# Patient Record
Sex: FEMALE | Race: BLACK OR AFRICAN AMERICAN | NOT HISPANIC OR LATINO
[De-identification: names, ages, dates, MRNs, and addresses within clinical notes are randomized per-mention and may not be internally consistent; named-entity substitution may affect disease eponyms.]

---

## 2017-01-17 ENCOUNTER — APPOINTMENT (OUTPATIENT)
Dept: VASCULAR SURGERY | Facility: CLINIC | Age: 62
End: 2017-01-17

## 2017-02-21 ENCOUNTER — APPOINTMENT (OUTPATIENT)
Dept: VASCULAR SURGERY | Facility: CLINIC | Age: 62
End: 2017-02-21

## 2017-04-29 ENCOUNTER — OUTPATIENT (OUTPATIENT)
Dept: OUTPATIENT SERVICES | Facility: HOSPITAL | Age: 62
LOS: 1 days | End: 2017-04-29
Payer: COMMERCIAL

## 2017-04-29 ENCOUNTER — APPOINTMENT (OUTPATIENT)
Dept: RADIOLOGY | Facility: HOSPITAL | Age: 62
End: 2017-04-29

## 2017-04-29 PROCEDURE — 77080 DXA BONE DENSITY AXIAL: CPT

## 2017-06-27 ENCOUNTER — APPOINTMENT (OUTPATIENT)
Dept: VASCULAR SURGERY | Facility: CLINIC | Age: 62
End: 2017-06-27

## 2017-06-27 VITALS
HEART RATE: 82 BPM | SYSTOLIC BLOOD PRESSURE: 144 MMHG | BODY MASS INDEX: 45.99 KG/M2 | HEIGHT: 67 IN | WEIGHT: 293 LBS | DIASTOLIC BLOOD PRESSURE: 83 MMHG | TEMPERATURE: 98.1 F

## 2017-06-27 DIAGNOSIS — L85.3 XEROSIS CUTIS: ICD-10-CM

## 2017-08-01 ENCOUNTER — APPOINTMENT (OUTPATIENT)
Dept: VASCULAR SURGERY | Facility: CLINIC | Age: 62
End: 2017-08-01
Payer: COMMERCIAL

## 2017-08-01 VITALS
TEMPERATURE: 98.3 F | HEART RATE: 64 BPM | WEIGHT: 293 LBS | BODY MASS INDEX: 45.99 KG/M2 | SYSTOLIC BLOOD PRESSURE: 153 MMHG | DIASTOLIC BLOOD PRESSURE: 83 MMHG | HEIGHT: 67 IN

## 2017-08-01 PROCEDURE — 99213 OFFICE O/P EST LOW 20 MIN: CPT

## 2017-09-07 ENCOUNTER — OUTPATIENT (OUTPATIENT)
Dept: OUTPATIENT SERVICES | Facility: HOSPITAL | Age: 62
LOS: 1 days | End: 2017-09-07
Payer: COMMERCIAL

## 2017-09-07 ENCOUNTER — APPOINTMENT (OUTPATIENT)
Dept: ULTRASOUND IMAGING | Facility: CLINIC | Age: 62
End: 2017-09-07
Payer: COMMERCIAL

## 2017-09-07 DIAGNOSIS — Z00.8 ENCOUNTER FOR OTHER GENERAL EXAMINATION: ICD-10-CM

## 2017-09-07 PROCEDURE — 76536 US EXAM OF HEAD AND NECK: CPT | Mod: 26

## 2017-09-07 PROCEDURE — 76536 US EXAM OF HEAD AND NECK: CPT

## 2017-09-12 ENCOUNTER — APPOINTMENT (OUTPATIENT)
Dept: VASCULAR SURGERY | Facility: CLINIC | Age: 62
End: 2017-09-12
Payer: COMMERCIAL

## 2017-09-12 VITALS
HEIGHT: 67 IN | HEART RATE: 71 BPM | WEIGHT: 292 LBS | TEMPERATURE: 98.2 F | BODY MASS INDEX: 45.83 KG/M2 | SYSTOLIC BLOOD PRESSURE: 152 MMHG | DIASTOLIC BLOOD PRESSURE: 85 MMHG

## 2017-09-12 PROCEDURE — 99213 OFFICE O/P EST LOW 20 MIN: CPT

## 2017-10-31 ENCOUNTER — APPOINTMENT (OUTPATIENT)
Dept: VASCULAR SURGERY | Facility: CLINIC | Age: 62
End: 2017-10-31
Payer: COMMERCIAL

## 2017-10-31 VITALS
DIASTOLIC BLOOD PRESSURE: 77 MMHG | HEIGHT: 67 IN | HEART RATE: 61 BPM | SYSTOLIC BLOOD PRESSURE: 168 MMHG | TEMPERATURE: 98 F

## 2017-10-31 PROCEDURE — 99213 OFFICE O/P EST LOW 20 MIN: CPT

## 2018-01-17 ENCOUNTER — APPOINTMENT (OUTPATIENT)
Dept: VASCULAR SURGERY | Facility: CLINIC | Age: 63
End: 2018-01-17

## 2018-01-30 ENCOUNTER — RESULT REVIEW (OUTPATIENT)
Age: 63
End: 2018-01-30

## 2018-01-30 ENCOUNTER — APPOINTMENT (OUTPATIENT)
Dept: VASCULAR SURGERY | Facility: CLINIC | Age: 63
End: 2018-01-30
Payer: COMMERCIAL

## 2018-01-30 VITALS
WEIGHT: 293 LBS | DIASTOLIC BLOOD PRESSURE: 77 MMHG | BODY MASS INDEX: 45.99 KG/M2 | SYSTOLIC BLOOD PRESSURE: 152 MMHG | TEMPERATURE: 98.2 F | HEART RATE: 74 BPM | HEIGHT: 67 IN

## 2018-01-30 PROCEDURE — 99213 OFFICE O/P EST LOW 20 MIN: CPT

## 2018-01-30 PROCEDURE — 10022: CPT

## 2018-01-30 PROCEDURE — 76942 ECHO GUIDE FOR BIOPSY: CPT | Mod: 26

## 2018-05-02 ENCOUNTER — APPOINTMENT (OUTPATIENT)
Dept: VASCULAR SURGERY | Facility: CLINIC | Age: 63
End: 2018-05-02

## 2018-06-22 ENCOUNTER — APPOINTMENT (OUTPATIENT)
Dept: MAMMOGRAPHY | Facility: CLINIC | Age: 63
End: 2018-06-22

## 2018-06-22 ENCOUNTER — APPOINTMENT (OUTPATIENT)
Dept: ULTRASOUND IMAGING | Facility: CLINIC | Age: 63
End: 2018-06-22

## 2018-07-20 ENCOUNTER — APPOINTMENT (OUTPATIENT)
Dept: NEUROLOGY | Facility: CLINIC | Age: 63
End: 2018-07-20
Payer: COMMERCIAL

## 2018-07-20 VITALS
HEIGHT: 67 IN | SYSTOLIC BLOOD PRESSURE: 138 MMHG | DIASTOLIC BLOOD PRESSURE: 77 MMHG | HEART RATE: 67 BPM | WEIGHT: 293 LBS | BODY MASS INDEX: 45.99 KG/M2

## 2018-07-20 DIAGNOSIS — Z82.0 FAMILY HISTORY OF EPILEPSY AND OTHER DISEASES OF THE NERVOUS SYSTEM: ICD-10-CM

## 2018-07-20 DIAGNOSIS — Z83.511 FAMILY HISTORY OF GLAUCOMA: ICD-10-CM

## 2018-07-20 DIAGNOSIS — Z83.3 FAMILY HISTORY OF DIABETES MELLITUS: ICD-10-CM

## 2018-07-20 DIAGNOSIS — M25.541 PAIN IN JOINTS OF RIGHT HAND: ICD-10-CM

## 2018-07-20 PROCEDURE — 99204 OFFICE O/P NEW MOD 45 MIN: CPT

## 2018-11-12 ENCOUNTER — APPOINTMENT (OUTPATIENT)
Dept: VASCULAR SURGERY | Facility: CLINIC | Age: 63
End: 2018-11-12
Payer: COMMERCIAL

## 2018-11-12 VITALS
HEART RATE: 74 BPM | TEMPERATURE: 98.4 F | HEIGHT: 67 IN | WEIGHT: 293 LBS | SYSTOLIC BLOOD PRESSURE: 130 MMHG | DIASTOLIC BLOOD PRESSURE: 83 MMHG | BODY MASS INDEX: 45.99 KG/M2

## 2018-11-12 PROCEDURE — 99213 OFFICE O/P EST LOW 20 MIN: CPT

## 2018-12-22 ENCOUNTER — APPOINTMENT (OUTPATIENT)
Dept: ULTRASOUND IMAGING | Facility: IMAGING CENTER | Age: 63
End: 2018-12-22

## 2018-12-22 ENCOUNTER — APPOINTMENT (OUTPATIENT)
Dept: MAMMOGRAPHY | Facility: IMAGING CENTER | Age: 63
End: 2018-12-22

## 2019-02-05 ENCOUNTER — APPOINTMENT (OUTPATIENT)
Dept: VASCULAR SURGERY | Facility: CLINIC | Age: 64
End: 2019-02-05

## 2019-04-17 ENCOUNTER — APPOINTMENT (OUTPATIENT)
Dept: VASCULAR SURGERY | Facility: CLINIC | Age: 64
End: 2019-04-17

## 2019-06-01 ENCOUNTER — APPOINTMENT (OUTPATIENT)
Dept: ULTRASOUND IMAGING | Facility: IMAGING CENTER | Age: 64
End: 2019-06-01

## 2019-06-01 ENCOUNTER — APPOINTMENT (OUTPATIENT)
Dept: MAMMOGRAPHY | Facility: IMAGING CENTER | Age: 64
End: 2019-06-01

## 2019-08-05 ENCOUNTER — APPOINTMENT (OUTPATIENT)
Dept: SURGERY | Facility: CLINIC | Age: 64
End: 2019-08-05

## 2019-08-10 ENCOUNTER — APPOINTMENT (OUTPATIENT)
Dept: MAMMOGRAPHY | Facility: IMAGING CENTER | Age: 64
End: 2019-08-10
Payer: COMMERCIAL

## 2019-08-10 ENCOUNTER — OUTPATIENT (OUTPATIENT)
Dept: OUTPATIENT SERVICES | Facility: HOSPITAL | Age: 64
LOS: 1 days | End: 2019-08-10
Payer: COMMERCIAL

## 2019-08-10 ENCOUNTER — APPOINTMENT (OUTPATIENT)
Dept: ULTRASOUND IMAGING | Facility: IMAGING CENTER | Age: 64
End: 2019-08-10
Payer: COMMERCIAL

## 2019-08-10 DIAGNOSIS — Z00.8 ENCOUNTER FOR OTHER GENERAL EXAMINATION: ICD-10-CM

## 2019-08-10 PROCEDURE — 77063 BREAST TOMOSYNTHESIS BI: CPT

## 2019-08-10 PROCEDURE — 76641 ULTRASOUND BREAST COMPLETE: CPT

## 2019-08-10 PROCEDURE — 77067 SCR MAMMO BI INCL CAD: CPT | Mod: 26

## 2019-08-10 PROCEDURE — 76641 ULTRASOUND BREAST COMPLETE: CPT | Mod: 26,50

## 2019-08-10 PROCEDURE — 77063 BREAST TOMOSYNTHESIS BI: CPT | Mod: 26

## 2019-08-10 PROCEDURE — 77067 SCR MAMMO BI INCL CAD: CPT

## 2019-10-14 ENCOUNTER — APPOINTMENT (OUTPATIENT)
Dept: RADIOLOGY | Facility: IMAGING CENTER | Age: 64
End: 2019-10-14

## 2019-11-11 ENCOUNTER — APPOINTMENT (OUTPATIENT)
Dept: VASCULAR SURGERY | Facility: CLINIC | Age: 64
End: 2019-11-11
Payer: COMMERCIAL

## 2019-11-11 VITALS
HEART RATE: 101 BPM | DIASTOLIC BLOOD PRESSURE: 82 MMHG | WEIGHT: 293 LBS | TEMPERATURE: 98.9 F | BODY MASS INDEX: 43.4 KG/M2 | SYSTOLIC BLOOD PRESSURE: 176 MMHG | HEIGHT: 69 IN

## 2019-11-11 PROCEDURE — 99213 OFFICE O/P EST LOW 20 MIN: CPT

## 2019-11-29 ENCOUNTER — APPOINTMENT (OUTPATIENT)
Dept: RADIOLOGY | Facility: IMAGING CENTER | Age: 64
End: 2019-11-29

## 2019-12-11 ENCOUNTER — APPOINTMENT (OUTPATIENT)
Dept: RADIOLOGY | Facility: IMAGING CENTER | Age: 64
End: 2019-12-11

## 2019-12-11 ENCOUNTER — OUTPATIENT (OUTPATIENT)
Dept: OUTPATIENT SERVICES | Facility: HOSPITAL | Age: 64
LOS: 1 days | End: 2019-12-11
Payer: COMMERCIAL

## 2019-12-11 DIAGNOSIS — Z00.8 ENCOUNTER FOR OTHER GENERAL EXAMINATION: ICD-10-CM

## 2019-12-11 PROCEDURE — 77080 DXA BONE DENSITY AXIAL: CPT

## 2019-12-11 PROCEDURE — 77080 DXA BONE DENSITY AXIAL: CPT | Mod: 26

## 2020-01-13 ENCOUNTER — APPOINTMENT (OUTPATIENT)
Dept: VASCULAR SURGERY | Facility: CLINIC | Age: 65
End: 2020-01-13

## 2020-03-20 ENCOUNTER — INPATIENT (INPATIENT)
Facility: HOSPITAL | Age: 65
LOS: 8 days | Discharge: HOME | End: 2020-03-29
Attending: HOSPITALIST | Admitting: HOSPITALIST
Payer: MEDICARE

## 2020-03-20 VITALS
TEMPERATURE: 103 F | RESPIRATION RATE: 16 BRPM | DIASTOLIC BLOOD PRESSURE: 93 MMHG | HEART RATE: 118 BPM | SYSTOLIC BLOOD PRESSURE: 168 MMHG | OXYGEN SATURATION: 100 %

## 2020-03-20 DIAGNOSIS — Z98.891 HISTORY OF UTERINE SCAR FROM PREVIOUS SURGERY: Chronic | ICD-10-CM

## 2020-03-20 DIAGNOSIS — Z02.9 ENCOUNTER FOR ADMINISTRATIVE EXAMINATIONS, UNSPECIFIED: ICD-10-CM

## 2020-03-20 DIAGNOSIS — Z87.828 PERSONAL HISTORY OF OTHER (HEALED) PHYSICAL INJURY AND TRAUMA: Chronic | ICD-10-CM

## 2020-03-20 DIAGNOSIS — E11.9 TYPE 2 DIABETES MELLITUS WITHOUT COMPLICATIONS: ICD-10-CM

## 2020-03-20 DIAGNOSIS — I10 ESSENTIAL (PRIMARY) HYPERTENSION: ICD-10-CM

## 2020-03-20 DIAGNOSIS — Z29.9 ENCOUNTER FOR PROPHYLACTIC MEASURES, UNSPECIFIED: ICD-10-CM

## 2020-03-20 DIAGNOSIS — Z79.899 OTHER LONG TERM (CURRENT) DRUG THERAPY: ICD-10-CM

## 2020-03-20 DIAGNOSIS — R50.9 FEVER, UNSPECIFIED: ICD-10-CM

## 2020-03-20 DIAGNOSIS — Z98.890 OTHER SPECIFIED POSTPROCEDURAL STATES: Chronic | ICD-10-CM

## 2020-03-20 DIAGNOSIS — R06.02 SHORTNESS OF BREATH: ICD-10-CM

## 2020-03-20 DIAGNOSIS — B34.9 VIRAL INFECTION, UNSPECIFIED: ICD-10-CM

## 2020-03-20 DIAGNOSIS — K46.9 UNSPECIFIED ABDOMINAL HERNIA WITHOUT OBSTRUCTION OR GANGRENE: Chronic | ICD-10-CM

## 2020-03-20 LAB
ALBUMIN SERPL ELPH-MCNC: 3.9 G/DL — SIGNIFICANT CHANGE UP (ref 3.3–5)
ALP SERPL-CCNC: 94 U/L — SIGNIFICANT CHANGE UP (ref 40–120)
ALT FLD-CCNC: 25 U/L — SIGNIFICANT CHANGE UP (ref 4–33)
ANION GAP SERPL CALC-SCNC: 15 MMO/L — HIGH (ref 7–14)
APPEARANCE UR: CLEAR — SIGNIFICANT CHANGE UP
APTT BLD: 33.6 SEC — SIGNIFICANT CHANGE UP (ref 27.5–36.3)
AST SERPL-CCNC: 46 U/L — HIGH (ref 4–32)
B PERT DNA SPEC QL NAA+PROBE: NOT DETECTED — SIGNIFICANT CHANGE UP
BACTERIA # UR AUTO: SIGNIFICANT CHANGE UP
BASE EXCESS BLDV CALC-SCNC: 4.2 MMOL/L — SIGNIFICANT CHANGE UP
BASE EXCESS BLDV CALC-SCNC: 6.4 MMOL/L — SIGNIFICANT CHANGE UP
BASOPHILS # BLD AUTO: 0.01 K/UL — SIGNIFICANT CHANGE UP (ref 0–0.2)
BASOPHILS NFR BLD AUTO: 0.2 % — SIGNIFICANT CHANGE UP (ref 0–2)
BILIRUB SERPL-MCNC: 0.4 MG/DL — SIGNIFICANT CHANGE UP (ref 0.2–1.2)
BILIRUB UR-MCNC: NEGATIVE — SIGNIFICANT CHANGE UP
BLOOD GAS VENOUS - CREATININE: 0.61 MG/DL — SIGNIFICANT CHANGE UP (ref 0.5–1.3)
BLOOD GAS VENOUS - CREATININE: 0.63 MG/DL — SIGNIFICANT CHANGE UP (ref 0.5–1.3)
BLOOD UR QL VISUAL: SIGNIFICANT CHANGE UP
BUN SERPL-MCNC: 8 MG/DL — SIGNIFICANT CHANGE UP (ref 7–23)
C PNEUM DNA SPEC QL NAA+PROBE: NOT DETECTED — SIGNIFICANT CHANGE UP
CALCIUM SERPL-MCNC: 9 MG/DL — SIGNIFICANT CHANGE UP (ref 8.4–10.5)
CHLORIDE BLDV-SCNC: 100 MMOL/L — SIGNIFICANT CHANGE UP (ref 96–108)
CHLORIDE BLDV-SCNC: 98 MMOL/L — SIGNIFICANT CHANGE UP (ref 96–108)
CHLORIDE SERPL-SCNC: 95 MMOL/L — LOW (ref 98–107)
CO2 SERPL-SCNC: 26 MMOL/L — SIGNIFICANT CHANGE UP (ref 22–31)
COLOR SPEC: YELLOW — SIGNIFICANT CHANGE UP
CREAT SERPL-MCNC: 0.61 MG/DL — SIGNIFICANT CHANGE UP (ref 0.5–1.3)
EOSINOPHIL # BLD AUTO: 0 K/UL — SIGNIFICANT CHANGE UP (ref 0–0.5)
EOSINOPHIL NFR BLD AUTO: 0 % — SIGNIFICANT CHANGE UP (ref 0–6)
FLUAV H1 2009 PAND RNA SPEC QL NAA+PROBE: NOT DETECTED — SIGNIFICANT CHANGE UP
FLUAV H1 RNA SPEC QL NAA+PROBE: NOT DETECTED — SIGNIFICANT CHANGE UP
FLUAV H3 RNA SPEC QL NAA+PROBE: NOT DETECTED — SIGNIFICANT CHANGE UP
FLUAV SUBTYP SPEC NAA+PROBE: NOT DETECTED — SIGNIFICANT CHANGE UP
FLUBV RNA SPEC QL NAA+PROBE: NOT DETECTED — SIGNIFICANT CHANGE UP
GAS PNL BLDV: 135 MMOL/L — LOW (ref 136–146)
GAS PNL BLDV: 139 MMOL/L — SIGNIFICANT CHANGE UP (ref 136–146)
GLUCOSE BLDV-MCNC: 163 MG/DL — HIGH (ref 70–99)
GLUCOSE BLDV-MCNC: 175 MG/DL — HIGH (ref 70–99)
GLUCOSE SERPL-MCNC: 170 MG/DL — HIGH (ref 70–99)
GLUCOSE UR-MCNC: NEGATIVE — SIGNIFICANT CHANGE UP
HADV DNA SPEC QL NAA+PROBE: NOT DETECTED — SIGNIFICANT CHANGE UP
HCO3 BLDV-SCNC: 27 MMOL/L — SIGNIFICANT CHANGE UP (ref 20–27)
HCO3 BLDV-SCNC: 29 MMOL/L — HIGH (ref 20–27)
HCOV PNL SPEC NAA+PROBE: SIGNIFICANT CHANGE UP
HCT VFR BLD CALC: 39.4 % — SIGNIFICANT CHANGE UP (ref 34.5–45)
HCT VFR BLDV CALC: 37.1 % — SIGNIFICANT CHANGE UP (ref 34.5–45)
HCT VFR BLDV CALC: 39.6 % — SIGNIFICANT CHANGE UP (ref 34.5–45)
HGB BLD-MCNC: 12.9 G/DL — SIGNIFICANT CHANGE UP (ref 11.5–15.5)
HGB BLDV-MCNC: 12.1 G/DL — SIGNIFICANT CHANGE UP (ref 11.5–15.5)
HGB BLDV-MCNC: 12.9 G/DL — SIGNIFICANT CHANGE UP (ref 11.5–15.5)
HMPV RNA SPEC QL NAA+PROBE: NOT DETECTED — SIGNIFICANT CHANGE UP
HPIV1 RNA SPEC QL NAA+PROBE: NOT DETECTED — SIGNIFICANT CHANGE UP
HPIV2 RNA SPEC QL NAA+PROBE: NOT DETECTED — SIGNIFICANT CHANGE UP
HPIV3 RNA SPEC QL NAA+PROBE: NOT DETECTED — SIGNIFICANT CHANGE UP
HPIV4 RNA SPEC QL NAA+PROBE: NOT DETECTED — SIGNIFICANT CHANGE UP
HYALINE CASTS # UR AUTO: NEGATIVE — SIGNIFICANT CHANGE UP
IMM GRANULOCYTES NFR BLD AUTO: 0.4 % — SIGNIFICANT CHANGE UP (ref 0–1.5)
INR BLD: 1.15 — SIGNIFICANT CHANGE UP (ref 0.88–1.17)
KETONES UR-MCNC: SIGNIFICANT CHANGE UP
LACTATE BLDV-MCNC: 1.3 MMOL/L — SIGNIFICANT CHANGE UP (ref 0.5–2)
LACTATE BLDV-MCNC: 2.1 MMOL/L — HIGH (ref 0.5–2)
LEUKOCYTE ESTERASE UR-ACNC: NEGATIVE — SIGNIFICANT CHANGE UP
LYMPHOCYTES # BLD AUTO: 1.19 K/UL — SIGNIFICANT CHANGE UP (ref 1–3.3)
LYMPHOCYTES # BLD AUTO: 25.8 % — SIGNIFICANT CHANGE UP (ref 13–44)
MCHC RBC-ENTMCNC: 28 PG — SIGNIFICANT CHANGE UP (ref 27–34)
MCHC RBC-ENTMCNC: 32.7 % — SIGNIFICANT CHANGE UP (ref 32–36)
MCV RBC AUTO: 85.5 FL — SIGNIFICANT CHANGE UP (ref 80–100)
MONOCYTES # BLD AUTO: 0.26 K/UL — SIGNIFICANT CHANGE UP (ref 0–0.9)
MONOCYTES NFR BLD AUTO: 5.6 % — SIGNIFICANT CHANGE UP (ref 2–14)
NEUTROPHILS # BLD AUTO: 3.13 K/UL — SIGNIFICANT CHANGE UP (ref 1.8–7.4)
NEUTROPHILS NFR BLD AUTO: 68 % — SIGNIFICANT CHANGE UP (ref 43–77)
NITRITE UR-MCNC: NEGATIVE — SIGNIFICANT CHANGE UP
NRBC # FLD: 0 K/UL — SIGNIFICANT CHANGE UP (ref 0–0)
NT-PROBNP SERPL-SCNC: 94.52 PG/ML — SIGNIFICANT CHANGE UP
PCO2 BLDV: 45 MMHG — SIGNIFICANT CHANGE UP (ref 41–51)
PCO2 BLDV: 46 MMHG — SIGNIFICANT CHANGE UP (ref 41–51)
PH BLDV: 7.41 PH — SIGNIFICANT CHANGE UP (ref 7.32–7.43)
PH BLDV: 7.45 PH — HIGH (ref 7.32–7.43)
PH UR: 6.5 — SIGNIFICANT CHANGE UP (ref 5–8)
PLATELET # BLD AUTO: 154 K/UL — SIGNIFICANT CHANGE UP (ref 150–400)
PMV BLD: 10.6 FL — SIGNIFICANT CHANGE UP (ref 7–13)
PO2 BLDV: 27 MMHG — LOW (ref 35–40)
PO2 BLDV: 38 MMHG — SIGNIFICANT CHANGE UP (ref 35–40)
POTASSIUM BLDV-SCNC: 3.4 MMOL/L — SIGNIFICANT CHANGE UP (ref 3.4–4.5)
POTASSIUM BLDV-SCNC: 3.7 MMOL/L — SIGNIFICANT CHANGE UP (ref 3.4–4.5)
POTASSIUM SERPL-MCNC: 3.3 MMOL/L — LOW (ref 3.5–5.3)
POTASSIUM SERPL-SCNC: 3.3 MMOL/L — LOW (ref 3.5–5.3)
PROT SERPL-MCNC: 7.6 G/DL — SIGNIFICANT CHANGE UP (ref 6–8.3)
PROT UR-MCNC: 50 — SIGNIFICANT CHANGE UP
PROTHROM AB SERPL-ACNC: 13.2 SEC — HIGH (ref 9.8–13.1)
RBC # BLD: 4.61 M/UL — SIGNIFICANT CHANGE UP (ref 3.8–5.2)
RBC # FLD: 14.1 % — SIGNIFICANT CHANGE UP (ref 10.3–14.5)
RBC CASTS # UR COMP ASSIST: SIGNIFICANT CHANGE UP (ref 0–?)
RSV RNA SPEC QL NAA+PROBE: NOT DETECTED — SIGNIFICANT CHANGE UP
RV+EV RNA SPEC QL NAA+PROBE: NOT DETECTED — SIGNIFICANT CHANGE UP
SAO2 % BLDV: 46.6 % — LOW (ref 60–85)
SAO2 % BLDV: 67.3 % — SIGNIFICANT CHANGE UP (ref 60–85)
SODIUM SERPL-SCNC: 136 MMOL/L — SIGNIFICANT CHANGE UP (ref 135–145)
SP GR SPEC: 1.01 — SIGNIFICANT CHANGE UP (ref 1–1.04)
SQUAMOUS # UR AUTO: SIGNIFICANT CHANGE UP
TROPONIN T, HIGH SENSITIVITY: 11 NG/L — SIGNIFICANT CHANGE UP (ref ?–14)
TROPONIN T, HIGH SENSITIVITY: 14 NG/L — SIGNIFICANT CHANGE UP (ref ?–14)
UROBILINOGEN FLD QL: NORMAL — SIGNIFICANT CHANGE UP
WBC # BLD: 4.61 K/UL — SIGNIFICANT CHANGE UP (ref 3.8–10.5)
WBC # FLD AUTO: 4.61 K/UL — SIGNIFICANT CHANGE UP (ref 3.8–10.5)
WBC UR QL: SIGNIFICANT CHANGE UP (ref 0–?)

## 2020-03-20 PROCEDURE — 99223 1ST HOSP IP/OBS HIGH 75: CPT

## 2020-03-20 PROCEDURE — 71045 X-RAY EXAM CHEST 1 VIEW: CPT | Mod: 26

## 2020-03-20 RX ORDER — GLUCAGON INJECTION, SOLUTION 0.5 MG/.1ML
1 INJECTION, SOLUTION SUBCUTANEOUS ONCE
Refills: 0 | Status: DISCONTINUED | OUTPATIENT
Start: 2020-03-20 | End: 2020-03-29

## 2020-03-20 RX ORDER — POTASSIUM CHLORIDE 20 MEQ
40 PACKET (EA) ORAL ONCE
Refills: 0 | Status: COMPLETED | OUTPATIENT
Start: 2020-03-20 | End: 2020-03-20

## 2020-03-20 RX ORDER — DEXTROSE 50 % IN WATER 50 %
12.5 SYRINGE (ML) INTRAVENOUS ONCE
Refills: 0 | Status: DISCONTINUED | OUTPATIENT
Start: 2020-03-20 | End: 2020-03-29

## 2020-03-20 RX ORDER — SODIUM CHLORIDE 9 MG/ML
1000 INJECTION, SOLUTION INTRAVENOUS
Refills: 0 | Status: DISCONTINUED | OUTPATIENT
Start: 2020-03-20 | End: 2020-03-29

## 2020-03-20 RX ORDER — INSULIN LISPRO 100/ML
VIAL (ML) SUBCUTANEOUS
Refills: 0 | Status: DISCONTINUED | OUTPATIENT
Start: 2020-03-20 | End: 2020-03-29

## 2020-03-20 RX ORDER — CEFTRIAXONE 500 MG/1
1000 INJECTION, POWDER, FOR SOLUTION INTRAMUSCULAR; INTRAVENOUS ONCE
Refills: 0 | Status: COMPLETED | OUTPATIENT
Start: 2020-03-20 | End: 2020-03-20

## 2020-03-20 RX ORDER — IBUPROFEN 200 MG
200 TABLET ORAL ONCE
Refills: 0 | Status: DISCONTINUED | OUTPATIENT
Start: 2020-03-20 | End: 2020-03-20

## 2020-03-20 RX ORDER — ACETAMINOPHEN 500 MG
650 TABLET ORAL ONCE
Refills: 0 | Status: COMPLETED | OUTPATIENT
Start: 2020-03-20 | End: 2020-03-20

## 2020-03-20 RX ORDER — DEXTROSE 50 % IN WATER 50 %
15 SYRINGE (ML) INTRAVENOUS ONCE
Refills: 0 | Status: DISCONTINUED | OUTPATIENT
Start: 2020-03-20 | End: 2020-03-29

## 2020-03-20 RX ORDER — SODIUM CHLORIDE 9 MG/ML
2000 INJECTION, SOLUTION INTRAVENOUS ONCE
Refills: 0 | Status: COMPLETED | OUTPATIENT
Start: 2020-03-20 | End: 2020-03-20

## 2020-03-20 RX ORDER — DEXTROSE 50 % IN WATER 50 %
25 SYRINGE (ML) INTRAVENOUS ONCE
Refills: 0 | Status: DISCONTINUED | OUTPATIENT
Start: 2020-03-20 | End: 2020-03-29

## 2020-03-20 RX ORDER — IBUPROFEN 200 MG
400 TABLET ORAL ONCE
Refills: 0 | Status: COMPLETED | OUTPATIENT
Start: 2020-03-20 | End: 2020-03-20

## 2020-03-20 RX ORDER — HEPARIN SODIUM 5000 [USP'U]/ML
5000 INJECTION INTRAVENOUS; SUBCUTANEOUS EVERY 8 HOURS
Refills: 0 | Status: DISCONTINUED | OUTPATIENT
Start: 2020-03-20 | End: 2020-03-29

## 2020-03-20 RX ORDER — ACETAMINOPHEN 500 MG
650 TABLET ORAL EVERY 6 HOURS
Refills: 0 | Status: DISCONTINUED | OUTPATIENT
Start: 2020-03-20 | End: 2020-03-29

## 2020-03-20 RX ADMIN — Medication 400 MILLIGRAM(S): at 15:52

## 2020-03-20 RX ADMIN — CEFTRIAXONE 1000 MILLIGRAM(S): 500 INJECTION, POWDER, FOR SOLUTION INTRAMUSCULAR; INTRAVENOUS at 09:17

## 2020-03-20 RX ADMIN — SODIUM CHLORIDE 2000 MILLILITER(S): 9 INJECTION, SOLUTION INTRAVENOUS at 07:48

## 2020-03-20 RX ADMIN — Medication 650 MILLIGRAM(S): at 13:15

## 2020-03-20 RX ADMIN — SODIUM CHLORIDE 2000 MILLILITER(S): 9 INJECTION, SOLUTION INTRAVENOUS at 13:15

## 2020-03-20 RX ADMIN — Medication 650 MILLIGRAM(S): at 07:48

## 2020-03-20 RX ADMIN — Medication 650 MILLIGRAM(S): at 09:17

## 2020-03-20 RX ADMIN — Medication 40 MILLIEQUIVALENT(S): at 15:52

## 2020-03-20 RX ADMIN — CEFTRIAXONE 100 MILLIGRAM(S): 500 INJECTION, POWDER, FOR SOLUTION INTRAMUSCULAR; INTRAVENOUS at 07:48

## 2020-03-20 RX ADMIN — HEPARIN SODIUM 5000 UNIT(S): 5000 INJECTION INTRAVENOUS; SUBCUTANEOUS at 22:11

## 2020-03-20 RX ADMIN — Medication 650 MILLIGRAM(S): at 14:49

## 2020-03-20 RX ADMIN — Medication 1: at 15:40

## 2020-03-20 NOTE — H&P ADULT - PROBLEM SELECTOR PLAN 4
- DVT ppx: Heparin SQ   - Airborne / Contact Isolation.   - Diet as tolerated. - [facility] - per pharm- no refills since 2014.  - Verbally: Losartan, Metformin, ASA (two tab daily).   - Requested Pharm Med Rec for assistance in Med rec.

## 2020-03-20 NOTE — H&P ADULT - PROBLEM SELECTOR PLAN 1
- Associated with Fever, "Chest tightness."  - No leukocytosis, lymphopenia, leukopenia.   - Chest X-ray negative, negative rapid viral panel.   - Follow up COVID 19- collected, in lab.   - Follow up blood cultures, urine cultures.   - Oxygen supplementation via NC as needed, monitor respiratory status.   - If has increasing O2 requirements, use Nonrebreather, monitor closely.   - Avoid BIPAP, High Flow, or Duonebulizers given risk of aerosolization. - Associated with Fever, "Chest tightness."  - No leukocytosis, lymphopenia, leukopenia.   - Chest X-ray negative, negative rapid viral panel.   - Follow up COVID 19- collected, in lab.   - Follow up blood cultures, urine cultures.   - Given age >65, comorbidities- to d/w w/ ID starting ?Plaquenil, Vit C, Thiamine.  - Oxygen supplementation via NC as needed, monitor respiratory status.   - If has increasing O2 requirements, use Nonrebreather, monitor closely.   - Avoid BIPAP, High Flow, or Duonebulizers given risk of aerosolization. - Associated with Fever, "Chest tightness."  - RULE OUT SARS associated coronavirus infection.  - No leukocytosis, lymphopenia, leukopenia. Troponin negative x2.   - Chest X-ray negative, negative rapid viral panel.   - Follow up COVID 19- collected, in lab.   - Follow up blood cultures, urine cultures.   - Given age >65, comorbidities- to d/w w/ ID starting ?Plaquenil, Vit C, Thiamine.  - Oxygen supplementation via NC as needed, monitor respiratory status.   - If has increasing O2 requirements, use Nonrebreather, monitor closely.   - Avoid BIPAP, High Flow, or Duonebulizers given risk of aerosolization. - Associated with Fever, "Chest tightness."  - RULE OUT SARS associated coronavirus infection.  - No leukocytosis, lymphopenia, leukopenia. Troponin negative x2.   - Chest X-ray negative, negative rapid viral panel.   - Follow up COVID 19- collected, in lab.   - Follow up blood cultures, urine cultures.   - Given age >65, comorbidities- to d/w w/ ID starting ?Plaquenil, Vit C, Thiamine.  - Monitor vitals closely. Fever curve.   - Oxygen supplementation via NC as needed, monitor respiratory status.   - If has increasing O2 requirements, use Nonrebreather, monitor closely.   - Avoid BIPAP, High Flow, or Duonebulizers given risk of aerosolization. - Associated with Fever, "Chest tightness." Diarrhea Sunday- >Monday.  - RULE OUT SARS associated coronavirus infection.  - No leukocytosis, lymphopenia, leukopenia. Troponin negative x2.   - Chest X-ray negative, negative rapid viral panel.   - Follow up COVID 19- collected, in lab.   - Follow up blood cultures, urine cultures.   - Given age >65, comorbidities- to d/w case with ID.   - Per ID, only start Plaquenil if patient develops hypoxia, worsening resp status.   - Monitor vitals closely. Fever curve.   - Oxygen supplementation via NC as needed, monitor respiratory status.   - If has increasing O2 requirements, use Nonrebreather, monitor closely.   - Avoid BIPAP, High Flow, or Duonebulizers given risk of aerosolization.  - Supportive measures: Tylenol PRN fever/pain, avoid NSAIDS.  - If continued diarrhea- can consider stool studies, further imaging.

## 2020-03-20 NOTE — ED ADULT NURSE REASSESSMENT NOTE - NS ED NURSE REASSESS COMMENT FT1
pt received at shift change. awake and alert. breathing even and unlabored. denies any complaints. awaits bed assignment in [facility].
Pt resting comfortably in bed, continues to be febrile at this time, ACP team aware at this time, awaiting orders, pt stable, in NAD, will continue to monitor.
Pt resting comfortably in bed, currently has temp of 101F, MAR provider aware, awaiting orders at this time, all other VSS, pt stable, in NAD, will continue to monitor.
Report received from night RN. Pt resting comfortably in bed, denies any pain/ symptoms at this time, VSS, pt stable, in NAD, urine specimen drawn and sent, IVF continue to be infusing well at this time. Will continue to monitor.

## 2020-03-20 NOTE — ED ADULT NURSE REASSESSMENT NOTE - GENERAL PATIENT STATE
resting/sleeping/smiling/interactive/improvement verbalized/comfortable appearance/cooperative
comfortable appearance/cooperative/improvement verbalized/family/SO at bedside/smiling/interactive/resting/sleeping
smiling/interactive/resting/sleeping/comfortable appearance/cooperative/improvement verbalized

## 2020-03-20 NOTE — ED PROVIDER NOTE - OBJECTIVE STATEMENT
66 yo f pmh htn, hld, dm, unrepaired abd hernia, pw four day hx of fever. pt reports she has had mid sternal cp, "tightness", intermittent, no known exacerbating/remitting sx. associated sob. reports myalgias and frontal ha. denies photophobia, light aversion, n/t.

## 2020-03-20 NOTE — ED PROVIDER NOTE - PHYSICAL EXAMINATION
General: nad  HEENT: EOMI, PERRLA, normal mucosa, normal oropharynx, no lesions on the lips or on oral mucosa, normal external ear  Neck: supple, no lymphadenopathy, full range of motion, no nuchal rigidity  CV: RRR, normal S1 and S2 with no murmur, capillary refill less than two seconds  Resp: lungs CTA b/l, good aeration bilaterally, symmetric chest wall   Abd: non-distended, soft, non-tender, no guarding/rebound  : no CVA tenderness  MSK: full range of motion, no cyanosis, no edema, no clubbing, no immobility  Neuro: CN II-XII grossly intact, muscle strength 5/5 in all extremities  Skin: no rashes, skin intact

## 2020-03-20 NOTE — ED PROVIDER NOTE - NS ED ROS FT
GENERAL: f/c  //             EYES: no change in vision, //             HEENT: no trouble swallowing or speaking, //             CARDIAC:  chest pain, //              PULMONARY: cough/sob //             GI: no abdominal pain, no nausea or no vomiting, no diarrhea or constipation, //             : No changes in urination,  //            SKIN: no rashes,  //            NEURO:  headache,  //             MSK: No joint pain otherwise as HPI or negative. ~Alonso Frey DO PGY2

## 2020-03-20 NOTE — ED ADULT NURSE REASSESSMENT NOTE - COMFORT CARE
repositioned/assisted to bedside commode/side rails up
assisted to bedside commode/repositioned/plan of care explained/warm blanket provided/side rails up
plan of care explained/warm blanket provided/side rails up/repositioned/wait time explained

## 2020-03-20 NOTE — H&P ADULT - ASSESSMENT
A 65 year old female patient with PMH of HTN, HLD, DM, unrepaired abdominal hernia, who presented with complaints of fever, shortness of breath, "chest tightness", and tachycardia admitted to rule out COVID 19.

## 2020-03-20 NOTE — H&P ADULT - HISTORY OF PRESENT ILLNESS
A 65 year old female patient with PMH of HTN, HLD, DM, unrepaired abdominal hernia, who presented with complaints of four day hx of fever. Patient reports she has had mid sternal cp, "tightness", intermittent, no known exacerbating/remitting sx. associated sob. reports myalgias and frontal ha. denies photophobia, light aversion, n/t A 65 year old female patient with PMH of HTN, HLD, DM, unrepaired abdominal hernia, who presented with complaints of four day history of fever, and chest tightness. Patient states that she developed diarrhea (nonbloody) on Sunday through Monday- followed by development of fever/chills since Tuesday that was associated mid sternal "chest tightnes/congestion" and generalized malaise- not associated with shortness of breath, night sweats, nausea, vomiting, or cough. No particular exacerbating or relieving factors for the chest tightness- which has now improved. Denies any abdominal pain, further diarrheal episodes, headaches, changes in visions, sick contacts, recent travel history.     In the ED:    Vital Signs:  · BP Systolic	168 mm Hg	  · BP Diastolic	93 mm Hg	  · Heart Rate	  118 /min	  · Respiration Rate (breaths/min)	16 /min	  · Temp (F)	  102.7 Degrees F	  · Temp (C)	  39.3 Degrees C	  · Temp site	oral	  · SpO2 (%)	100 %	  · O2 Delivery/Oxygen Delivery Method	room air	    On labs: no leukocytosis, lymphopenia, leukopenia, renal function wnl, liver enzymes wnl (AST 46), coags wnl, Troponin negative x2, glucose trend 160-170, Lactate wnl, PH 7.45, K 3.3 (repleted), otherwise electrolytes within normal limits, RVP negative, blood cultures pending, COVID pending, Chest xray clear.

## 2020-03-20 NOTE — H&P ADULT - NSHPPHYSICALEXAM_GEN_ALL_CORE
PHYSICAL EXAM:  Vital Signs Last 24 Hrs  T(C): 38.3 (20 Mar 2020 12:51), Max: 39.3 (20 Mar 2020 06:05)  T(F): 101 (20 Mar 2020 12:51), Max: 102.8 (20 Mar 2020 07:40)  HR: 99 (20 Mar 2020 12:51) (99 - 118)  BP: 133/60 (20 Mar 2020 12:51) (123/53 - 168/93)  BP(mean): --  RR: 20 (20 Mar 2020 12:51) (12 - 20)  SpO2: 99% (20 Mar 2020 12:51) (97% - 100%) I&O's Summary    PHYSICAL EXAMINATION:  General - NAD, well-groomed, appears comfortable, seen   HEENT – Normocephalic, atraumatic, PERRLA, EOM intact, moist oral mucosa, no tongue lesions.  No tonsillar erythema, exudates, or enlargement.    Neck – Supple, no noticeable or palpable swelling.    Cardiovascular – s1, s2+ regular rate and rhythm, no m/r/g, no JVD, no carotid bruits. No leg swelling.   Lungs - Clear to auscultation bilaterally, no use of accessory muscles, no wheezing rales or rhonchi.    Skin - No rashes, skin warm and dry, no erythematous areas.   Psychiatry – Calm and cooperative, alert and oriented x3.   Abdomen - Normal bowel sounds, abdomen soft, nontender, nondistended.   Extremities - No edema, cyanosis or clubbing. Symmetrical in size. 2+ peripheral pulses b/l.   Musculo Skeletal - 5/5 strength, normal range of motion, no swollen/erythematous jts.   Neurological – Alert and oriented x 3, CN 2-12 grossly intact. No focal findings, M/S intact b/l. Moves all extremities. PHYSICAL EXAM:  Vital Signs Last 24 Hrs  T(C): 38.3 (20 Mar 2020 12:51), Max: 39.3 (20 Mar 2020 06:05)  T(F): 101 (20 Mar 2020 12:51), Max: 102.8 (20 Mar 2020 07:40)  HR: 99 (20 Mar 2020 12:51) (99 - 118)  BP: 133/60 (20 Mar 2020 12:51) (123/53 - 168/93)  BP(mean): --  RR: 20 (20 Mar 2020 12:51) (12 - 20)  SpO2: 99% (20 Mar 2020 12:51) (97% - 100%) I&O's Summary    PHYSICAL EXAMINATION:  General - NAD, well-groomed, appears comfortable, lying in stretcher comfortably.   HEENT – Normocephalic, atraumatic, PERRLA, EOM intact, moist oral mucosa, no tongue lesions.  No tonsillar erythema, exudates, or enlargement.    Neck – Supple, no noticeable or palpable swelling.    Cardiovascular – s1, s2+ regular rate rhythm, no JVD, No leg swelling.   Lungs - Clear to auscultation b/l, no use of accessory muscles, no wheezing/rhonchi. No conversational dyspnea.   Skin - No rashes, skin warm and dry, no erythematous areas.   Psychiatry – Calm and cooperative, alert and oriented x3.   Abdomen - Normal bowel sounds, abdomen soft, nontender, nondistended.   Extremities - No edema, cyanosis or clubbing. Symmetrical in size. 2+ peripheral pulses b/l.   Musculo Skeletal - 5/5 strength, normal range of motion, no swollen/erythematous jts.   Neurological – Alert and oriented x 3, No focal findings, M/S intact b/l. Moves all extremities.

## 2020-03-20 NOTE — H&P ADULT - NSHPLABSRESULTS_GEN_ALL_CORE
LABS: reviewed                         12.9   4.61  )-----------( 154      ( 20 Mar 2020 07:42 )             39.4     03-20    136  |  95<L>  |  8   ----------------------------<  170<H>  3.3<L>   |  26  |  0.61    Ca    9.0      20 Mar 2020 07:42    TPro  7.6  /  Alb  3.9  /  TBili  0.4  /  DBili  x   /  AST  46<H>  /  ALT  25  /  AlkPhos  94  03-20    PT/INR - ( 20 Mar 2020 07:42 )   PT: 13.2 SEC;   INR: 1.15       PTT - ( 20 Mar 2020 07:42 )  PTT:33.6 SEC  Urinalysis Basic - ( 20 Mar 2020 09:03 )    Color: YELLOW / Appearance: CLEAR / S.011 / pH: 6.5  Gluc: NEGATIVE / Ketone: SMALL  / Bili: NEGATIVE / Urobili: NORMAL   Blood: SMALL / Protein: 50 / Nitrite: NEGATIVE   Leuk Esterase: NEGATIVE / RBC: 3-5 / WBC 0-2   Sq Epi: FEW / Non Sq Epi: x / Bacteria: FEW LABS: reviewed                         12.9   4.61  )-----------( 154      ( 20 Mar 2020 07:42 )             39.4     03-20    136  |  95<L>  |  8   ----------------------------<  170<H>  3.3<L>   |  26  |  0.61    Ca    9.0      20 Mar 2020 07:42    TPro  7.6  /  Alb  3.9  /  TBili  0.4  /  DBili  x   /  AST  46<H>  /  ALT  25  /  AlkPhos  94  03-20    PT/INR - ( 20 Mar 2020 07:42 )   PT: 13.2 SEC;   INR: 1.15       PTT - ( 20 Mar 2020 07:42 )  PTT:33.6 SEC  Urinalysis Basic - ( 20 Mar 2020 09:03 )    Color: YELLOW / Appearance: CLEAR / S.011 / pH: 6.5  Gluc: NEGATIVE / Ketone: SMALL  / Bili: NEGATIVE / Urobili: NORMAL   Blood: SMALL / Protein: 50 / Nitrite: NEGATIVE   Leuk Esterase: NEGATIVE / RBC: 3-5 / WBC 0-2   Sq Epi: FEW / Non Sq Epi: x / Bacteria: FEW    < from: Xray Chest 1 View AP/PA (20 @ 08:28) >  EXAM:  XR CHEST AP OR PA 1V    PROCEDURE DATE:  Mar 20 2020   INTERPRETATION:    DATE OF STUDY: 3/20/2020  PRIOR:None  CLINICAL INDICATION: Sepsis. Rule out pneumonia.  TECHNIQUE: portable chest.  FINDINGS:   The cardiomediastinalsilhouette is within normal limits.   Pulmonary vascularity is normal.   The lungs are clear. No pleural effusion or pneumothorax   There are degenerative changes of the thoracic spine   IMPRESSION:   Clear lungs.    < from: 12 Lead ECG (20 @ 06:18) >    Ventricular Rate 116 BPM    Atrial Rate 116 BPM    P-R Interval 168 ms    QRS Duration 94 ms    Q-T Interval 334 ms    QTC Calculation(Bezet) 464 ms    P Axis 54 degrees    R Axis -4 degrees    T Axis 63 degrees    Diagnosis Line Sinus tachycardia  Possible Left atrial enlargement  Septal infarct , age undetermined  Abnormal ECG

## 2020-03-20 NOTE — ED PROVIDER NOTE - PSH
Abdominal hernia    H/O  section    H/O eye surgery    History of meniscal tear  s/p surgery right knee  No significant past surgical history

## 2020-03-20 NOTE — H&P ADULT - PROBLEM SELECTOR PLAN 5
Transitions of Care Status:  1.  Name of PCP:   2.  PCP Contacted on Admission: [ ] Y    [ ] N    3.  PCP contacted at Discharge: [ ] Y    [ ] N    [ ] N/A  4.  Post-Discharge Appointment Date and Location:  5.  Summary of Handoff given to PCP: - DVT ppx: Heparin SQ   - Airborne / Contact Isolation.   - Diet as tolerated.

## 2020-03-20 NOTE — ED ADULT TRIAGE NOTE - CHIEF COMPLAINT QUOTE
Pt arrives to ED via EMS c/o fever for 4 days ranging from 102 - 104F.  Pt also reports chest pain to center of chest.  Pt received 162 ASA from EMS and reports improvement in pain.

## 2020-03-20 NOTE — ED PROVIDER NOTE - ATTENDING CONTRIBUTION TO CARE
Awake, Alert, Conversant.  Resting comfortably.  Breath sounds clear in all lung fields.  Tachycardic, regular heart rate without murmurs, rubs, or gallops.  Normal S1/S2.  Abdomen soft and nontender.  No lower extremity swelling or tenderness.  Oriented and conversant with fluent speech, moving all extremities with good strength.    Dr. Klein: I agree with the above provided history and exam and addend/modify it as follows.  65 F hx HTN, DM, abdominal hernia P/W fever x 4 days, dry cough, myalgias.  Concern for COVID-19.  Will evaluate for pneumonia.      I Александр Klein MD performed a history and physical exam of the patient and discussed their management with the resident and /or advanced care provider. I reviewed the resident and /or ACP's note and agree with the documented findings and plan of care. My medical decision making and observations are found above.

## 2020-03-20 NOTE — H&P ADULT - PROBLEM SELECTOR PLAN 3
Transitions of Care Status:  1.  Name of PCP:   2.  PCP Contacted on Admission: [ ] Y    [ ] N    3.  PCP contacted at Discharge: [ ] Y    [ ] N    [ ] N/A  4.  Post-Discharge Appointment Date and Location:  5.  Summary of Handoff given to PCP: - Follow up HbA1c.   - Fingerstick monitoring, sliding scale coverage as needed. - Follow up HbA1c.   - Not on Insulin, reports taking Metformin- hold while inpatient.   - Fingerstick monitoring, sliding scale coverage as needed.

## 2020-03-20 NOTE — ED ADULT NURSE NOTE - OBJECTIVE STATEMENT
received pt in room 5. pt A&Ox3, ambulatory with cane. pt coming in from home c/o fever for 4 days, pt also reports intermittent midsternal chest pain, headache, and shortness of breath.  Pt received 162 ASA from EMS and reports improvement in pain. Pt denies any pain or shortness of breath at this time. Appears to be stable, comfortable and in no apparent distress. Vitals as noted. Pt febrile orally. Airway patent, pt speaking in full sentences. respirations are equal and nonlabored, no respiratory distress noted, pt sating 97% on room air. Sinus tachycardic on monitor. Skin intact. +ROM in all extremities. Pt with abdominal hernia noted. Denies any other complaints at this time, denies cough, sore throat, abdominal pain, n & v. 20 gauge iv placed in the right ac, sepsis labs sent. pt medicated as per orders. iv fluids infusing. pt placed on airborne and contact precautions for r/o cvoid. pt stable at this time. will endorse report to day shift RN for further plan

## 2020-03-20 NOTE — H&P ADULT - NSHPSOCIALHISTORY_GEN_ALL_CORE
Works as a medical administrator.   Lives at home with her .   Ambulates with a cane, otherwise independent in all ADLs.   Denies any smoking, etoh, illicit drug use.

## 2020-03-20 NOTE — H&P ADULT - NSICDXPASTMEDICALHX_GEN_ALL_CORE_FT
PAST MEDICAL HISTORY:  Diabetes mellitus     Hyperlipidemia     Hypertension PAST MEDICAL HISTORY:  Diabetes mellitus     History of transient ischemic attack (TIA) 10 years ago, unclear history.    Hyperlipidemia     Hypertension     Neuropathy

## 2020-03-20 NOTE — ED PROVIDER NOTE - CLINICAL SUMMARY MEDICAL DECISION MAKING FREE TEXT BOX
luis enrique pgy2: 64 yo f pw cp/sob in setting of viral like illness. febrile on arrival, hds. well appearing. no sick contacts or travel. will obtain sepsis workup/rvp/covid 19. dispo pending reassessments and w/u.

## 2020-03-20 NOTE — H&P ADULT - NSICDXPASTSURGICALHX_GEN_ALL_CORE_FT
PAST SURGICAL HISTORY:  Abdominal hernia     H/O  section     H/O eye surgery     History of meniscal tear s/p surgery right knee

## 2020-03-20 NOTE — PHARMACOTHERAPY INTERVENTION NOTE - COMMENTS
Medication history is complete. Medication list updated in Outpatient Medication Record (OMR). Please call spectra [phone] if you have any questions.

## 2020-03-20 NOTE — H&P ADULT - PROBLEM SELECTOR PLAN 2
DVT ppx: - DVT ppx: Heparin SQ   - Airborne / Contact Isolation.   - Diet as tolerated. - Hold anti-hypertensives for now.   - Monitor BP trend.   - Assess medication list ??ACE/ARB. - Hold anti-hypertensives for now.   - Monitor BP trend.   - HOLD ARB (Losartan)

## 2020-03-20 NOTE — ED PROVIDER NOTE - PMH
Diabetes mellitus    DM (diabetes mellitus)    GERD (gastroesophageal reflux disease)    History of transient ischemic attack (TIA)  10 years ago, unclear history.  HTN (hypertension)    Hyperlipidemia    Hypertension    Neuropathy    Obesity (BMI 30-39.9)    PAD (peripheral artery disease)

## 2020-03-21 LAB
ANION GAP SERPL CALC-SCNC: 14 MMO/L — SIGNIFICANT CHANGE UP (ref 7–14)
BASOPHILS # BLD AUTO: 0.01 K/UL — SIGNIFICANT CHANGE UP (ref 0–0.2)
BASOPHILS NFR BLD AUTO: 0.3 % — SIGNIFICANT CHANGE UP (ref 0–2)
BUN SERPL-MCNC: 6 MG/DL — LOW (ref 7–23)
CALCIUM SERPL-MCNC: 8.5 MG/DL — SIGNIFICANT CHANGE UP (ref 8.4–10.5)
CHLORIDE SERPL-SCNC: 101 MMOL/L — SIGNIFICANT CHANGE UP (ref 98–107)
CO2 SERPL-SCNC: 26 MMOL/L — SIGNIFICANT CHANGE UP (ref 22–31)
CREAT SERPL-MCNC: 0.48 MG/DL — LOW (ref 0.5–1.3)
CULTURE RESULTS: SIGNIFICANT CHANGE UP
EOSINOPHIL # BLD AUTO: 0 K/UL — SIGNIFICANT CHANGE UP (ref 0–0.5)
EOSINOPHIL NFR BLD AUTO: 0 % — SIGNIFICANT CHANGE UP (ref 0–6)
GLUCOSE SERPL-MCNC: 128 MG/DL — HIGH (ref 70–99)
HBA1C BLD-MCNC: 7.1 % — HIGH (ref 4–5.6)
HCT VFR BLD CALC: 36.9 % — SIGNIFICANT CHANGE UP (ref 34.5–45)
HGB BLD-MCNC: 12 G/DL — SIGNIFICANT CHANGE UP (ref 11.5–15.5)
IMM GRANULOCYTES NFR BLD AUTO: 0.3 % — SIGNIFICANT CHANGE UP (ref 0–1.5)
LYMPHOCYTES # BLD AUTO: 0.77 K/UL — LOW (ref 1–3.3)
LYMPHOCYTES # BLD AUTO: 23.7 % — SIGNIFICANT CHANGE UP (ref 13–44)
MAGNESIUM SERPL-MCNC: 1.7 MG/DL — SIGNIFICANT CHANGE UP (ref 1.6–2.6)
MCHC RBC-ENTMCNC: 28 PG — SIGNIFICANT CHANGE UP (ref 27–34)
MCHC RBC-ENTMCNC: 32.5 % — SIGNIFICANT CHANGE UP (ref 32–36)
MCV RBC AUTO: 86 FL — SIGNIFICANT CHANGE UP (ref 80–100)
MONOCYTES # BLD AUTO: 0.17 K/UL — SIGNIFICANT CHANGE UP (ref 0–0.9)
MONOCYTES NFR BLD AUTO: 5.2 % — SIGNIFICANT CHANGE UP (ref 2–14)
NEUTROPHILS # BLD AUTO: 2.29 K/UL — SIGNIFICANT CHANGE UP (ref 1.8–7.4)
NEUTROPHILS NFR BLD AUTO: 70.5 % — SIGNIFICANT CHANGE UP (ref 43–77)
NRBC # FLD: 0 K/UL — SIGNIFICANT CHANGE UP (ref 0–0)
PHOSPHATE SERPL-MCNC: 2.3 MG/DL — LOW (ref 2.5–4.5)
PLATELET # BLD AUTO: 147 K/UL — LOW (ref 150–400)
PMV BLD: 11.1 FL — SIGNIFICANT CHANGE UP (ref 7–13)
POTASSIUM SERPL-MCNC: 3.4 MMOL/L — LOW (ref 3.5–5.3)
POTASSIUM SERPL-SCNC: 3.4 MMOL/L — LOW (ref 3.5–5.3)
RBC # BLD: 4.29 M/UL — SIGNIFICANT CHANGE UP (ref 3.8–5.2)
RBC # FLD: 14.3 % — SIGNIFICANT CHANGE UP (ref 10.3–14.5)
SODIUM SERPL-SCNC: 141 MMOL/L — SIGNIFICANT CHANGE UP (ref 135–145)
SPECIMEN SOURCE: SIGNIFICANT CHANGE UP
WBC # BLD: 3.25 K/UL — LOW (ref 3.8–10.5)
WBC # FLD AUTO: 3.25 K/UL — LOW (ref 3.8–10.5)

## 2020-03-21 PROCEDURE — 99233 SBSQ HOSP IP/OBS HIGH 50: CPT

## 2020-03-21 RX ORDER — INFLUENZA VIRUS VACCINE 15; 15; 15; 15 UG/.5ML; UG/.5ML; UG/.5ML; UG/.5ML
0.5 SUSPENSION INTRAMUSCULAR ONCE
Refills: 0 | Status: COMPLETED | OUTPATIENT
Start: 2020-03-21 | End: 2020-03-21

## 2020-03-21 RX ORDER — ALBUTEROL 90 UG/1
2 AEROSOL, METERED ORAL EVERY 6 HOURS
Refills: 0 | Status: DISCONTINUED | OUTPATIENT
Start: 2020-03-21 | End: 2020-03-29

## 2020-03-21 RX ORDER — BUDESONIDE AND FORMOTEROL FUMARATE DIHYDRATE 160; 4.5 UG/1; UG/1
2 AEROSOL RESPIRATORY (INHALATION)
Refills: 0 | Status: DISCONTINUED | OUTPATIENT
Start: 2020-03-21 | End: 2020-03-21

## 2020-03-21 RX ORDER — POTASSIUM CHLORIDE 20 MEQ
40 PACKET (EA) ORAL ONCE
Refills: 0 | Status: COMPLETED | OUTPATIENT
Start: 2020-03-21 | End: 2020-03-21

## 2020-03-21 RX ORDER — AZITHROMYCIN 500 MG/1
500 TABLET, FILM COATED ORAL DAILY
Refills: 0 | Status: DISCONTINUED | OUTPATIENT
Start: 2020-03-21 | End: 2020-03-22

## 2020-03-21 RX ORDER — POTASSIUM PHOSPHATE, MONOBASIC POTASSIUM PHOSPHATE, DIBASIC 236; 224 MG/ML; MG/ML
15 INJECTION, SOLUTION INTRAVENOUS ONCE
Refills: 0 | Status: COMPLETED | OUTPATIENT
Start: 2020-03-21 | End: 2020-03-21

## 2020-03-21 RX ORDER — ASCORBIC ACID 60 MG
1500 TABLET,CHEWABLE ORAL EVERY 6 HOURS
Refills: 0 | Status: COMPLETED | OUTPATIENT
Start: 2020-03-21 | End: 2020-03-25

## 2020-03-21 RX ADMIN — Medication 40 MILLIEQUIVALENT(S): at 12:36

## 2020-03-21 RX ADMIN — HEPARIN SODIUM 5000 UNIT(S): 5000 INJECTION INTRAVENOUS; SUBCUTANEOUS at 15:44

## 2020-03-21 RX ADMIN — HEPARIN SODIUM 5000 UNIT(S): 5000 INJECTION INTRAVENOUS; SUBCUTANEOUS at 05:55

## 2020-03-21 RX ADMIN — AZITHROMYCIN 255 MILLIGRAM(S): 500 TABLET, FILM COATED ORAL at 15:55

## 2020-03-21 RX ADMIN — HEPARIN SODIUM 5000 UNIT(S): 5000 INJECTION INTRAVENOUS; SUBCUTANEOUS at 23:21

## 2020-03-21 RX ADMIN — ALBUTEROL 2 PUFF(S): 90 AEROSOL, METERED ORAL at 23:21

## 2020-03-21 RX ADMIN — Medication 650 MILLIGRAM(S): at 12:50

## 2020-03-21 RX ADMIN — ALBUTEROL 2 PUFF(S): 90 AEROSOL, METERED ORAL at 16:00

## 2020-03-21 RX ADMIN — Medication 650 MILLIGRAM(S): at 12:36

## 2020-03-21 RX ADMIN — Medication 650 MILLIGRAM(S): at 05:54

## 2020-03-21 RX ADMIN — Medication 600 MILLIGRAM(S): at 19:30

## 2020-03-21 RX ADMIN — Medication 650 MILLIGRAM(S): at 07:45

## 2020-03-21 RX ADMIN — POTASSIUM PHOSPHATE, MONOBASIC POTASSIUM PHOSPHATE, DIBASIC 62.5 MILLIMOLE(S): 236; 224 INJECTION, SOLUTION INTRAVENOUS at 12:36

## 2020-03-21 RX ADMIN — Medication 650 MILLIGRAM(S): at 23:45

## 2020-03-21 NOTE — PROGRESS NOTE ADULT - PROBLEM SELECTOR PLAN 4
- [facility] - per pharm- no refills since 2014.  - Verbally: Losartan, Metformin, ASA (two tab daily).   - Requested Pharm Med Rec for assistance in Med rec.

## 2020-03-21 NOTE — PROGRESS NOTE ADULT - PROBLEM SELECTOR PLAN 1
- Associated with Fever, "Chest tightness." Diarrhea Sunday- >Monday.  - COVID PCR pending, 3/20 Bcx NGTD, RVP neg, UA neg  - Troponin negative x2. Mild leukopenia  - Chest X-ray negative  - Per ID, only start Plaquenil if patient develops hypoxia, worsening resp status.   - If has increasing O2 requirements, use Nonrebreather, monitor closely.   - Avoid BIPAP, High Flow, or Duonebs given risk of aerosolization.  - Supportive measures: Tylenol PRN fever/pain, avoid NSAIDS.  - If continued diarrhea- can consider stool studies, further imaging.  Start Albuterol HFA ATC, mucinex BID - Associated with Fever, "Chest tightness." Diarrhea Sunday- >Monday.  - COVID PCR pending, 3/20 Bcx NGTD, RVP neg, UA neg  - Troponin negative x2. Mild leukopenia  - Chest X-ray negative  - Per ID, only start Plaquenil if patient develops hypoxia, worsening resp status.   - If has increasing O2 requirements, use Nonrebreather, monitor closely.   - Avoid BIPAP, High Flow, or Duonebs given risk of aerosolization.  - Supportive measures: Tylenol PRN fever/pain, avoid NSAIDS.  - If continued diarrhea- can consider stool studies, further imaging.  Start Albuterol HFA ATC, mucinex BID. Start azithromycin 500mg q24

## 2020-03-21 NOTE — PROGRESS NOTE ADULT - SUBJECTIVE AND OBJECTIVE BOX
[facility] Division of Hospital Medicine  Melina Maradiaga DO  Pager (VERONIQUE-F, 8A-5P): [phone]      Patient is a 65y old  Female who presents with a chief complaint of Fever/Cough/SOB- RULE OUT COVID 19. (20 Mar 2020 13:54)      SUBJECTIVE / OVERNIGHT EVENTS: Febrile to 101.3 overnight. Reports dry cough, no chest pain or SOB. No N/V/D    MEDICATIONS  (STANDING):  ALBUTerol    90 MICROgram(s) HFA Inhaler 2 Puff(s) Inhalation every 6 hours  dextrose 5%. 1000 milliLiter(s) (50 mL/Hr) IV Continuous <Continuous>  dextrose 50% Injectable 12.5 Gram(s) IV Push once  dextrose 50% Injectable 25 Gram(s) IV Push once  dextrose 50% Injectable 25 Gram(s) IV Push once  guaiFENesin  milliGRAM(s) Oral every 12 hours  heparin  Injectable 5000 Unit(s) SubCutaneous every 8 hours  insulin lispro (HumaLOG) corrective regimen sliding scale   SubCutaneous Before meals and at bedtime    MEDICATIONS  (PRN):  acetaminophen   Tablet .. 650 milliGRAM(s) Oral every 6 hours PRN Temp greater or equal to 38C (100.4F), Mild Pain (1 - 3)  dextrose 40% Gel 15 Gram(s) Oral once PRN Blood Glucose LESS THAN 70 milliGRAM(s)/deciliter  glucagon  Injectable 1 milliGRAM(s) IntraMuscular once PRN Glucose LESS THAN 70 milligrams/deciliter      CAPILLARY BLOOD GLUCOSE      POCT Blood Glucose.: 137 mg/dL (21 Mar 2020 12:30)  POCT Blood Glucose.: 122 mg/dL (21 Mar 2020 07:15)  POCT Blood Glucose.: 109 mg/dL (20 Mar 2020 22:02)  POCT Blood Glucose.: 107 mg/dL (20 Mar 2020 20:14)  POCT Blood Glucose.: 167 mg/dL (20 Mar 2020 14:51)    I&O's Summary      PHYSICAL EXAM:  Vital Signs Last 24 Hrs  T(C): 39 (21 Mar 2020 12:31), Max: 39 (21 Mar 2020 12:31)  T(F): 102.2 (21 Mar 2020 12:31), Max: 102.2 (21 Mar 2020 12:31)  HR: 108 (21 Mar 2020 12:31) (83 - 108)  BP: 123/44 (21 Mar 2020 12:31) (108/60 - 138/66)  BP(mean): --  RR: 18 (21 Mar 2020 12:31) (18 - 22)  SpO2: 98% (21 Mar 2020 12:31) (97% - 100%)    CONSTITUTIONAL: NAD, on room air  HEENT: sclera clear  RESPIRATORY: Normal respiratory effort; speaking comfortably  CARDIOVASCULAR: No lower extremity edema  ABDOMEN: soft, Nontender, nondistended  PSYCH: calm, cooperative  NEUROLOGY: awake, alert  SKIN: No rashes visible    LABS:                        12.0   3.25  )-----------( 147      ( 21 Mar 2020 07:20 )             36.9     03-21    141  |  101  |  6<L>  ----------------------------<  128<H>  3.4<L>   |  26  |  0.48<L>    Ca    8.5      21 Mar 2020 06:00  Phos  2.3     03-21  Mg     1.7     -21    TPro  7.6  /  Alb  3.9  /  TBili  0.4  /  DBili  x   /  AST  46<H>  /  ALT  25  /  AlkPhos  94  03-20    PT/INR - ( 20 Mar 2020 07:42 )   PT: 13.2 SEC;   INR: 1.15          PTT - ( 20 Mar 2020 07:42 )  PTT:33.6 SEC      Urinalysis Basic - ( 20 Mar 2020 09:03 )    Color: YELLOW / Appearance: CLEAR / S.011 / pH: 6.5  Gluc: NEGATIVE / Ketone: SMALL  / Bili: NEGATIVE / Urobili: NORMAL   Blood: SMALL / Protein: 50 / Nitrite: NEGATIVE   Leuk Esterase: NEGATIVE / RBC: 3-5 / WBC 0-2   Sq Epi: FEW / Non Sq Epi: x / Bacteria: FEW        Culture - Blood (collected 20 Mar 2020 09:52)  Source: .Blood Blood-Peripheral  Preliminary Report (21 Mar 2020 10:01):    No growth to date.    Culture - Blood (collected 20 Mar 2020 09:52)  Source: .Blood Blood-Peripheral  Preliminary Report (21 Mar 2020 10:01):    No growth to date.    Culture - Urine (collected 20 Mar 2020 09:03)  Source: .Urine Clean Catch (Midstream)  Final Report (21 Mar 2020 08:27):    <10,000 CFU/mL Normal Urogenital Monika        RADIOLOGY & ADDITIONAL TESTS:  Results Reviewed:   Imaging Personally Reviewed:  Electrocardiogram Personally Reviewed:    COORDINATION OF CARE:  Care Discussed with Consultants/Other Providers [Y/N]:  Prior or Outpatient Records Reviewed [Y/N]:

## 2020-03-21 NOTE — PATIENT PROFILE ADULT - [id number]
Mercy no

## 2020-03-21 NOTE — PROGRESS NOTE ADULT - ASSESSMENT
65 year old female patient with PMH of HTN, HLD, DM, unrepaired abdominal hernia, who presented with complaints of fever, shortness of breath, "chest tightness", and tachycardia admitted to rule out COVID 19.

## 2020-03-21 NOTE — PROGRESS NOTE ADULT - PROBLEM SELECTOR PLAN 3
A1C: 7.1  - Not on Insulin, reports taking Metformin- hold while inpatient.   - Fingerstick monitoring, sliding scale coverage as needed.

## 2020-03-22 LAB
ALBUMIN SERPL ELPH-MCNC: 3.1 G/DL — LOW (ref 3.3–5)
ALP SERPL-CCNC: 114 U/L — SIGNIFICANT CHANGE UP (ref 40–120)
ALT FLD-CCNC: 33 U/L — SIGNIFICANT CHANGE UP (ref 4–33)
ANION GAP SERPL CALC-SCNC: 12 MMO/L — SIGNIFICANT CHANGE UP (ref 7–14)
AST SERPL-CCNC: 66 U/L — HIGH (ref 4–32)
BILIRUB SERPL-MCNC: 0.4 MG/DL — SIGNIFICANT CHANGE UP (ref 0.2–1.2)
BUN SERPL-MCNC: 5 MG/DL — LOW (ref 7–23)
CALCIUM SERPL-MCNC: 8.3 MG/DL — LOW (ref 8.4–10.5)
CHLORIDE SERPL-SCNC: 102 MMOL/L — SIGNIFICANT CHANGE UP (ref 98–107)
CO2 SERPL-SCNC: 26 MMOL/L — SIGNIFICANT CHANGE UP (ref 22–31)
CREAT SERPL-MCNC: 0.51 MG/DL — SIGNIFICANT CHANGE UP (ref 0.5–1.3)
CRP SERPL-MCNC: 76.7 MG/L — HIGH
GLUCOSE SERPL-MCNC: 131 MG/DL — HIGH (ref 70–99)
HCV AB S/CO SERPL IA: 0.25 S/CO — SIGNIFICANT CHANGE UP (ref 0–0.99)
HCV AB SERPL-IMP: SIGNIFICANT CHANGE UP
MAGNESIUM SERPL-MCNC: 1.6 MG/DL — SIGNIFICANT CHANGE UP (ref 1.6–2.6)
PHOSPHATE SERPL-MCNC: 3.3 MG/DL — SIGNIFICANT CHANGE UP (ref 2.5–4.5)
POTASSIUM SERPL-MCNC: 3.4 MMOL/L — LOW (ref 3.5–5.3)
POTASSIUM SERPL-SCNC: 3.4 MMOL/L — LOW (ref 3.5–5.3)
PROT SERPL-MCNC: 6.3 G/DL — SIGNIFICANT CHANGE UP (ref 6–8.3)
SARS-COV-2 RNA SPEC QL NAA+PROBE: DETECTED
SODIUM SERPL-SCNC: 140 MMOL/L — SIGNIFICANT CHANGE UP (ref 135–145)

## 2020-03-22 PROCEDURE — 99233 SBSQ HOSP IP/OBS HIGH 50: CPT

## 2020-03-22 RX ORDER — ACETAMINOPHEN 500 MG
1000 TABLET ORAL ONCE
Refills: 0 | Status: COMPLETED | OUTPATIENT
Start: 2020-03-22 | End: 2020-03-22

## 2020-03-22 RX ORDER — AZITHROMYCIN 500 MG/1
500 TABLET, FILM COATED ORAL DAILY
Refills: 0 | Status: DISCONTINUED | OUTPATIENT
Start: 2020-03-22 | End: 2020-03-23

## 2020-03-22 RX ORDER — POTASSIUM CHLORIDE 20 MEQ
20 PACKET (EA) ORAL ONCE
Refills: 0 | Status: COMPLETED | OUTPATIENT
Start: 2020-03-22 | End: 2020-03-22

## 2020-03-22 RX ADMIN — Medication 153 MILLIGRAM(S): at 12:51

## 2020-03-22 RX ADMIN — HEPARIN SODIUM 5000 UNIT(S): 5000 INJECTION INTRAVENOUS; SUBCUTANEOUS at 15:12

## 2020-03-22 RX ADMIN — Medication 650 MILLIGRAM(S): at 19:15

## 2020-03-22 RX ADMIN — ALBUTEROL 2 PUFF(S): 90 AEROSOL, METERED ORAL at 12:50

## 2020-03-22 RX ADMIN — Medication 153 MILLIGRAM(S): at 18:11

## 2020-03-22 RX ADMIN — Medication 650 MILLIGRAM(S): at 18:20

## 2020-03-22 RX ADMIN — Medication 153 MILLIGRAM(S): at 00:41

## 2020-03-22 RX ADMIN — Medication 20 MILLIEQUIVALENT(S): at 18:20

## 2020-03-22 RX ADMIN — Medication 600 MILLIGRAM(S): at 06:17

## 2020-03-22 RX ADMIN — ALBUTEROL 2 PUFF(S): 90 AEROSOL, METERED ORAL at 22:29

## 2020-03-22 RX ADMIN — Medication 153 MILLIGRAM(S): at 23:35

## 2020-03-22 RX ADMIN — HEPARIN SODIUM 5000 UNIT(S): 5000 INJECTION INTRAVENOUS; SUBCUTANEOUS at 22:29

## 2020-03-22 RX ADMIN — Medication 400 MILLIGRAM(S): at 01:42

## 2020-03-22 RX ADMIN — Medication 650 MILLIGRAM(S): at 23:35

## 2020-03-22 RX ADMIN — Medication 153 MILLIGRAM(S): at 12:50

## 2020-03-22 RX ADMIN — Medication 600 MILLIGRAM(S): at 18:11

## 2020-03-22 RX ADMIN — Medication 153 MILLIGRAM(S): at 06:18

## 2020-03-22 RX ADMIN — AZITHROMYCIN 500 MILLIGRAM(S): 500 TABLET, FILM COATED ORAL at 15:11

## 2020-03-22 RX ADMIN — HEPARIN SODIUM 5000 UNIT(S): 5000 INJECTION INTRAVENOUS; SUBCUTANEOUS at 06:18

## 2020-03-22 RX ADMIN — ALBUTEROL 2 PUFF(S): 90 AEROSOL, METERED ORAL at 06:19

## 2020-03-22 RX ADMIN — Medication 650 MILLIGRAM(S): at 00:45

## 2020-03-22 NOTE — PROGRESS NOTE ADULT - SUBJECTIVE AND OBJECTIVE BOX
[facility] Division of Hospital Medicine  Melina Maradiaga DO  Pager (VERONIQUE-F, 8A-5P): [phone]      Patient is a 65y old  Female who presents with a chief complaint of Fever/Cough/SOB- RULE OUT COVID 19. (21 Mar 2020 13:03)      SUBJECTIVE / OVERNIGHT EVENTS: Tmax of 102.6. States she felt more like herself today. Denies SOB, cough, N/V/D. Unable to lie prone due to abdominal hernia and distended abdomen    MEDICATIONS  (STANDING):  ALBUTerol    90 MICROgram(s) HFA Inhaler 2 Puff(s) Inhalation every 6 hours  ascorbic acid IVPB 1500 milliGRAM(s) IV Intermittent every 6 hours  azithromycin   Tablet 500 milliGRAM(s) Oral daily  dextrose 5%. 1000 milliLiter(s) (50 mL/Hr) IV Continuous <Continuous>  dextrose 50% Injectable 12.5 Gram(s) IV Push once  dextrose 50% Injectable 25 Gram(s) IV Push once  dextrose 50% Injectable 25 Gram(s) IV Push once  guaiFENesin  milliGRAM(s) Oral every 12 hours  heparin  Injectable 5000 Unit(s) SubCutaneous every 8 hours  insulin lispro (HumaLOG) corrective regimen sliding scale   SubCutaneous Before meals and at bedtime  potassium chloride    Tablet ER 20 milliEquivalent(s) Oral once    MEDICATIONS  (PRN):  acetaminophen   Tablet .. 650 milliGRAM(s) Oral every 6 hours PRN Temp greater or equal to 38C (100.4F), Mild Pain (1 - 3)  dextrose 40% Gel 15 Gram(s) Oral once PRN Blood Glucose LESS THAN 70 milliGRAM(s)/deciliter  glucagon  Injectable 1 milliGRAM(s) IntraMuscular once PRN Glucose LESS THAN 70 milligrams/deciliter      CAPILLARY BLOOD GLUCOSE      POCT Blood Glucose.: 135 mg/dL (22 Mar 2020 12:02)  POCT Blood Glucose.: 127 mg/dL (22 Mar 2020 07:33)  POCT Blood Glucose.: 128 mg/dL (21 Mar 2020 22:11)  POCT Blood Glucose.: 133 mg/dL (21 Mar 2020 17:20)    I&O's Summary      PHYSICAL EXAM:  Vital Signs Last 24 Hrs  T(C): 37.1 (22 Mar 2020 12:48), Max: 39.2 (21 Mar 2020 21:18)  T(F): 98.8 (22 Mar 2020 12:48), Max: 102.6 (21 Mar 2020 21:18)  HR: 92 (22 Mar 2020 12:48) (89 - 105)  BP: 147/81 (22 Mar 2020 12:48) (130/71 - 156/60)  BP(mean): --  RR: 18 (22 Mar 2020 12:48) (17 - 18)  SpO2: 98% (22 Mar 2020 12:48) (95% - 99%)    CONSTITUTIONAL: NAD, on room air  HEENT: sclera clear  RESPIRATORY: Normal respiratory effort; speaking comfortably  CARDIOVASCULAR: No lower extremity edema  ABDOMEN: soft, Nontender, distended,  + abdominal hernia  PSYCH: calm, cooperative  NEUROLOGY: awake, alert  SKIN: No rashes visible    LABS:                        12.0   3.25  )-----------( 147      ( 21 Mar 2020 07:20 )             36.9     03-22    140  |  102  |  5<L>  ----------------------------<  131<H>  3.4<L>   |  26  |  0.51    Ca    8.3<L>      22 Mar 2020 06:52  Phos  3.3     03-22  Mg     1.6     03-22    TPro  6.3  /  Alb  3.1<L>  /  TBili  0.4  /  DBili  x   /  AST  66<H>  /  ALT  33  /  AlkPhos  114  03-22              Culture - Blood (collected 20 Mar 2020 09:52)  Source: .Blood Blood-Peripheral  Preliminary Report (21 Mar 2020 10:01):    No growth to date.    Culture - Blood (collected 20 Mar 2020 09:52)  Source: .Blood Blood-Peripheral  Preliminary Report (21 Mar 2020 10:01):    No growth to date.    Culture - Urine (collected 20 Mar 2020 09:03)  Source: .Urine Clean Catch (Midstream)  Final Report (21 Mar 2020 08:27):    <10,000 CFU/mL Normal Urogenital Monika        RADIOLOGY & ADDITIONAL TESTS:  Results Reviewed:   Imaging Personally Reviewed:  Electrocardiogram Personally Reviewed:    COORDINATION OF CARE:  Care Discussed with Consultants/Other Providers [Y/N]:  Prior or Outpatient Records Reviewed [Y/N]:

## 2020-03-22 NOTE — PROGRESS NOTE ADULT - PROBLEM SELECTOR PLAN 4
- [facility] - per pharm- no refills since 2014.  - Verbally: Losartan, Metformin, ASA (two tab daily).   med rec completed by pharmacist

## 2020-03-22 NOTE — PROGRESS NOTE ADULT - ASSESSMENT
65 year old female patient with PMH of HTN, HLD, DM, unrepaired abdominal hernia, who presented with complaints of fever, shortness of breath, "chest tightness", and tachycardia. Found to be + COVID 19.

## 2020-03-22 NOTE — PROGRESS NOTE ADULT - PROBLEM SELECTOR PLAN 1
+ COVID 19  3/20 Bcx NGTD, RVP neg, UA neg  - Troponin negative x2. Mild leukopenia, Chest X-ray negative  - Per ID, only start Plaquenil if patient develops hypoxia, worsening resp status.   - If has increasing O2 requirements, use Nonrebreather, monitor closely.   - Avoid BIPAP, High Flow, or Duonebs given risk of aerosolization.  - Supportive measures: Tylenol PRN fever/pain, avoid NSAIDS.  - If continued diarrhea- can consider stool studies, further imaging.  Albuterol HFA ATC, mucinex BID, azithromycin 500mg q24  Can't lie prone due to body habitus therefore patient to lie on sides every few hours instead of flat on back  Continue incentive spirometry

## 2020-03-23 LAB
ALBUMIN SERPL ELPH-MCNC: 2.9 G/DL — LOW (ref 3.3–5)
ALP SERPL-CCNC: 127 U/L — HIGH (ref 40–120)
ALT FLD-CCNC: 41 U/L — HIGH (ref 4–33)
ANION GAP SERPL CALC-SCNC: 16 MMO/L — HIGH (ref 7–14)
AST SERPL-CCNC: 81 U/L — HIGH (ref 4–32)
BASOPHILS # BLD AUTO: 0.01 K/UL — SIGNIFICANT CHANGE UP (ref 0–0.2)
BASOPHILS NFR BLD AUTO: 0.2 % — SIGNIFICANT CHANGE UP (ref 0–2)
BASOPHILS NFR SPEC: 1.8 % — SIGNIFICANT CHANGE UP (ref 0–2)
BILIRUB SERPL-MCNC: 0.4 MG/DL — SIGNIFICANT CHANGE UP (ref 0.2–1.2)
BLASTS # FLD: 0 % — SIGNIFICANT CHANGE UP (ref 0–0)
BUN SERPL-MCNC: 5 MG/DL — LOW (ref 7–23)
CALCIUM SERPL-MCNC: 8.2 MG/DL — LOW (ref 8.4–10.5)
CHLORIDE SERPL-SCNC: 105 MMOL/L — SIGNIFICANT CHANGE UP (ref 98–107)
CO2 SERPL-SCNC: 22 MMOL/L — SIGNIFICANT CHANGE UP (ref 22–31)
CREAT SERPL-MCNC: 0.45 MG/DL — LOW (ref 0.5–1.3)
CRP SERPL HS-MCNC: 115.01 MG/L — SIGNIFICANT CHANGE UP
CRP SERPL-MCNC: 110.6 MG/L — HIGH
EOSINOPHIL # BLD AUTO: 0 K/UL — SIGNIFICANT CHANGE UP (ref 0–0.5)
EOSINOPHIL NFR BLD AUTO: 0 % — SIGNIFICANT CHANGE UP (ref 0–6)
EOSINOPHIL NFR FLD: 0 % — SIGNIFICANT CHANGE UP (ref 0–6)
ERYTHROCYTE [SEDIMENTATION RATE] IN BLOOD: SIGNIFICANT CHANGE UP MM/HR (ref 4–25)
GIANT PLATELETS BLD QL SMEAR: PRESENT — SIGNIFICANT CHANGE UP
GLUCOSE SERPL-MCNC: 98 MG/DL — SIGNIFICANT CHANGE UP (ref 70–99)
HCT VFR BLD CALC: 36.8 % — SIGNIFICANT CHANGE UP (ref 34.5–45)
HGB BLD-MCNC: 11.7 G/DL — SIGNIFICANT CHANGE UP (ref 11.5–15.5)
IMM GRANULOCYTES NFR BLD AUTO: 1 % — SIGNIFICANT CHANGE UP (ref 0–1.5)
LYMPHOCYTES # BLD AUTO: 1.09 K/UL — SIGNIFICANT CHANGE UP (ref 1–3.3)
LYMPHOCYTES # BLD AUTO: 25.9 % — SIGNIFICANT CHANGE UP (ref 13–44)
LYMPHOCYTES NFR SPEC AUTO: 12.4 % — LOW (ref 13–44)
MAGNESIUM SERPL-MCNC: 1.7 MG/DL — SIGNIFICANT CHANGE UP (ref 1.6–2.6)
MCHC RBC-ENTMCNC: 27.8 PG — SIGNIFICANT CHANGE UP (ref 27–34)
MCHC RBC-ENTMCNC: 31.8 % — LOW (ref 32–36)
MCV RBC AUTO: 87.4 FL — SIGNIFICANT CHANGE UP (ref 80–100)
METAMYELOCYTES # FLD: 0 % — SIGNIFICANT CHANGE UP (ref 0–1)
MONOCYTES # BLD AUTO: 0.21 K/UL — SIGNIFICANT CHANGE UP (ref 0–0.9)
MONOCYTES NFR BLD AUTO: 5 % — SIGNIFICANT CHANGE UP (ref 2–14)
MONOCYTES NFR BLD: 1.8 % — LOW (ref 2–9)
MYELOCYTES NFR BLD: 0 % — SIGNIFICANT CHANGE UP (ref 0–0)
NEUTROPHIL AB SER-ACNC: 80.5 % — HIGH (ref 43–77)
NEUTROPHILS # BLD AUTO: 2.86 K/UL — SIGNIFICANT CHANGE UP (ref 1.8–7.4)
NEUTROPHILS NFR BLD AUTO: 67.9 % — SIGNIFICANT CHANGE UP (ref 43–77)
NEUTS BAND # BLD: 0 % — SIGNIFICANT CHANGE UP (ref 0–6)
NRBC # FLD: 0 K/UL — SIGNIFICANT CHANGE UP (ref 0–0)
OTHER - HEMATOLOGY %: 0 — SIGNIFICANT CHANGE UP
PHOSPHATE SERPL-MCNC: 2.7 MG/DL — SIGNIFICANT CHANGE UP (ref 2.5–4.5)
PLATELET # BLD AUTO: 165 K/UL — SIGNIFICANT CHANGE UP (ref 150–400)
PLATELET COUNT - ESTIMATE: NORMAL — SIGNIFICANT CHANGE UP
PMV BLD: 10.5 FL — SIGNIFICANT CHANGE UP (ref 7–13)
POTASSIUM SERPL-MCNC: 3.9 MMOL/L — SIGNIFICANT CHANGE UP (ref 3.5–5.3)
POTASSIUM SERPL-SCNC: 3.9 MMOL/L — SIGNIFICANT CHANGE UP (ref 3.5–5.3)
PROMYELOCYTES # FLD: 0 % — SIGNIFICANT CHANGE UP (ref 0–0)
PROT SERPL-MCNC: 6.8 G/DL — SIGNIFICANT CHANGE UP (ref 6–8.3)
RBC # BLD: 4.21 M/UL — SIGNIFICANT CHANGE UP (ref 3.8–5.2)
RBC # FLD: 14.6 % — HIGH (ref 10.3–14.5)
REVIEW TO FOLLOW: YES — SIGNIFICANT CHANGE UP
SMUDGE CELLS # BLD: PRESENT — SIGNIFICANT CHANGE UP
SODIUM SERPL-SCNC: 143 MMOL/L — SIGNIFICANT CHANGE UP (ref 135–145)
VARIANT LYMPHS # BLD: 3.5 % — SIGNIFICANT CHANGE UP
WBC # BLD: 4.21 K/UL — SIGNIFICANT CHANGE UP (ref 3.8–10.5)
WBC # FLD AUTO: 4.21 K/UL — SIGNIFICANT CHANGE UP (ref 3.8–10.5)

## 2020-03-23 PROCEDURE — 99233 SBSQ HOSP IP/OBS HIGH 50: CPT

## 2020-03-23 RX ORDER — HYDROXYCHLOROQUINE SULFATE 200 MG
200 TABLET ORAL EVERY 12 HOURS
Refills: 0 | Status: COMPLETED | OUTPATIENT
Start: 2020-03-24 | End: 2020-03-28

## 2020-03-23 RX ORDER — HYDROXYCHLOROQUINE SULFATE 200 MG
400 TABLET ORAL EVERY 12 HOURS
Refills: 0 | Status: COMPLETED | OUTPATIENT
Start: 2020-03-23 | End: 2020-03-24

## 2020-03-23 RX ADMIN — ALBUTEROL 2 PUFF(S): 90 AEROSOL, METERED ORAL at 21:45

## 2020-03-23 RX ADMIN — Medication 153 MILLIGRAM(S): at 05:55

## 2020-03-23 RX ADMIN — ALBUTEROL 2 PUFF(S): 90 AEROSOL, METERED ORAL at 10:17

## 2020-03-23 RX ADMIN — Medication 103 MILLIGRAM(S): at 17:37

## 2020-03-23 RX ADMIN — AZITHROMYCIN 500 MILLIGRAM(S): 500 TABLET, FILM COATED ORAL at 12:38

## 2020-03-23 RX ADMIN — HEPARIN SODIUM 5000 UNIT(S): 5000 INJECTION INTRAVENOUS; SUBCUTANEOUS at 14:14

## 2020-03-23 RX ADMIN — Medication 1: at 12:37

## 2020-03-23 RX ADMIN — Medication 650 MILLIGRAM(S): at 17:37

## 2020-03-23 RX ADMIN — ALBUTEROL 2 PUFF(S): 90 AEROSOL, METERED ORAL at 05:55

## 2020-03-23 RX ADMIN — Medication 103 MILLIGRAM(S): at 12:36

## 2020-03-23 RX ADMIN — HEPARIN SODIUM 5000 UNIT(S): 5000 INJECTION INTRAVENOUS; SUBCUTANEOUS at 21:45

## 2020-03-23 RX ADMIN — Medication 650 MILLIGRAM(S): at 01:03

## 2020-03-23 RX ADMIN — Medication 650 MILLIGRAM(S): at 18:30

## 2020-03-23 RX ADMIN — HEPARIN SODIUM 5000 UNIT(S): 5000 INJECTION INTRAVENOUS; SUBCUTANEOUS at 05:55

## 2020-03-23 RX ADMIN — Medication 400 MILLIGRAM(S): at 14:13

## 2020-03-23 RX ADMIN — Medication 600 MILLIGRAM(S): at 17:37

## 2020-03-23 RX ADMIN — Medication 650 MILLIGRAM(S): at 10:17

## 2020-03-23 RX ADMIN — Medication 650 MILLIGRAM(S): at 09:17

## 2020-03-23 RX ADMIN — ALBUTEROL 2 PUFF(S): 90 AEROSOL, METERED ORAL at 17:37

## 2020-03-23 RX ADMIN — Medication 600 MILLIGRAM(S): at 05:55

## 2020-03-23 NOTE — PROGRESS NOTE ADULT - SUBJECTIVE AND OBJECTIVE BOX
[facility] Division of Hospital Medicine  Ricky Helton MD  Pager [phone]      Patient is a 65y old  Female who presents with a chief complaint of Fever/Cough/SOB- RULE OUT COVID 19. (22 Mar 2020 13:53)      SUBJECTIVE / OVERNIGHT EVENTS: Pt endorsing persistent feeling of "being sick". Diarrhea improved    MEDICATIONS  (STANDING):  ALBUTerol    90 MICROgram(s) HFA Inhaler 2 Puff(s) Inhalation every 6 hours  ascorbic acid IVPB 1500 milliGRAM(s) IV Intermittent every 6 hours  dextrose 5%. 1000 milliLiter(s) (50 mL/Hr) IV Continuous <Continuous>  dextrose 50% Injectable 12.5 Gram(s) IV Push once  dextrose 50% Injectable 25 Gram(s) IV Push once  dextrose 50% Injectable 25 Gram(s) IV Push once  guaiFENesin  milliGRAM(s) Oral every 12 hours  heparin  Injectable 5000 Unit(s) SubCutaneous every 8 hours  hydroxychloroquine 400 milliGRAM(s) Oral every 12 hours  insulin lispro (HumaLOG) corrective regimen sliding scale   SubCutaneous Before meals and at bedtime    MEDICATIONS  (PRN):  acetaminophen   Tablet .. 650 milliGRAM(s) Oral every 6 hours PRN Temp greater or equal to 38C (100.4F), Mild Pain (1 - 3)  dextrose 40% Gel 15 Gram(s) Oral once PRN Blood Glucose LESS THAN 70 milliGRAM(s)/deciliter  glucagon  Injectable 1 milliGRAM(s) IntraMuscular once PRN Glucose LESS THAN 70 milligrams/deciliter      CAPILLARY BLOOD GLUCOSE      POCT Blood Glucose.: 157 mg/dL (23 Mar 2020 12:20)  POCT Blood Glucose.: 117 mg/dL (23 Mar 2020 07:56)  POCT Blood Glucose.: 110 mg/dL (22 Mar 2020 22:01)  POCT Blood Glucose.: 131 mg/dL (22 Mar 2020 17:13)    I&O's Summary    23 Mar 2020 07:01  -  23 Mar 2020 15:10  --------------------------------------------------------  IN: 0 mL / OUT: 100 mL / NET: -100 mL        PHYSICAL EXAM:  Vital Signs Last 24 Hrs  T(C): 38.2 (23 Mar 2020 14:10), Max: 39 (22 Mar 2020 22:04)  T(F): 100.7 (23 Mar 2020 14:10), Max: 102.2 (22 Mar 2020 22:04)  HR: 100 (23 Mar 2020 14:10) (94 - 103)  BP: 134/55 (23 Mar 2020 14:10) (120/57 - 152/80)  BP(mean): --  RR: 19 (23 Mar 2020 14:10) (18 - 19)  SpO2: 96% (23 Mar 2020 14:10) (95% - 100%)    CONSTITUTIONAL: NAD  EYES: conjunctiva and sclera clear  ENMT: mmm  NECK: Supple,  RESPIRATORY: poor body habitus, decreased breath sound   CARDIOVASCULAR: Regular rate and rhythm, + S1 and S2  ABDOMEN: Nontender to palpation, normoactive bowel sounds, no rebound/guarding  MUSCULOSKELETAL:  no clubbing or cyanosis of digits;   PSYCH: A+O x 2-3      LABS:                        11.7   4.21  )-----------( 165      ( 23 Mar 2020 07:09 )             36.8     03-23    143  |  105  |  5<L>  ----------------------------<  98  3.9   |  22  |  0.45<L>    Ca    8.2<L>      23 Mar 2020 07:09  Phos  2.7     03-23  Mg     1.7     03-23    TPro  6.8  /  Alb  2.9<L>  /  TBili  0.4  /  DBili  x   /  AST  81<H>  /  ALT  41<H>  /  AlkPhos  127<H>  03-23

## 2020-03-23 NOTE — PROVIDER CONTACT NOTE (OTHER) - ASSESSMENT
Pt with c/o pleuritic chest pain. Pt states pain is worse with coughing. Pt states pain is in the middle of her chest. Asymptomatic otherwise. No other s&s of distress noted.

## 2020-03-23 NOTE — PROGRESS NOTE ADULT - PROBLEM SELECTOR PLAN 1
+ COVID 19  3/20 Bcx NGTD, RVP neg, UA neg  - Pt on RA but Sat 95% ( was 100% initially), CRP worsening, LFTs uptrending, will start Plaquenil  - Supportive measures: Tylenol PRN fever/pain, avoid NSAIDS.  - If continued diarrhea- can consider stool studies, further imaging.  Albuterol HFA ATC, mucinex BID, azithromycin 500mg q24  Can't lie prone due to body habitus therefore patient to lie on sides every few hours instead of flat on back  Continue incentive spirometry

## 2020-03-24 LAB
ALBUMIN SERPL ELPH-MCNC: 3 G/DL — LOW (ref 3.3–5)
ALP SERPL-CCNC: 146 U/L — HIGH (ref 40–120)
ALT FLD-CCNC: 47 U/L — HIGH (ref 4–33)
ANION GAP SERPL CALC-SCNC: 13 MMO/L — SIGNIFICANT CHANGE UP (ref 7–14)
AST SERPL-CCNC: 94 U/L — HIGH (ref 4–32)
BILIRUB SERPL-MCNC: 0.5 MG/DL — SIGNIFICANT CHANGE UP (ref 0.2–1.2)
BUN SERPL-MCNC: 5 MG/DL — LOW (ref 7–23)
CALCIUM SERPL-MCNC: 8.2 MG/DL — LOW (ref 8.4–10.5)
CHLORIDE SERPL-SCNC: 103 MMOL/L — SIGNIFICANT CHANGE UP (ref 98–107)
CO2 SERPL-SCNC: 25 MMOL/L — SIGNIFICANT CHANGE UP (ref 22–31)
CREAT SERPL-MCNC: 0.52 MG/DL — SIGNIFICANT CHANGE UP (ref 0.5–1.3)
GLUCOSE SERPL-MCNC: 114 MG/DL — HIGH (ref 70–99)
MAGNESIUM SERPL-MCNC: 1.8 MG/DL — SIGNIFICANT CHANGE UP (ref 1.6–2.6)
PHOSPHATE SERPL-MCNC: 3.2 MG/DL — SIGNIFICANT CHANGE UP (ref 2.5–4.5)
POTASSIUM SERPL-MCNC: 3.3 MMOL/L — LOW (ref 3.5–5.3)
POTASSIUM SERPL-SCNC: 3.3 MMOL/L — LOW (ref 3.5–5.3)
PROT SERPL-MCNC: 6.7 G/DL — SIGNIFICANT CHANGE UP (ref 6–8.3)
SODIUM SERPL-SCNC: 141 MMOL/L — SIGNIFICANT CHANGE UP (ref 135–145)

## 2020-03-24 PROCEDURE — 99233 SBSQ HOSP IP/OBS HIGH 50: CPT

## 2020-03-24 PROCEDURE — 71250 CT THORAX DX C-: CPT | Mod: 26

## 2020-03-24 RX ORDER — POTASSIUM CHLORIDE 20 MEQ
40 PACKET (EA) ORAL ONCE
Refills: 0 | Status: COMPLETED | OUTPATIENT
Start: 2020-03-24 | End: 2020-03-24

## 2020-03-24 RX ORDER — AZITHROMYCIN 500 MG/1
500 TABLET, FILM COATED ORAL DAILY
Refills: 0 | Status: DISCONTINUED | OUTPATIENT
Start: 2020-03-24 | End: 2020-03-26

## 2020-03-24 RX ADMIN — ALBUTEROL 2 PUFF(S): 90 AEROSOL, METERED ORAL at 09:10

## 2020-03-24 RX ADMIN — Medication 103 MILLIGRAM(S): at 13:35

## 2020-03-24 RX ADMIN — Medication 103 MILLIGRAM(S): at 18:39

## 2020-03-24 RX ADMIN — Medication 650 MILLIGRAM(S): at 05:58

## 2020-03-24 RX ADMIN — Medication 103 MILLIGRAM(S): at 05:59

## 2020-03-24 RX ADMIN — Medication 600 MILLIGRAM(S): at 18:14

## 2020-03-24 RX ADMIN — Medication 40 MILLIEQUIVALENT(S): at 16:07

## 2020-03-24 RX ADMIN — Medication 650 MILLIGRAM(S): at 18:15

## 2020-03-24 RX ADMIN — Medication 600 MILLIGRAM(S): at 05:59

## 2020-03-24 RX ADMIN — ALBUTEROL 2 PUFF(S): 90 AEROSOL, METERED ORAL at 16:07

## 2020-03-24 RX ADMIN — AZITHROMYCIN 500 MILLIGRAM(S): 500 TABLET, FILM COATED ORAL at 20:21

## 2020-03-24 RX ADMIN — Medication 200 MILLIGRAM(S): at 13:42

## 2020-03-24 RX ADMIN — Medication 103 MILLIGRAM(S): at 00:17

## 2020-03-24 RX ADMIN — HEPARIN SODIUM 5000 UNIT(S): 5000 INJECTION INTRAVENOUS; SUBCUTANEOUS at 13:37

## 2020-03-24 RX ADMIN — Medication 650 MILLIGRAM(S): at 02:01

## 2020-03-24 RX ADMIN — Medication 400 MILLIGRAM(S): at 02:01

## 2020-03-24 RX ADMIN — ALBUTEROL 2 PUFF(S): 90 AEROSOL, METERED ORAL at 05:59

## 2020-03-24 RX ADMIN — HEPARIN SODIUM 5000 UNIT(S): 5000 INJECTION INTRAVENOUS; SUBCUTANEOUS at 05:59

## 2020-03-24 NOTE — PROGRESS NOTE ADULT - SUBJECTIVE AND OBJECTIVE BOX
[facility] Division of Hospital Medicine  Ricky Helton MD  Pager [phone]      Patient is a 65y old  Female who presents with a chief complaint of Fever/Cough/SOB- RULE OUT COVID 19. (23 Mar 2020 15:10)      SUBJECTIVE / OVERNIGHT EVENTS: Persisting SOB and [place]. No Diarrhea    MEDICATIONS  (STANDING):  ALBUTerol    90 MICROgram(s) HFA Inhaler 2 Puff(s) Inhalation every 6 hours  ascorbic acid IVPB 1500 milliGRAM(s) IV Intermittent every 6 hours  dextrose 5%. 1000 milliLiter(s) (50 mL/Hr) IV Continuous <Continuous>  dextrose 50% Injectable 12.5 Gram(s) IV Push once  dextrose 50% Injectable 25 Gram(s) IV Push once  dextrose 50% Injectable 25 Gram(s) IV Push once  guaiFENesin  milliGRAM(s) Oral every 12 hours  heparin  Injectable 5000 Unit(s) SubCutaneous every 8 hours  hydroxychloroquine 200 milliGRAM(s) Oral every 12 hours  insulin lispro (HumaLOG) corrective regimen sliding scale   SubCutaneous Before meals and at bedtime    MEDICATIONS  (PRN):  acetaminophen   Tablet .. 650 milliGRAM(s) Oral every 6 hours PRN Temp greater or equal to 38C (100.4F), Mild Pain (1 - 3)  dextrose 40% Gel 15 Gram(s) Oral once PRN Blood Glucose LESS THAN 70 milliGRAM(s)/deciliter  glucagon  Injectable 1 milliGRAM(s) IntraMuscular once PRN Glucose LESS THAN 70 milligrams/deciliter      CAPILLARY BLOOD GLUCOSE      POCT Blood Glucose.: 109 mg/dL (24 Mar 2020 11:45)  POCT Blood Glucose.: 101 mg/dL (24 Mar 2020 07:21)  POCT Blood Glucose.: 112 mg/dL (23 Mar 2020 23:32)  POCT Blood Glucose.: 120 mg/dL (23 Mar 2020 17:22)    I&O's Summary    23 Mar 2020 07:01  -  24 Mar 2020 07:00  --------------------------------------------------------  IN: 0 mL / OUT: 100 mL / NET: -100 mL        PHYSICAL EXAM:  Vital Signs Last 24 Hrs  T(C): 36.7 (24 Mar 2020 13:32), Max: 39.3 (23 Mar 2020 17:42)  T(F): 98.1 (24 Mar 2020 13:32), Max: 102.8 (23 Mar 2020 17:42)  HR: 99 (24 Mar 2020 13:32) (98 - 110)  BP: 159/86 (24 Mar 2020 13:32) (141/45 - 173/71)  BP(mean): --  RR: 20 (24 Mar 2020 13:32) (18 - 20)  SpO2: 97% (24 Mar 2020 13:32) (92% - 100%)    CONSTITUTIONAL:  mild resp distress, obese  EYES: conjunctiva and sclera clear  ENMT: mmm  NECK: Supple,  RESPIRATORY: bibasilar crackles  CARDIOVASCULAR: Regular rate and rhythm, + S1 and S2  ABDOMEN: Nontender to palpation, normoactive bowel sounds, no rebound/guarding  MUSCULOSKELETAL:  no clubbing or cyanosis of digits;   PSYCH: A+O x3      LABS:                        11.7   4.21  )-----------( 165      ( 23 Mar 2020 07:09 )             36.8     03-24    141  |  103  |  5<L>  ----------------------------<  114<H>  3.3<L>   |  25  |  0.52    Ca    8.2<L>      24 Mar 2020 07:18  Phos  3.2     03-24  Mg     1.8     03-24    TPro  6.7  /  Alb  3.0<L>  /  TBili  0.5  /  DBili  x   /  AST  94<H>  /  ALT  47<H>  /  AlkPhos  146<H>  03-24

## 2020-03-24 NOTE — PROGRESS NOTE ADULT - PROBLEM SELECTOR PLAN 1
+ COVID 19  3/20 Bcx NGTD, RVP neg, UA neg  - Pt on RA but Sat 95% ( was 100% initially),  LFTs uptrending, c/w  Plaquenil  - will wean off oxygen as tolerated  - Supportive measures: Tylenol PRN fever/pain, avoid NSAIDS.  - If continued diarrhea- can consider stool studies, further imaging.  Albuterol HFA ATC, mucinex BID, azithromycin 500mg q24  Can't lie prone due to body habitus therefore patient to lie on sides every few hours instead of flat on back  Continue incentive spirometry

## 2020-03-25 LAB
ALBUMIN SERPL ELPH-MCNC: 2.8 G/DL — LOW (ref 3.3–5)
ALP SERPL-CCNC: 149 U/L — HIGH (ref 40–120)
ALT FLD-CCNC: 51 U/L — HIGH (ref 4–33)
ANION GAP SERPL CALC-SCNC: 11 MMO/L — SIGNIFICANT CHANGE UP (ref 7–14)
AST SERPL-CCNC: 103 U/L — HIGH (ref 4–32)
BASOPHILS # BLD AUTO: 0.01 K/UL — SIGNIFICANT CHANGE UP (ref 0–0.2)
BASOPHILS NFR BLD AUTO: 0.2 % — SIGNIFICANT CHANGE UP (ref 0–2)
BASOPHILS NFR SPEC: 0 % — SIGNIFICANT CHANGE UP (ref 0–2)
BILIRUB SERPL-MCNC: 0.4 MG/DL — SIGNIFICANT CHANGE UP (ref 0.2–1.2)
BLASTS # FLD: 0 % — SIGNIFICANT CHANGE UP (ref 0–0)
BUN SERPL-MCNC: 5 MG/DL — LOW (ref 7–23)
CALCIUM SERPL-MCNC: 8.4 MG/DL — SIGNIFICANT CHANGE UP (ref 8.4–10.5)
CHLORIDE SERPL-SCNC: 101 MMOL/L — SIGNIFICANT CHANGE UP (ref 98–107)
CO2 SERPL-SCNC: 27 MMOL/L — SIGNIFICANT CHANGE UP (ref 22–31)
CREAT SERPL-MCNC: 0.44 MG/DL — LOW (ref 0.5–1.3)
CRP SERPL-MCNC: 122.6 MG/L — HIGH
CULTURE RESULTS: SIGNIFICANT CHANGE UP
CULTURE RESULTS: SIGNIFICANT CHANGE UP
EOSINOPHIL # BLD AUTO: 0 K/UL — SIGNIFICANT CHANGE UP (ref 0–0.5)
EOSINOPHIL NFR BLD AUTO: 0 % — SIGNIFICANT CHANGE UP (ref 0–6)
EOSINOPHIL NFR FLD: 0 % — SIGNIFICANT CHANGE UP (ref 0–6)
GIANT PLATELETS BLD QL SMEAR: PRESENT — SIGNIFICANT CHANGE UP
GLUCOSE SERPL-MCNC: 111 MG/DL — HIGH (ref 70–99)
HCT VFR BLD CALC: 35.4 % — SIGNIFICANT CHANGE UP (ref 34.5–45)
HGB BLD-MCNC: 11 G/DL — LOW (ref 11.5–15.5)
IMM GRANULOCYTES NFR BLD AUTO: 1.1 % — SIGNIFICANT CHANGE UP (ref 0–1.5)
LYMPHOCYTES # BLD AUTO: 1.2 K/UL — SIGNIFICANT CHANGE UP (ref 1–3.3)
LYMPHOCYTES # BLD AUTO: 22.7 % — SIGNIFICANT CHANGE UP (ref 13–44)
LYMPHOCYTES NFR SPEC AUTO: 18.3 % — SIGNIFICANT CHANGE UP (ref 13–44)
MAGNESIUM SERPL-MCNC: 1.9 MG/DL — SIGNIFICANT CHANGE UP (ref 1.6–2.6)
MCHC RBC-ENTMCNC: 27.2 PG — SIGNIFICANT CHANGE UP (ref 27–34)
MCHC RBC-ENTMCNC: 31.1 % — LOW (ref 32–36)
MCV RBC AUTO: 87.6 FL — SIGNIFICANT CHANGE UP (ref 80–100)
METAMYELOCYTES # FLD: 0 % — SIGNIFICANT CHANGE UP (ref 0–1)
MONOCYTES # BLD AUTO: 0.32 K/UL — SIGNIFICANT CHANGE UP (ref 0–0.9)
MONOCYTES NFR BLD AUTO: 6 % — SIGNIFICANT CHANGE UP (ref 2–14)
MONOCYTES NFR BLD: 3.3 % — SIGNIFICANT CHANGE UP (ref 2–9)
MYELOCYTES NFR BLD: 0 % — SIGNIFICANT CHANGE UP (ref 0–0)
NEUTROPHIL AB SER-ACNC: 76.7 % — SIGNIFICANT CHANGE UP (ref 43–77)
NEUTROPHILS # BLD AUTO: 3.7 K/UL — SIGNIFICANT CHANGE UP (ref 1.8–7.4)
NEUTROPHILS NFR BLD AUTO: 70 % — SIGNIFICANT CHANGE UP (ref 43–77)
NEUTS BAND # BLD: 0 % — SIGNIFICANT CHANGE UP (ref 0–6)
NRBC # FLD: 0 K/UL — SIGNIFICANT CHANGE UP (ref 0–0)
OTHER - HEMATOLOGY %: 0 — SIGNIFICANT CHANGE UP
PHOSPHATE SERPL-MCNC: 3.1 MG/DL — SIGNIFICANT CHANGE UP (ref 2.5–4.5)
PLATELET # BLD AUTO: 230 K/UL — SIGNIFICANT CHANGE UP (ref 150–400)
PLATELET COUNT - ESTIMATE: NORMAL — SIGNIFICANT CHANGE UP
PMV BLD: 9.7 FL — SIGNIFICANT CHANGE UP (ref 7–13)
POTASSIUM SERPL-MCNC: 3.2 MMOL/L — LOW (ref 3.5–5.3)
POTASSIUM SERPL-SCNC: 3.2 MMOL/L — LOW (ref 3.5–5.3)
PROMYELOCYTES # FLD: 0 % — SIGNIFICANT CHANGE UP (ref 0–0)
PROT SERPL-MCNC: 6.6 G/DL — SIGNIFICANT CHANGE UP (ref 6–8.3)
RBC # BLD: 4.04 M/UL — SIGNIFICANT CHANGE UP (ref 3.8–5.2)
RBC # FLD: 14.5 % — SIGNIFICANT CHANGE UP (ref 10.3–14.5)
SODIUM SERPL-SCNC: 139 MMOL/L — SIGNIFICANT CHANGE UP (ref 135–145)
SPECIMEN SOURCE: SIGNIFICANT CHANGE UP
SPECIMEN SOURCE: SIGNIFICANT CHANGE UP
VARIANT LYMPHS # BLD: 1.7 % — SIGNIFICANT CHANGE UP
WBC # BLD: 5.29 K/UL — SIGNIFICANT CHANGE UP (ref 3.8–10.5)
WBC # FLD AUTO: 5.29 K/UL — SIGNIFICANT CHANGE UP (ref 3.8–10.5)

## 2020-03-25 PROCEDURE — 99232 SBSQ HOSP IP/OBS MODERATE 35: CPT

## 2020-03-25 RX ORDER — POTASSIUM CHLORIDE 20 MEQ
40 PACKET (EA) ORAL EVERY 4 HOURS
Refills: 0 | Status: COMPLETED | OUTPATIENT
Start: 2020-03-25 | End: 2020-03-25

## 2020-03-25 RX ADMIN — Medication 200 MILLIGRAM(S): at 13:50

## 2020-03-25 RX ADMIN — Medication 650 MILLIGRAM(S): at 03:00

## 2020-03-25 RX ADMIN — Medication 103 MILLIGRAM(S): at 13:50

## 2020-03-25 RX ADMIN — ALBUTEROL 2 PUFF(S): 90 AEROSOL, METERED ORAL at 13:49

## 2020-03-25 RX ADMIN — Medication 650 MILLIGRAM(S): at 09:36

## 2020-03-25 RX ADMIN — ALBUTEROL 2 PUFF(S): 90 AEROSOL, METERED ORAL at 18:26

## 2020-03-25 RX ADMIN — ALBUTEROL 2 PUFF(S): 90 AEROSOL, METERED ORAL at 07:14

## 2020-03-25 RX ADMIN — Medication 650 MILLIGRAM(S): at 02:23

## 2020-03-25 RX ADMIN — HEPARIN SODIUM 5000 UNIT(S): 5000 INJECTION INTRAVENOUS; SUBCUTANEOUS at 00:11

## 2020-03-25 RX ADMIN — Medication 103 MILLIGRAM(S): at 02:25

## 2020-03-25 RX ADMIN — Medication 40 MILLIEQUIVALENT(S): at 13:50

## 2020-03-25 RX ADMIN — HEPARIN SODIUM 5000 UNIT(S): 5000 INJECTION INTRAVENOUS; SUBCUTANEOUS at 09:36

## 2020-03-25 RX ADMIN — ALBUTEROL 2 PUFF(S): 90 AEROSOL, METERED ORAL at 00:10

## 2020-03-25 RX ADMIN — HEPARIN SODIUM 5000 UNIT(S): 5000 INJECTION INTRAVENOUS; SUBCUTANEOUS at 18:26

## 2020-03-25 RX ADMIN — Medication 200 MILLIGRAM(S): at 02:23

## 2020-03-25 RX ADMIN — AZITHROMYCIN 500 MILLIGRAM(S): 500 TABLET, FILM COATED ORAL at 13:49

## 2020-03-25 RX ADMIN — Medication 600 MILLIGRAM(S): at 08:11

## 2020-03-25 RX ADMIN — Medication 650 MILLIGRAM(S): at 10:05

## 2020-03-25 RX ADMIN — Medication 40 MILLIEQUIVALENT(S): at 18:25

## 2020-03-25 RX ADMIN — Medication 600 MILLIGRAM(S): at 18:25

## 2020-03-25 RX ADMIN — Medication 103 MILLIGRAM(S): at 09:37

## 2020-03-25 NOTE — PROGRESS NOTE ADULT - PROBLEM SELECTOR PLAN 1
+ COVID 19  3/20 Bcx NGTD, RVP neg, UA neg  - Pt on RA but Sat 95% ( was 100% initially), will wean off today and likely dc in the AM is stable off oxygen. Will check Pulse ox with exertion.   LFTs uptrending, c/w  Plaquenil  - will wean off oxygen as tolerated  - Supportive measures: Tylenol PRN fever/pain, avoid NSAIDS.  Albuterol HFA ATC, mucinex BID, azithromycin 500mg q24  Can't lie prone due to body habitus therefore patient to lie on sides every few hours instead of flat on back  Continue incentive spirometry Bilobed Transposition Flap Text: The defect edges were debeveled with a #15 scalpel blade.  Given the location of the defect and the proximity to free margins a bilobed transposition flap was deemed most appropriate.  Using a sterile surgical marker, an appropriate bilobe flap drawn around the defect.    The area thus outlined was incised deep to adipose tissue with a #15 scalpel blade.  The skin margins were undermined to an appropriate distance in all directions utilizing iris scissors.

## 2020-03-25 NOTE — PROVIDER CONTACT NOTE (OTHER) - ACTION/TREATMENT ORDERED:
1G IV tylenol to be ordered and administered x 1 dose. Continue to monitor temp.
PA notified. States for no intervention at this time. Endorsed to ZANA Kinsey. Will continue to monitor.
Provider aware.
keep on o2 then ween off
pt went to ct scan, will give tylenol when patient returns, pt unable to wean off oxygen pt on 2L NC
put patient on 2L NC and reassess oxygen in 1 hour in flowsheet

## 2020-03-25 NOTE — PROGRESS NOTE ADULT - SUBJECTIVE AND OBJECTIVE BOX
[facility] of [facility] Medicine  Ricky Helton MD  Pager [phone]      Patient is a 65y old  Female who presents with a chief complaint of Fever/Cough/SOB- RULE OUT COVID 19. (24 Mar 2020 14:33)      SUBJECTIVE / OVERNIGHT EVENTS: Improved SOB and cough    MEDICATIONS  (STANDING):  ALBUTerol    90 MICROgram(s) HFA Inhaler 2 Puff(s) Inhalation every 6 hours  azithromycin   Tablet 500 milliGRAM(s) Oral daily  dextrose 5%. 1000 milliLiter(s) (50 mL/Hr) IV Continuous <Continuous>  dextrose 50% Injectable 12.5 Gram(s) IV Push once  dextrose 50% Injectable 25 Gram(s) IV Push once  dextrose 50% Injectable 25 Gram(s) IV Push once  guaiFENesin  milliGRAM(s) Oral every 12 hours  heparin  Injectable 5000 Unit(s) SubCutaneous every 8 hours  hydroxychloroquine 200 milliGRAM(s) Oral every 12 hours  insulin lispro (HumaLOG) corrective regimen sliding scale   SubCutaneous Before meals and at bedtime  potassium chloride    Tablet ER 40 milliEquivalent(s) Oral every 4 hours    MEDICATIONS  (PRN):  acetaminophen   Tablet .. 650 milliGRAM(s) Oral every 6 hours PRN Temp greater or equal to 38C (100.4F), Mild Pain (1 - 3)  dextrose 40% Gel 15 Gram(s) Oral once PRN Blood Glucose LESS THAN 70 milliGRAM(s)/deciliter  glucagon  Injectable 1 milliGRAM(s) IntraMuscular once PRN Glucose LESS THAN 70 milligrams/deciliter      CAPILLARY BLOOD GLUCOSE      POCT Blood Glucose.: 103 mg/dL (25 Mar 2020 12:34)  POCT Blood Glucose.: 103 mg/dL (25 Mar 2020 08:34)  POCT Blood Glucose.: 119 mg/dL (24 Mar 2020 23:34)  POCT Blood Glucose.: 112 mg/dL (24 Mar 2020 18:22)    I&O's Summary    24 Mar 2020 07:01  -  25 Mar 2020 07:00  --------------------------------------------------------  IN: 0 mL / OUT: 450 mL / NET: -450 mL        PHYSICAL EXAM:  Vital Signs Last 24 Hrs  T(C): 36.9 (25 Mar 2020 11:15), Max: 39.4 (24 Mar 2020 16:45)  T(F): 98.5 (25 Mar 2020 11:15), Max: 102.9 (24 Mar 2020 16:45)  HR: 86 (25 Mar 2020 11:15) (82 - 103)  BP: 134/66 (25 Mar 2020 11:15) (128/70 - 153/72)  BP(mean): --  RR: 20 (25 Mar 2020 13:51) (18 - 24)  SpO2: 95% (25 Mar 2020 13:51) (95% - 100%)    CONSTITUTIONAL: NAD  EYES: conjunctiva and sclera clear  ENMT: mmm  NECK: Supple,  RESPIRATORY: bibasilar crackles  CARDIOVASCULAR: Regular rate and rhythm, + S1 and S2  ABDOMEN: Nontender to palpation, normoactive bowel sounds, no rebound/guarding  MUSCULOSKELETAL:  no clubbing or cyanosis of digits;   PSYCH: A+O x 3    LABS:                        11.0   5.29  )-----------( 230      ( 25 Mar 2020 05:40 )             35.4     03-25    139  |  101  |  5<L>  ----------------------------<  111<H>  3.2<L>   |  27  |  0.44<L>    Ca    8.4      25 Mar 2020 05:40  Phos  3.1     03-25  Mg     1.9     03-25    TPro  6.6  /  Alb  2.8<L>  /  TBili  0.4  /  DBili  x   /  AST  103<H>  /  ALT  51<H>  /  AlkPhos  149<H>  03-25

## 2020-03-26 DIAGNOSIS — J96.01 ACUTE RESPIRATORY FAILURE WITH HYPOXIA: ICD-10-CM

## 2020-03-26 PROCEDURE — 99232 SBSQ HOSP IP/OBS MODERATE 35: CPT

## 2020-03-26 RX ADMIN — AZITHROMYCIN 500 MILLIGRAM(S): 500 TABLET, FILM COATED ORAL at 13:21

## 2020-03-26 RX ADMIN — HEPARIN SODIUM 5000 UNIT(S): 5000 INJECTION INTRAVENOUS; SUBCUTANEOUS at 01:51

## 2020-03-26 RX ADMIN — Medication 650 MILLIGRAM(S): at 01:53

## 2020-03-26 RX ADMIN — Medication 200 MILLIGRAM(S): at 13:21

## 2020-03-26 RX ADMIN — Medication 200 MILLIGRAM(S): at 01:51

## 2020-03-26 RX ADMIN — HEPARIN SODIUM 5000 UNIT(S): 5000 INJECTION INTRAVENOUS; SUBCUTANEOUS at 18:15

## 2020-03-26 RX ADMIN — ALBUTEROL 2 PUFF(S): 90 AEROSOL, METERED ORAL at 12:00

## 2020-03-26 RX ADMIN — Medication 650 MILLIGRAM(S): at 02:53

## 2020-03-26 RX ADMIN — Medication 600 MILLIGRAM(S): at 06:38

## 2020-03-26 RX ADMIN — ALBUTEROL 2 PUFF(S): 90 AEROSOL, METERED ORAL at 18:16

## 2020-03-26 RX ADMIN — Medication 650 MILLIGRAM(S): at 22:15

## 2020-03-26 RX ADMIN — Medication 600 MILLIGRAM(S): at 18:31

## 2020-03-26 RX ADMIN — HEPARIN SODIUM 5000 UNIT(S): 5000 INJECTION INTRAVENOUS; SUBCUTANEOUS at 08:31

## 2020-03-26 RX ADMIN — Medication 650 MILLIGRAM(S): at 21:43

## 2020-03-26 NOTE — PROGRESS NOTE ADULT - PROBLEM SELECTOR PLAN 1
- due to viral PNA in the setting of COVID-19  - Pt Sat 95% on RA at rest, 83% on RA on ambulation, Sat improved to 94% on 2L with exertion. Pt will need home oxygen  - CM notified, will f/u recs

## 2020-03-26 NOTE — PROGRESS NOTE ADULT - SUBJECTIVE AND OBJECTIVE BOX
[facility] Medicine  Ricky Helton MD  Pager [phone]      Patient is a 65y old  Female who presents with a chief complaint of Fever/Cough/SOB- RULE OUT COVID 19. (25 Mar 2020 14:05)      SUBJECTIVE / OVERNIGHT EVENTS: Improved SOB. No CP    MEDICATIONS  (STANDING):  ALBUTerol    90 MICROgram(s) HFA Inhaler 2 Puff(s) Inhalation every 6 hours  dextrose 5%. 1000 milliLiter(s) (50 mL/Hr) IV Continuous <Continuous>  dextrose 50% Injectable 12.5 Gram(s) IV Push once  dextrose 50% Injectable 25 Gram(s) IV Push once  dextrose 50% Injectable 25 Gram(s) IV Push once  guaiFENesin  milliGRAM(s) Oral every 12 hours  heparin  Injectable 5000 Unit(s) SubCutaneous every 8 hours  hydroxychloroquine 200 milliGRAM(s) Oral every 12 hours  insulin lispro (HumaLOG) corrective regimen sliding scale   SubCutaneous Before meals and at bedtime    MEDICATIONS  (PRN):  acetaminophen   Tablet .. 650 milliGRAM(s) Oral every 6 hours PRN Temp greater or equal to 38C (100.4F), Mild Pain (1 - 3)  dextrose 40% Gel 15 Gram(s) Oral once PRN Blood Glucose LESS THAN 70 milliGRAM(s)/deciliter  glucagon  Injectable 1 milliGRAM(s) IntraMuscular once PRN Glucose LESS THAN 70 milligrams/deciliter      CAPILLARY BLOOD GLUCOSE      POCT Blood Glucose.: 82 mg/dL (26 Mar 2020 12:41)  POCT Blood Glucose.: 89 mg/dL (25 Mar 2020 21:45)  POCT Blood Glucose.: 88 mg/dL (25 Mar 2020 16:20)    I&O's Summary    25 Mar 2020 07:01  -  26 Mar 2020 07:00  --------------------------------------------------------  IN: 100 mL / OUT: 100 mL / NET: 0 mL        PHYSICAL EXAM:  Vital Signs Last 24 Hrs  T(C): 36.5 (26 Mar 2020 13:01), Max: 37.3 (25 Mar 2020 13:51)  T(F): 97.7 (26 Mar 2020 13:01), Max: 99.1 (25 Mar 2020 13:51)  HR: 86 (26 Mar 2020 13:01) (86 - 95)  BP: 151/69 (26 Mar 2020 13:01) (129/66 - 151/69)  BP(mean): --  RR: 20 (26 Mar 2020 13:01) (20 - 24)  SpO2: 99% (26 Mar 2020 13:01) (83% - 100%)    CONSTITUTIONAL: NAD, obese  EYES: conjunctiva and sclera clear  ENMT: mmm  NECK: Supple,  CARDIOVASCULAR: Regular rate and rhythm, + S1 and S2  ABDOMEN: Nontender to palpation, normoactive bowel sounds, no rebound/guarding  MUSCULOSKELETAL:  no clubbing or cyanosis of digits;   PSYCH: A+O x 3      LABS:                        11.0   5.29  )-----------( 230      ( 25 Mar 2020 05:40 )             35.4     03-25    139  |  101  |  5<L>  ----------------------------<  111<H>  3.2<L>   |  27  |  0.44<L>    Ca    8.4      25 Mar 2020 05:40  Phos  3.1     03-25  Mg     1.9     03-25    TPro  6.6  /  Alb  2.8<L>  /  TBili  0.4  /  DBili  x   /  AST  103<H>  /  ALT  51<H>  /  AlkPhos  149<H>  03-25

## 2020-03-26 NOTE — PROGRESS NOTE ADULT - PROBLEM SELECTOR PLAN 2
+ COVID 19 LFTs uptrending (plateau), c/w  Plaquenil ( will not dc on plaquenil)  - Supportive measures: Tylenol PRN fever/pain, avoid NSAIDS.  Albuterol HFA ATC, mucinex BID, s/p azithromycin  Continue incentive spirometry

## 2020-03-27 DIAGNOSIS — R74.0 NONSPECIFIC ELEVATION OF LEVELS OF TRANSAMINASE AND LACTIC ACID DEHYDROGENASE [LDH]: ICD-10-CM

## 2020-03-27 LAB
ALBUMIN SERPL ELPH-MCNC: 2.9 G/DL — LOW (ref 3.3–5)
ALP SERPL-CCNC: 176 U/L — HIGH (ref 40–120)
ALT FLD-CCNC: 64 U/L — HIGH (ref 4–33)
ANION GAP SERPL CALC-SCNC: 12 MMO/L — SIGNIFICANT CHANGE UP (ref 7–14)
AST SERPL-CCNC: 109 U/L — HIGH (ref 4–32)
BILIRUB SERPL-MCNC: 0.6 MG/DL — SIGNIFICANT CHANGE UP (ref 0.2–1.2)
BUN SERPL-MCNC: 6 MG/DL — LOW (ref 7–23)
CALCIUM SERPL-MCNC: 8.8 MG/DL — SIGNIFICANT CHANGE UP (ref 8.4–10.5)
CHLORIDE SERPL-SCNC: 100 MMOL/L — SIGNIFICANT CHANGE UP (ref 98–107)
CO2 SERPL-SCNC: 28 MMOL/L — SIGNIFICANT CHANGE UP (ref 22–31)
CREAT SERPL-MCNC: 0.41 MG/DL — LOW (ref 0.5–1.3)
GLUCOSE SERPL-MCNC: 94 MG/DL — SIGNIFICANT CHANGE UP (ref 70–99)
HCT VFR BLD CALC: 32.8 % — LOW (ref 34.5–45)
HGB BLD-MCNC: 10.5 G/DL — LOW (ref 11.5–15.5)
MAGNESIUM SERPL-MCNC: 1.9 MG/DL — SIGNIFICANT CHANGE UP (ref 1.6–2.6)
MCHC RBC-ENTMCNC: 27.8 PG — SIGNIFICANT CHANGE UP (ref 27–34)
MCHC RBC-ENTMCNC: 32 % — SIGNIFICANT CHANGE UP (ref 32–36)
MCV RBC AUTO: 86.8 FL — SIGNIFICANT CHANGE UP (ref 80–100)
NRBC # FLD: 0 K/UL — SIGNIFICANT CHANGE UP (ref 0–0)
PHOSPHATE SERPL-MCNC: 3 MG/DL — SIGNIFICANT CHANGE UP (ref 2.5–4.5)
PLATELET # BLD AUTO: 310 K/UL — SIGNIFICANT CHANGE UP (ref 150–400)
PMV BLD: 9.8 FL — SIGNIFICANT CHANGE UP (ref 7–13)
POTASSIUM SERPL-MCNC: 3.5 MMOL/L — SIGNIFICANT CHANGE UP (ref 3.5–5.3)
POTASSIUM SERPL-SCNC: 3.5 MMOL/L — SIGNIFICANT CHANGE UP (ref 3.5–5.3)
PROT SERPL-MCNC: 7.1 G/DL — SIGNIFICANT CHANGE UP (ref 6–8.3)
RBC # BLD: 3.78 M/UL — LOW (ref 3.8–5.2)
RBC # FLD: 14.3 % — SIGNIFICANT CHANGE UP (ref 10.3–14.5)
SODIUM SERPL-SCNC: 140 MMOL/L — SIGNIFICANT CHANGE UP (ref 135–145)
WBC # BLD: 6.05 K/UL — SIGNIFICANT CHANGE UP (ref 3.8–10.5)
WBC # FLD AUTO: 6.05 K/UL — SIGNIFICANT CHANGE UP (ref 3.8–10.5)

## 2020-03-27 PROCEDURE — 99232 SBSQ HOSP IP/OBS MODERATE 35: CPT

## 2020-03-27 RX ORDER — POTASSIUM CHLORIDE 20 MEQ
40 PACKET (EA) ORAL ONCE
Refills: 0 | Status: COMPLETED | OUTPATIENT
Start: 2020-03-27 | End: 2020-03-27

## 2020-03-27 RX ADMIN — HEPARIN SODIUM 5000 UNIT(S): 5000 INJECTION INTRAVENOUS; SUBCUTANEOUS at 09:49

## 2020-03-27 RX ADMIN — Medication 600 MILLIGRAM(S): at 06:57

## 2020-03-27 RX ADMIN — Medication 650 MILLIGRAM(S): at 22:22

## 2020-03-27 RX ADMIN — Medication 600 MILLIGRAM(S): at 17:12

## 2020-03-27 RX ADMIN — HEPARIN SODIUM 5000 UNIT(S): 5000 INJECTION INTRAVENOUS; SUBCUTANEOUS at 01:54

## 2020-03-27 RX ADMIN — Medication 200 MILLIGRAM(S): at 01:53

## 2020-03-27 RX ADMIN — HEPARIN SODIUM 5000 UNIT(S): 5000 INJECTION INTRAVENOUS; SUBCUTANEOUS at 17:12

## 2020-03-27 RX ADMIN — Medication 40 MILLIEQUIVALENT(S): at 17:12

## 2020-03-27 RX ADMIN — Medication 200 MILLIGRAM(S): at 13:19

## 2020-03-27 RX ADMIN — ALBUTEROL 2 PUFF(S): 90 AEROSOL, METERED ORAL at 01:55

## 2020-03-27 RX ADMIN — ALBUTEROL 2 PUFF(S): 90 AEROSOL, METERED ORAL at 23:50

## 2020-03-27 RX ADMIN — ALBUTEROL 2 PUFF(S): 90 AEROSOL, METERED ORAL at 17:13

## 2020-03-27 RX ADMIN — ALBUTEROL 2 PUFF(S): 90 AEROSOL, METERED ORAL at 13:19

## 2020-03-27 RX ADMIN — Medication 650 MILLIGRAM(S): at 21:22

## 2020-03-27 RX ADMIN — HEPARIN SODIUM 5000 UNIT(S): 5000 INJECTION INTRAVENOUS; SUBCUTANEOUS at 23:51

## 2020-03-27 NOTE — DIETITIAN INITIAL EVALUATION ADULT. - PERTINENT LABORATORY DATA
03-27 Na140 mmol/L Glu 94 mg/dL K+ 3.5 mmol/L Cr  0.41 mg/dL<L> BUN 6 mg/dL<L> 03-27 Phos 3.0 mg/dL 03-27 Alb 2.9 g/dL<L> 03-21 [id number] 7.1 %<H>    CAPILLARY BLOOD GLUCOSE  POCT Blood Glucose.: 99 mg/dL (27 Mar 2020 12:43)  POCT Blood Glucose.: 99 mg/dL (27 Mar 2020 09:24)  POCT Blood Glucose.: 97 mg/dL (26 Mar 2020 21:40)  POCT Blood Glucose.: 88 mg/dL (26 Mar 2020 17:42)

## 2020-03-27 NOTE — PROGRESS NOTE ADULT - SUBJECTIVE AND OBJECTIVE BOX
[facility]  Ricky Helton MD  Pager [phone]      Patient is a 65y old  Female who presents with a chief complaint of Fever/Cough/SOB- RULE OUT COVID 19. (26 Mar 2020 13:22)      SUBJECTIVE / OVERNIGHT EVENTS:    MEDICATIONS  (STANDING):  ALBUTerol    90 MICROgram(s) HFA Inhaler 2 Puff(s) Inhalation every 6 hours  dextrose 5%. 1000 milliLiter(s) (50 mL/Hr) IV Continuous <Continuous>  dextrose 50% Injectable 12.5 Gram(s) IV Push once  dextrose 50% Injectable 25 Gram(s) IV Push once  dextrose 50% Injectable 25 Gram(s) IV Push once  guaiFENesin  milliGRAM(s) Oral every 12 hours  heparin  Injectable 5000 Unit(s) SubCutaneous every 8 hours  hydroxychloroquine 200 milliGRAM(s) Oral every 12 hours  insulin lispro (HumaLOG) corrective regimen sliding scale   SubCutaneous Before meals and at bedtime  potassium chloride    Tablet ER 40 milliEquivalent(s) Oral once    MEDICATIONS  (PRN):  acetaminophen   Tablet .. 650 milliGRAM(s) Oral every 6 hours PRN Temp greater or equal to 38C (100.4F), Mild Pain (1 - 3)  dextrose 40% Gel 15 Gram(s) Oral once PRN Blood Glucose LESS THAN 70 milliGRAM(s)/deciliter  glucagon  Injectable 1 milliGRAM(s) IntraMuscular once PRN Glucose LESS THAN 70 milligrams/deciliter      CAPILLARY BLOOD GLUCOSE      POCT Blood Glucose.: 99 mg/dL (27 Mar 2020 12:43)  POCT Blood Glucose.: 99 mg/dL (27 Mar 2020 09:24)  POCT Blood Glucose.: 97 mg/dL (26 Mar 2020 21:40)  POCT Blood Glucose.: 88 mg/dL (26 Mar 2020 17:42)    I&O's Summary      PHYSICAL EXAM:  Vital Signs Last 24 Hrs  T(C): 36.6 (27 Mar 2020 09:59), Max: 36.9 (26 Mar 2020 21:32)  T(F): 97.8 (27 Mar 2020 09:59), Max: 98.4 (26 Mar 2020 21:32)  HR: 83 (27 Mar 2020 09:59) (83 - 93)  BP: 149/61 (27 Mar 2020 09:59) (131/60 - 154/84)  BP(mean): --  RR: 19 (27 Mar 2020 09:59) (19 - 20)  SpO2: 98% (27 Mar 2020 09:59) (94% - 98%)    CONSTITUTIONAL: NAD, obese  EYES: conjunctiva and sclera clear  ENMT: mmm  NECK: Supple,  RESPIRATORY: bibasilar crackles  CARDIOVASCULAR: Regular rate and rhythm, + S1 and S2  ABDOMEN: Nontender to palpation, normoactive bowel sounds, no rebound/guarding  MUSCULOSKELETAL:  no clubbing or cyanosis of digits;   PSYCH: A+O x 2-3  NEUROLOGY: no gross deficits   SKIN: No rashes;     LABS:                        10.5   6.05  )-----------( 310      ( 27 Mar 2020 05:35 )             32.8     03-27    140  |  100  |  6<L>  ----------------------------<  94  3.5   |  28  |  0.41<L>    Ca    8.8      27 Mar 2020 05:35  Phos  3.0     03-27  Mg     1.9     03-27    TPro  7.1  /  Alb  2.9<L>  /  TBili  0.6  /  DBili  x   /  AST  109<H>  /  ALT  64<H>  /  AlkPhos  176<H>  03-27

## 2020-03-27 NOTE — PROGRESS NOTE ADULT - ATTENDING COMMENTS
Hypokalemia: replete and trend K
Plan for dc if Oxygen can be setup  Pt to follow up with PCP for LFTs trending upon dc  Spent 45mins on dc
Plan for dc with f/u PCP outpt  LFT trending outpt  Dc pending home oxygen setup

## 2020-03-27 NOTE — DIETITIAN INITIAL EVALUATION ADULT. - OTHER INFO
Nutrition assessment due for length of stay. RD unable to have face to face encounter with patient to perform nutrition interview and/or nutrition-focused physical exam due to contact/isolation precautions related to COVID-19.     No reported nutritional issues. Nutrition assessment due for length of stay. RD unable to have face to face encounter with patient to perform nutrition interview and/or nutrition-focused physical exam due to contact/isolation precautions related to COVID-19.     No reported nutritional issues; RD attempted to contact RN, however, unable to come to phone at this time.  Patient without noted telephone contacts, no weight or height noted at this time.  Per [facility] HIE 11/11/19 - 133.8kg / 175.3 cm, 11/12/18 - 136.1kg / 170.2cm.  Discrepancy in heights noted.   Noted with generalized edema; perviously 2-3+ edema noted per nursing flowsheets.

## 2020-03-27 NOTE — DIETITIAN INITIAL EVALUATION ADULT. - PROBLEM SELECTOR PLAN 1
- Associated with Fever, "Chest tightness." Diarrhea Sunday- >Monday.  - RULE OUT SARS associated coronavirus infection.  - No leukocytosis, lymphopenia, leukopenia. Troponin negative x2.   - Chest X-ray negative, negative rapid viral panel.   - Follow up COVID 19- collected, in lab.   - Follow up blood cultures, urine cultures.   - Given age >65, comorbidities- to d/w case with ID.   - Per ID, only start Plaquenil if patient develops hypoxia, worsening resp status.   - Monitor vitals closely. Fever curve.   - Oxygen supplementation via NC as needed, monitor respiratory status.   - If has increasing O2 requirements, use Nonrebreather, monitor closely.   - Avoid BIPAP, High Flow, or Duonebulizers given risk of aerosolization.  - Supportive measures: Tylenol PRN fever/pain, avoid NSAIDS.  - If continued diarrhea- can consider stool studies, further imaging.

## 2020-03-27 NOTE — DIETITIAN INITIAL EVALUATION ADULT. - PROBLEM SELECTOR PLAN 3
- Follow up HbA1c.   - Not on Insulin, reports taking Metformin- hold while inpatient.   - Fingerstick monitoring, sliding scale coverage as needed.

## 2020-03-27 NOTE — DIETITIAN INITIAL EVALUATION ADULT. - ADD RECOMMEND
1) Obtain weight, height as feasible.  2) Monitor weight PO intake/diet tolerance, skin integrity, pertinent labs.

## 2020-03-27 NOTE — DIETITIAN INITIAL EVALUATION ADULT. - PERTINENT MEDS FT
MEDICATIONS  (STANDING):  ALBUTerol    90 MICROgram(s) HFA Inhaler 2 Puff(s) Inhalation every 6 hours  dextrose 5%. 1000 milliLiter(s) (50 mL/Hr) IV Continuous <Continuous>  dextrose 50% Injectable 12.5 Gram(s) IV Push once  dextrose 50% Injectable 25 Gram(s) IV Push once  dextrose 50% Injectable 25 Gram(s) IV Push once  guaiFENesin  milliGRAM(s) Oral every 12 hours  heparin  Injectable 5000 Unit(s) SubCutaneous every 8 hours  hydroxychloroquine 200 milliGRAM(s) Oral every 12 hours  insulin lispro (HumaLOG) corrective regimen sliding scale   SubCutaneous Before meals and at bedtime  potassium chloride    Tablet ER 40 milliEquivalent(s) Oral once    MEDICATIONS  (PRN):  acetaminophen   Tablet .. 650 milliGRAM(s) Oral every 6 hours PRN Temp greater or equal to 38C (100.4F), Mild Pain (1 - 3)  dextrose 40% Gel 15 Gram(s) Oral once PRN Blood Glucose LESS THAN 70 milliGRAM(s)/deciliter  glucagon  Injectable 1 milliGRAM(s) IntraMuscular once PRN Glucose LESS THAN 70 milligrams/deciliter

## 2020-03-28 PROCEDURE — 99233 SBSQ HOSP IP/OBS HIGH 50: CPT

## 2020-03-28 RX ORDER — LOSARTAN POTASSIUM 100 MG/1
100 TABLET, FILM COATED ORAL DAILY
Refills: 0 | Status: DISCONTINUED | OUTPATIENT
Start: 2020-03-28 | End: 2020-03-29

## 2020-03-28 RX ORDER — HYDROXYCHLOROQUINE SULFATE 200 MG
TABLET ORAL
Refills: 0 | Status: DISCONTINUED | OUTPATIENT
Start: 2020-03-28 | End: 2020-03-28

## 2020-03-28 RX ADMIN — LOSARTAN POTASSIUM 100 MILLIGRAM(S): 100 TABLET, FILM COATED ORAL at 17:49

## 2020-03-28 RX ADMIN — HEPARIN SODIUM 5000 UNIT(S): 5000 INJECTION INTRAVENOUS; SUBCUTANEOUS at 17:00

## 2020-03-28 RX ADMIN — ALBUTEROL 2 PUFF(S): 90 AEROSOL, METERED ORAL at 06:53

## 2020-03-28 RX ADMIN — HEPARIN SODIUM 5000 UNIT(S): 5000 INJECTION INTRAVENOUS; SUBCUTANEOUS at 07:01

## 2020-03-28 RX ADMIN — Medication 200 MILLIGRAM(S): at 01:22

## 2020-03-28 RX ADMIN — ALBUTEROL 2 PUFF(S): 90 AEROSOL, METERED ORAL at 17:38

## 2020-03-28 RX ADMIN — ALBUTEROL 2 PUFF(S): 90 AEROSOL, METERED ORAL at 11:13

## 2020-03-28 RX ADMIN — Medication 600 MILLIGRAM(S): at 06:53

## 2020-03-28 RX ADMIN — Medication 600 MILLIGRAM(S): at 17:38

## 2020-03-28 NOTE — PROGRESS NOTE ADULT - SUBJECTIVE AND OBJECTIVE BOX
Patient is a 65y old  Female who presents with a chief complaint of Fever/Cough/SOB- RULE OUT COVID 19. (27 Mar 2020 12:58)    SUBJECTIVE / OVERNIGHT EVENTS:    No events overnight. This AM, patient is without nasal cannula is hypoxic to 80s. Improves with NC. No n/v/d. No f/c.     MEDICATIONS  (STANDING):  ALBUTerol    90 MICROgram(s) HFA Inhaler 2 Puff(s) Inhalation every 6 hours  dextrose 5%. 1000 milliLiter(s) (50 mL/Hr) IV Continuous <Continuous>  dextrose 50% Injectable 12.5 Gram(s) IV Push once  dextrose 50% Injectable 25 Gram(s) IV Push once  dextrose 50% Injectable 25 Gram(s) IV Push once  guaiFENesin  milliGRAM(s) Oral every 12 hours  heparin  Injectable 5000 Unit(s) SubCutaneous every 8 hours  hydroxychloroquine   Oral   insulin lispro (HumaLOG) corrective regimen sliding scale   SubCutaneous Before meals and at bedtime    MEDICATIONS  (PRN):  acetaminophen   Tablet .. 650 milliGRAM(s) Oral every 6 hours PRN Temp greater or equal to 38C (100.4F), Mild Pain (1 - 3)  dextrose 40% Gel 15 Gram(s) Oral once PRN Blood Glucose LESS THAN 70 milliGRAM(s)/deciliter  glucagon  Injectable 1 milliGRAM(s) IntraMuscular once PRN Glucose LESS THAN 70 milligrams/deciliter      PHYSICAL EXAM:  T(C): 37 (03-28-20 @ 16:31), Max: 37.3 (03-27-20 @ 19:03)  HR: 90 (03-28-20 @ 16:31) (84 - 98)  BP: 152/78 (03-28-20 @ 16:31) (129/61 - 169/95)  RR: 21 (03-28-20 @ 16:31) (19 - 22)  SpO2: 94% (03-28-20 @ 16:31) (87% - 98%)  I&O's Summary    27 Mar 2020 07:01  -  28 Mar 2020 07:00  --------------------------------------------------------  IN: 240 mL / OUT: 0 mL / NET: 240 mL      GENERAL: Obese woman, seated in bed, uncomfortable  HEAD:  Atraumatic, Normocephalic, MMM  CHEST/LUNG: dyspneic, taking breaths after every sentence  PSYCH: AAOx3  NEUROLOGY: CN II-XII grossly intact, moving all extremities  SKIN: No rashes or lesions    LABS:  CAPILLARY BLOOD GLUCOSE      POCT Blood Glucose.: 88 mg/dL (28 Mar 2020 13:45)  POCT Blood Glucose.: 95 mg/dL (28 Mar 2020 08:58)  POCT Blood Glucose.: 95 mg/dL (27 Mar 2020 22:45)  POCT Blood Glucose.: 92 mg/dL (27 Mar 2020 17:46)                          10.5   6.05  )-----------( 310      ( 27 Mar 2020 05:35 )             32.8     03-27    140  |  100  |  6<L>  ----------------------------<  94  3.5   |  28  |  0.41<L>    Ca    8.8      27 Mar 2020 05:35  Phos  3.0     03-27  Mg     1.9     03-27    TPro  7.1  /  Alb  2.9<L>  /  TBili  0.6  /  DBili  x   /  AST  109<H>  /  ALT  64<H>  /  AlkPhos  176<H>  03-27                RADIOLOGY & ADDITIONAL TESTS:    Telemetry Personally Reviewed -     Imaging Personally Reviewed -     Imaging Reviewed -     Consultant(s) Notes Reviewed -       Care Discussed with Consultants/Other Providers -

## 2020-03-28 NOTE — PROGRESS NOTE ADULT - PROBLEM SELECTOR PLAN 2
+ COVID 19 LFTs uptrending (plateau), c/w  Plaquenil ( will not dc on plaquenil)  - Supportive measures: Tylenol PRN fever/pain, avoid NSAIDS.  Albuterol HFA ATC, mucinex BID, s/p azithromycin  Continue incentive spirometry + COVID 19 LFTs uptrending (plateau), completed course of Plaquenil  - Supportive measures: Tylenol PRN fever/pain, avoid NSAIDS.  Albuterol HFA ATC, mucinex BID, s/p azithromycin  Continue incentive spirometry

## 2020-03-28 NOTE — PROGRESS NOTE ADULT - REASON FOR ADMISSION
Fever/Cough/SOB- RULE OUT COVID 19.
Fever/Cough/SOB- confirmed COVID 19.
Fever/Cough/SOB- RULE OUT COVID 19.

## 2020-03-29 ENCOUNTER — TRANSCRIPTION ENCOUNTER (OUTPATIENT)
Age: 65
End: 2020-03-29

## 2020-03-29 VITALS
TEMPERATURE: 98 F | SYSTOLIC BLOOD PRESSURE: 147 MMHG | RESPIRATION RATE: 18 BRPM | OXYGEN SATURATION: 97 % | DIASTOLIC BLOOD PRESSURE: 70 MMHG | HEART RATE: 96 BPM

## 2020-03-29 LAB
ALBUMIN SERPL ELPH-MCNC: 3.2 G/DL — LOW (ref 3.3–5)
ALP SERPL-CCNC: 183 U/L — HIGH (ref 40–120)
ALT FLD-CCNC: 57 U/L — HIGH (ref 4–33)
ANION GAP SERPL CALC-SCNC: 14 MMO/L — SIGNIFICANT CHANGE UP (ref 7–14)
AST SERPL-CCNC: 76 U/L — HIGH (ref 4–32)
BILIRUB SERPL-MCNC: 0.7 MG/DL — SIGNIFICANT CHANGE UP (ref 0.2–1.2)
BUN SERPL-MCNC: 5 MG/DL — LOW (ref 7–23)
CALCIUM SERPL-MCNC: 9.4 MG/DL — SIGNIFICANT CHANGE UP (ref 8.4–10.5)
CHLORIDE SERPL-SCNC: 99 MMOL/L — SIGNIFICANT CHANGE UP (ref 98–107)
CO2 SERPL-SCNC: 29 MMOL/L — SIGNIFICANT CHANGE UP (ref 22–31)
CREAT SERPL-MCNC: 0.51 MG/DL — SIGNIFICANT CHANGE UP (ref 0.5–1.3)
GLUCOSE SERPL-MCNC: 112 MG/DL — HIGH (ref 70–99)
HCT VFR BLD CALC: 35.7 % — SIGNIFICANT CHANGE UP (ref 34.5–45)
HGB BLD-MCNC: 11.2 G/DL — LOW (ref 11.5–15.5)
MAGNESIUM SERPL-MCNC: 1.9 MG/DL — SIGNIFICANT CHANGE UP (ref 1.6–2.6)
MCHC RBC-ENTMCNC: 27.3 PG — SIGNIFICANT CHANGE UP (ref 27–34)
MCHC RBC-ENTMCNC: 31.4 % — LOW (ref 32–36)
MCV RBC AUTO: 86.9 FL — SIGNIFICANT CHANGE UP (ref 80–100)
NRBC # FLD: 0 K/UL — SIGNIFICANT CHANGE UP (ref 0–0)
PHOSPHATE SERPL-MCNC: 3.2 MG/DL — SIGNIFICANT CHANGE UP (ref 2.5–4.5)
PLATELET # BLD AUTO: 392 K/UL — SIGNIFICANT CHANGE UP (ref 150–400)
PMV BLD: 10.1 FL — SIGNIFICANT CHANGE UP (ref 7–13)
POTASSIUM SERPL-MCNC: 3.5 MMOL/L — SIGNIFICANT CHANGE UP (ref 3.5–5.3)
POTASSIUM SERPL-SCNC: 3.5 MMOL/L — SIGNIFICANT CHANGE UP (ref 3.5–5.3)
PROT SERPL-MCNC: 7.8 G/DL — SIGNIFICANT CHANGE UP (ref 6–8.3)
RBC # BLD: 4.11 M/UL — SIGNIFICANT CHANGE UP (ref 3.8–5.2)
RBC # FLD: 14.5 % — SIGNIFICANT CHANGE UP (ref 10.3–14.5)
SODIUM SERPL-SCNC: 142 MMOL/L — SIGNIFICANT CHANGE UP (ref 135–145)
WBC # BLD: 7.1 K/UL — SIGNIFICANT CHANGE UP (ref 3.8–10.5)
WBC # FLD AUTO: 7.1 K/UL — SIGNIFICANT CHANGE UP (ref 3.8–10.5)

## 2020-03-29 PROCEDURE — 99239 HOSP IP/OBS DSCHRG MGMT >30: CPT

## 2020-03-29 RX ORDER — LOSARTAN POTASSIUM 100 MG/1
1 TABLET, FILM COATED ORAL
Qty: 0 | Refills: 0 | DISCHARGE
Start: 2020-03-29

## 2020-03-29 RX ORDER — ACETAMINOPHEN 500 MG
2 TABLET ORAL
Qty: 0 | Refills: 0 | DISCHARGE
Start: 2020-03-29

## 2020-03-29 RX ORDER — METFORMIN HYDROCHLORIDE 850 MG/1
2 TABLET ORAL
Qty: 0 | Refills: 0 | DISCHARGE

## 2020-03-29 RX ORDER — LOSARTAN POTASSIUM 100 MG/1
1 TABLET, FILM COATED ORAL
Qty: 0 | Refills: 0 | DISCHARGE

## 2020-03-29 RX ORDER — ATENOLOL 25 MG/1
1 TABLET ORAL
Qty: 0 | Refills: 0 | DISCHARGE

## 2020-03-29 RX ORDER — ATORVASTATIN CALCIUM 80 MG/1
1 TABLET, FILM COATED ORAL
Qty: 0 | Refills: 0 | DISCHARGE

## 2020-03-29 RX ORDER — ATENOLOL 25 MG/1
1 TABLET ORAL
Qty: 0 | Refills: 0 | DISCHARGE
Start: 2020-03-29

## 2020-03-29 RX ORDER — ASPIRIN/CALCIUM CARB/MAGNESIUM 324 MG
2 TABLET ORAL
Qty: 0 | Refills: 0 | DISCHARGE

## 2020-03-29 RX ORDER — ALBUTEROL 90 UG/1
2 AEROSOL, METERED ORAL
Qty: 1 | Refills: 0
Start: 2020-03-29 | End: 2020-04-11

## 2020-03-29 RX ADMIN — Medication 650 MILLIGRAM(S): at 05:45

## 2020-03-29 RX ADMIN — ALBUTEROL 2 PUFF(S): 90 AEROSOL, METERED ORAL at 06:20

## 2020-03-29 RX ADMIN — HEPARIN SODIUM 5000 UNIT(S): 5000 INJECTION INTRAVENOUS; SUBCUTANEOUS at 07:22

## 2020-03-29 RX ADMIN — Medication 650 MILLIGRAM(S): at 04:45

## 2020-03-29 RX ADMIN — ALBUTEROL 2 PUFF(S): 90 AEROSOL, METERED ORAL at 12:46

## 2020-03-29 RX ADMIN — LOSARTAN POTASSIUM 100 MILLIGRAM(S): 100 TABLET, FILM COATED ORAL at 06:21

## 2020-03-29 RX ADMIN — HEPARIN SODIUM 5000 UNIT(S): 5000 INJECTION INTRAVENOUS; SUBCUTANEOUS at 00:04

## 2020-03-29 RX ADMIN — ALBUTEROL 2 PUFF(S): 90 AEROSOL, METERED ORAL at 00:04

## 2020-03-29 RX ADMIN — Medication 600 MILLIGRAM(S): at 06:20

## 2020-03-29 NOTE — DISCHARGE NOTE PROVIDER - NSDCFUADDINST_GEN_ALL_CORE_FT
Howie Hill)  Internal Medicine  [address] [place] [address]  Phone: [phone]  Fax: [phone]  Established Patient  Follow Up Time:

## 2020-03-29 NOTE — DISCHARGE NOTE PROVIDER - HOSPITAL COURSE
65 year old female patient with PMH of HTN, HLD, DM, unrepaired abdominal hernia, who presented with complaints of fever, shortness of breath, "chest tightness", and tachycardia. Found to be + COVID 19.          Acute respiratory failure with hypoxia.     - due to viral PNA in the setting of COVID-19    - received Plaquenil    - maintain O2 sat >92%.          Fever.     - + COVID 19 LFTs uptrending (plateau), completed course of Plaquenil    - Supportive measures: Tylenol PRN fever/pain, avoid NSAIDS.    -Albuterol HFA ATC, mucinex BID, s/p azithromycin    -Continue incentive spirometry.         Transaminitis.      -LFTS were uptrending likely due to COVID.         Hypertension.     -BP above goal; resume home dose losartan.         Diabetes mellitus.      - A1C: 7.1    - Not on Insulin, reports taking Metformin- hold while inpatient.     - Fingerstick monitoring, sliding scale coverage as needed.         Problem/Plan - 7:    ·  Problem: Preventive measure.  Plan: - DVT ppx: Heparin SQ     - Airborne / Contact Isolation.     - Diet as tolerated.             On 3/29/20, case was discussed with , patient is medically cleared and optimized for discharge today. All medications were reviewed with attending, and sent to mutually agreed upon pharmacy.  Patient was discharged on home oxygen as well and provided with a Rolling walker for ambulation. 65 year old female patient with PMH of HTN, HLD, DM, unrepaired abdominal hernia, who presented with complaints of fever, shortness of breath, "chest tightness", and tachycardia. Found to be + COVID 19.          Acute respiratory failure with hypoxia.     - due to viral PNA in the setting of COVID-19    - received Plaquenil    - maintain O2 sat >92%.          Fever.     - + COVID 19 LFTs uptrending (plateau), completed course of Plaquenil    - Supportive measures: Tylenol PRN fever/pain, avoid NSAIDS.    -Albuterol HFA ATC, mucinex BID, s/p azithromycin    -Continue incentive spirometry.         Transaminitis.      -LFTS were uptrending likely due to COVID, now stable.         Hypertension.     -BP above goal; resume home dose losartan.         Diabetes mellitus.      - A1C: 7.1    - Not on Insulin, reports taking Metformin- hold while inpatient.     - Fingerstick monitoring, sliding scale coverage as needed.         Problem/Plan - 7:    ·  Problem: Preventive measure.  Plan: - DVT ppx: Heparin SQ     - Airborne / Contact Isolation.     - Diet as tolerated.             On 3/29/20, case was discussed with , patient is medically cleared and optimized for discharge today. All medications were reviewed with attending, and sent to mutually agreed upon pharmacy.  Patient was discharged on home oxygen as well and provided with a Rolling walker for ambulation. 65 year old female patient with PMH of HTN, HLD, DM, unrepaired abdominal hernia, who presented with complaints of fever, shortness of breath, "chest tightness", and tachycardia. Found to be + COVID 19.          Acute respiratory failure with hypoxia.     - due to viral PNA in the setting of COVID-19    - received Plaquenil    - maintain O2 sat >92% with supplemental oxygen         Fever.     - + COVID 19 LFTs uptrending (plateau), completed course of Plaquenil    - Supportive measures: Tylenol PRN fever/pain, avoid NSAIDS.    -Albuterol HFA ATC, mucinex BID, s/p azithromycin    -Continue incentive spirometry.         Transaminitis.      -LFTS were uptrending likely due to COVID, now stable.         Hypertension.     -BP above goal; resume home dose losartan.         Diabetes mellitus.      - A1C: 7.1    - Not on Insulin, reports taking Metformin- hold while inpatient.     - Fingerstick monitoring, sliding scale coverage as needed.         Problem/Plan - 7:    ·  Problem: Preventive measure.  Plan: - DVT ppx: Heparin SQ     - Airborne / Contact Isolation.     - Diet as tolerated.             On 3/29/20, case was discussed with , patient is medically cleared and optimized for discharge today. All medications were reviewed with attending, and sent to mutually agreed upon pharmacy.  Patient was discharged on home oxygen as well and provided with a Rolling walker for ambulation.

## 2020-03-29 NOTE — CHART NOTE - NSCHARTNOTEFT_GEN_A_CORE
Patient seen and examined. Chart and labs reviewed.    Breathing is at baseline. O2 sat at goal. Stable for dc with home oxygen.    I spent 35 minutes counseling this patient and coordinating their discharge.

## 2020-03-29 NOTE — DISCHARGE NOTE PROVIDER - CARE PROVIDER_API CALL
Howie Hill)  Internal Medicine  [address] [place] [address]  Phone: [phone]  Fax: [phone]  Established Patient  Follow Up Time:

## 2020-03-29 NOTE — DISCHARGE NOTE PROVIDER - NSDCCPCAREPLAN_GEN_ALL_CORE_FT
PRINCIPAL DISCHARGE DIAGNOSIS  Diagnosis: Acute respiratory failure with hypoxia  Assessment and Plan of Treatment: You have been diagnosed with the COVID-19 virus during your hospital stay. You must self quarantine to complete a 14 day time period.  Monitor for fevers, shortness of breath and cough primarily.  Monitor your temperature daily to not any changes and increases.    It has been determined that you no longer need hospitalization and can recover while remaining in self-quarantine at home. You should follow the prevention steps below until a healthcare provider or local or state health department says you can return to your normal activities.  1. You should restrict activities outside your home, except for getting medical care.  2. Do not go to work, school, or public areas.  3. Avoid using public transportation, ride-sharing, or taxis.  4. Separate yourself from other people and animals in your home.  5. Call ahead before visiting your doctor.  6. Wear a facemask.  7. Cover your coughs and sneezes.  8. Clean your hands often.  9. Avoid sharing personal household items.  10. Clean all “high-touch” surfaces everyday.  11. Monitor your symptoms.  If you have a medical emergency and need to call 911, notify the dispatch personnel that you have COVID-19 If possible, put on a facemask before emergency medical services arrive.  12. Stopping home isolation.  Patients with confirmed COVID-19 should remain under home isolation precautions for 14 days since the positive COVID-19 test and until the risk of secondary transmission to others is thought to be low. The decision to discontinue home isolation precautions should be made on a case-by-case basis, in consultation with healthcare providers and state and local health departments. [facility] Department of Health can be reached at [phone] for further information about COVID-19.        SECONDARY DISCHARGE DIAGNOSES  Diagnosis: Acute respiratory failure with hypoxia  Assessment and Plan of Treatment: Acute respiratory failure with hypoxia

## 2020-03-29 NOTE — DISCHARGE NOTE PROVIDER - NSDCMRMEDTOKEN_GEN_ALL_CORE_FT
acetaminophen 325 mg oral tablet: 2 tab(s) orally every 6 hours, As needed, Temp greater or equal to 38C (100.4F), Mild Pain (1 - 3)  albuterol 90 mcg/inh inhalation aerosol: 2 puff(s) inhaled every 6 hours  aspirin 81 mg oral tablet, chewable: 1 tab(s) orally once a day  Farxiga 5 mg oral tablet: 1 tab(s) orally once a day  guaiFENesin 600 mg oral tablet, extended release: 1 tab(s) orally every 12 hours, As Needed -for cough   metFORMIN 500 mg oral tablet: 1 tab(s) orally 2 times a day (with meals)  pantoprazole 40 mg oral delayed release tablet: 1 tab(s) orally once a day (before a meal)  Rolling walker: 1 device to patient for ambulation acetaminophen 325 mg oral tablet: 2 tab(s) orally every 6 hours, As needed, Temp greater or equal to 38C (100.4F), Mild Pain (1 - 3)  albuterol 90 mcg/inh inhalation aerosol: 2 puff(s) inhaled every 6 hours  aspirin 81 mg oral tablet, chewable: 1 tab(s) orally once a day  Farxiga 5 mg oral tablet: 1 tab(s) orally once a day  guaiFENesin 600 mg oral tablet, extended release: 1 tab(s) orally every 12 hours, As Needed -for cough   losartan 100 mg oral tablet: 1 tab(s) orally once a day  metFORMIN 500 mg oral tablet: 1 tab(s) orally 2 times a day (with meals)  pantoprazole 40 mg oral delayed release tablet: 1 tab(s) orally once a day (before a meal)  Rolling walker: 1 device to patient for ambulation acetaminophen 325 mg oral tablet: 2 tab(s) orally every 6 hours, As needed, Temp greater or equal to 38C (100.4F), Mild Pain (1 - 3)  albuterol 90 mcg/inh inhalation aerosol: 2 puff(s) inhaled every 6 hours  aspirin 81 mg oral tablet, chewable: 1 tab(s) orally once a day  atenolol 50 mg oral tablet: 1 tab(s) orally once a day  Farxiga 5 mg oral tablet: 1 tab(s) orally once a day  guaiFENesin 600 mg oral tablet, extended release: 1 tab(s) orally every 12 hours, As Needed -for cough   metFORMIN 500 mg oral tablet: 1 tab(s) orally 2 times a day (with meals)  pantoprazole 40 mg oral delayed release tablet: 1 tab(s) orally once a day (before a meal)  Rolling walker: 1 device to patient for ambulation

## 2020-03-29 NOTE — DISCHARGE NOTE NURSING/CASE MANAGEMENT/SOCIAL WORK - PATIENT PORTAL LINK FT
You can access the FollowMyHealth Patient Portal offered by [facility] by registering at the following website: [email or web address]. By joining [employer]’s FollowMyHealth portal, you will also be able to view your health information using other applications (apps) compatible with our system.

## 2020-03-29 NOTE — DISCHARGE NOTE PROVIDER - NSFOLLOWUPCLINICS_GEN_ALL_ED_FT
[facility]  Internal Medicine  [address] [place] [address]  Phone: [phone]  Fax: [phone]  Follow Up Time:

## 2020-06-15 PROBLEM — G62.9 POLYNEUROPATHY, UNSPECIFIED: Chronic | Status: ACTIVE | Noted: 2020-03-20

## 2020-06-15 PROBLEM — E78.5 HYPERLIPIDEMIA, UNSPECIFIED: Chronic | Status: ACTIVE | Noted: 2020-03-20

## 2020-06-15 PROBLEM — E11.9 TYPE 2 DIABETES MELLITUS WITHOUT COMPLICATIONS: Chronic | Status: ACTIVE | Noted: 2020-03-20

## 2020-06-15 PROBLEM — I10 ESSENTIAL (PRIMARY) HYPERTENSION: Chronic | Status: ACTIVE | Noted: 2020-03-20

## 2020-07-22 ENCOUNTER — APPOINTMENT (OUTPATIENT)
Dept: VASCULAR SURGERY | Facility: CLINIC | Age: 65
End: 2020-07-22

## 2020-09-09 ENCOUNTER — APPOINTMENT (OUTPATIENT)
Dept: VASCULAR SURGERY | Facility: CLINIC | Age: 65
End: 2020-09-09
Payer: MEDICARE

## 2020-09-09 VITALS
HEART RATE: 60 BPM | DIASTOLIC BLOOD PRESSURE: 87 MMHG | HEIGHT: 66 IN | TEMPERATURE: 98.7 F | SYSTOLIC BLOOD PRESSURE: 178 MMHG | BODY MASS INDEX: 35.03 KG/M2 | WEIGHT: 218 LBS

## 2020-09-09 DIAGNOSIS — I83.893 VARICOSE VEINS OF BILATERAL LOWER EXTREMITIES WITH OTHER COMPLICATIONS: ICD-10-CM

## 2020-09-09 PROCEDURE — 99214 OFFICE O/P EST MOD 30 MIN: CPT

## 2020-09-09 PROCEDURE — 93970 EXTREMITY STUDY: CPT

## 2020-09-24 NOTE — PROGRESS NOTE ADULT - PROBLEM/PLAN-3
DISPLAY PLAN FREE TEXT Post-Care Instructions: I reviewed with the patient in detail post-care instructions. Patient should avoid sun until area fully healed.

## 2020-12-16 ENCOUNTER — APPOINTMENT (OUTPATIENT)
Dept: VASCULAR SURGERY | Facility: CLINIC | Age: 65
End: 2020-12-16

## 2021-09-04 ENCOUNTER — APPOINTMENT (OUTPATIENT)
Dept: ULTRASOUND IMAGING | Facility: IMAGING CENTER | Age: 66
End: 2021-09-04
Payer: MEDICARE

## 2021-09-04 ENCOUNTER — APPOINTMENT (OUTPATIENT)
Dept: MAMMOGRAPHY | Facility: IMAGING CENTER | Age: 66
End: 2021-09-04
Payer: MEDICARE

## 2021-09-04 ENCOUNTER — OUTPATIENT (OUTPATIENT)
Dept: OUTPATIENT SERVICES | Facility: HOSPITAL | Age: 66
LOS: 1 days | End: 2021-09-04
Payer: MEDICARE

## 2021-09-04 DIAGNOSIS — K46.9 UNSPECIFIED ABDOMINAL HERNIA WITHOUT OBSTRUCTION OR GANGRENE: Chronic | ICD-10-CM

## 2021-09-04 DIAGNOSIS — Z87.828 PERSONAL HISTORY OF OTHER (HEALED) PHYSICAL INJURY AND TRAUMA: Chronic | ICD-10-CM

## 2021-09-04 DIAGNOSIS — Z98.891 HISTORY OF UTERINE SCAR FROM PREVIOUS SURGERY: Chronic | ICD-10-CM

## 2021-09-04 DIAGNOSIS — Z00.8 ENCOUNTER FOR OTHER GENERAL EXAMINATION: ICD-10-CM

## 2021-09-04 DIAGNOSIS — Z98.890 OTHER SPECIFIED POSTPROCEDURAL STATES: Chronic | ICD-10-CM

## 2021-09-04 PROCEDURE — 76641 ULTRASOUND BREAST COMPLETE: CPT | Mod: 26,50

## 2021-09-04 PROCEDURE — 77067 SCR MAMMO BI INCL CAD: CPT | Mod: 26

## 2021-09-04 PROCEDURE — 76641 ULTRASOUND BREAST COMPLETE: CPT

## 2021-09-04 PROCEDURE — 77067 SCR MAMMO BI INCL CAD: CPT

## 2021-09-04 PROCEDURE — 77063 BREAST TOMOSYNTHESIS BI: CPT | Mod: 26

## 2021-09-04 PROCEDURE — 77063 BREAST TOMOSYNTHESIS BI: CPT

## 2021-10-09 ENCOUNTER — OUTPATIENT (OUTPATIENT)
Dept: OUTPATIENT SERVICES | Facility: HOSPITAL | Age: 66
LOS: 1 days | End: 2021-10-09

## 2021-10-09 DIAGNOSIS — Z98.890 OTHER SPECIFIED POSTPROCEDURAL STATES: Chronic | ICD-10-CM

## 2021-10-09 DIAGNOSIS — Z98.891 HISTORY OF UTERINE SCAR FROM PREVIOUS SURGERY: Chronic | ICD-10-CM

## 2021-10-09 DIAGNOSIS — Z87.828 PERSONAL HISTORY OF OTHER (HEALED) PHYSICAL INJURY AND TRAUMA: Chronic | ICD-10-CM

## 2021-10-09 DIAGNOSIS — K46.9 UNSPECIFIED ABDOMINAL HERNIA WITHOUT OBSTRUCTION OR GANGRENE: Chronic | ICD-10-CM

## 2021-10-09 DIAGNOSIS — Z00.8 ENCOUNTER FOR OTHER GENERAL EXAMINATION: ICD-10-CM

## 2021-11-12 ENCOUNTER — APPOINTMENT (OUTPATIENT)
Dept: ULTRASOUND IMAGING | Facility: IMAGING CENTER | Age: 66
End: 2021-11-12
Payer: MEDICARE

## 2021-11-12 ENCOUNTER — OUTPATIENT (OUTPATIENT)
Dept: OUTPATIENT SERVICES | Facility: HOSPITAL | Age: 66
LOS: 1 days | End: 2021-11-12
Payer: MEDICARE

## 2021-11-12 DIAGNOSIS — Z00.8 ENCOUNTER FOR OTHER GENERAL EXAMINATION: ICD-10-CM

## 2021-11-12 DIAGNOSIS — Z98.891 HISTORY OF UTERINE SCAR FROM PREVIOUS SURGERY: Chronic | ICD-10-CM

## 2021-11-12 DIAGNOSIS — K46.9 UNSPECIFIED ABDOMINAL HERNIA WITHOUT OBSTRUCTION OR GANGRENE: Chronic | ICD-10-CM

## 2021-11-12 DIAGNOSIS — Z98.890 OTHER SPECIFIED POSTPROCEDURAL STATES: Chronic | ICD-10-CM

## 2021-11-12 DIAGNOSIS — Z87.828 PERSONAL HISTORY OF OTHER (HEALED) PHYSICAL INJURY AND TRAUMA: Chronic | ICD-10-CM

## 2021-11-12 PROCEDURE — 76642 ULTRASOUND BREAST LIMITED: CPT | Mod: 26,RT

## 2021-11-12 PROCEDURE — 76642 ULTRASOUND BREAST LIMITED: CPT

## 2021-11-23 ENCOUNTER — APPOINTMENT (OUTPATIENT)
Dept: ULTRASOUND IMAGING | Facility: IMAGING CENTER | Age: 66
End: 2021-11-23
Payer: MEDICARE

## 2021-11-23 ENCOUNTER — RESULT REVIEW (OUTPATIENT)
Age: 66
End: 2021-11-23

## 2021-11-23 ENCOUNTER — OUTPATIENT (OUTPATIENT)
Dept: OUTPATIENT SERVICES | Facility: HOSPITAL | Age: 66
LOS: 1 days | End: 2021-11-23
Payer: MEDICARE

## 2021-11-23 DIAGNOSIS — Z87.828 PERSONAL HISTORY OF OTHER (HEALED) PHYSICAL INJURY AND TRAUMA: Chronic | ICD-10-CM

## 2021-11-23 DIAGNOSIS — Z00.8 ENCOUNTER FOR OTHER GENERAL EXAMINATION: ICD-10-CM

## 2021-11-23 DIAGNOSIS — Z98.891 HISTORY OF UTERINE SCAR FROM PREVIOUS SURGERY: Chronic | ICD-10-CM

## 2021-11-23 DIAGNOSIS — K46.9 UNSPECIFIED ABDOMINAL HERNIA WITHOUT OBSTRUCTION OR GANGRENE: Chronic | ICD-10-CM

## 2021-11-23 DIAGNOSIS — Z98.890 OTHER SPECIFIED POSTPROCEDURAL STATES: Chronic | ICD-10-CM

## 2021-11-23 PROCEDURE — A4648: CPT

## 2021-11-23 PROCEDURE — 77065 DX MAMMO INCL CAD UNI: CPT | Mod: 26,RT

## 2021-11-23 PROCEDURE — 88305 TISSUE EXAM BY PATHOLOGIST: CPT | Mod: 26

## 2021-11-23 PROCEDURE — 19084 BX BREAST ADD LESION US IMAG: CPT

## 2021-11-23 PROCEDURE — 19084 BX BREAST ADD LESION US IMAG: CPT | Mod: RT

## 2021-11-23 PROCEDURE — 88305 TISSUE EXAM BY PATHOLOGIST: CPT

## 2021-11-23 PROCEDURE — 19083 BX BREAST 1ST LESION US IMAG: CPT

## 2021-11-23 PROCEDURE — 19083 BX BREAST 1ST LESION US IMAG: CPT | Mod: RT

## 2021-11-23 PROCEDURE — 88360 TUMOR IMMUNOHISTOCHEM/MANUAL: CPT | Mod: 26

## 2021-11-23 PROCEDURE — 77065 DX MAMMO INCL CAD UNI: CPT

## 2021-11-23 PROCEDURE — 88360 TUMOR IMMUNOHISTOCHEM/MANUAL: CPT

## 2021-12-08 ENCOUNTER — APPOINTMENT (OUTPATIENT)
Dept: SURGICAL ONCOLOGY | Facility: CLINIC | Age: 66
End: 2021-12-08
Payer: MEDICARE

## 2021-12-08 VITALS
HEART RATE: 63 BPM | TEMPERATURE: 97.6 F | BODY MASS INDEX: 35.03 KG/M2 | OXYGEN SATURATION: 98 % | RESPIRATION RATE: 16 BRPM | DIASTOLIC BLOOD PRESSURE: 79 MMHG | SYSTOLIC BLOOD PRESSURE: 158 MMHG | WEIGHT: 218 LBS | HEIGHT: 66 IN

## 2021-12-08 PROCEDURE — 99204 OFFICE O/P NEW MOD 45 MIN: CPT

## 2021-12-08 NOTE — PHYSICAL EXAM
[Normal] : supple, no neck mass and thyroid not enlarged [Normal Supraclavicular Lymph Nodes] : normal supraclavicular lymph nodes [Normal Axillary Lymph Nodes] : normal axillary lymph nodes [Normal] : oriented to person, place and time, with appropriate affect [FreeTextEntry1] : LIZ present during exam  [de-identified] : large; no masses, nipple discharge, skin dimpling, inversion or deviation bilaterally  [de-identified] : in wheelchair ; uses cane as well

## 2021-12-08 NOTE — HISTORY OF PRESENT ILLNESS
[de-identified] : Ms. JOVITA WALLACE  is a 66 year  old female  presenting for an initial consultation for newly diagnosed right breast cancer. Pt. is a former patient. \par \par Patient underwent a bilateral screening mammogram/sonogram on 9/4/2021 which showed an irregular hypoechoic mass at the 12:00, 3 cm from nipple right breast, felt to correspond with a mammographic mass. Targeted right breast ultrasound was recommended for further evaluation.  Subsequently, pt. underwent a right breast ultrasound on 11/12/2021 which showed an irregular hypoechoic mass measuring 5 mm in the right breast 12:00 for which US guided core biopsy is recommended (BIRADS 5). \par \par Patient is s/p 2 site right breast US guided biopsy on 11/23/2021 with the following pathology: \par 1. BREAST, RIGHT, 12 O'CLOCK 3CMFN; ULTRASOUND GUIDED CORE BIOPSY:\par - BREAST PARENCHYMA WITH MULTINUCLEATED GIANT CELLS, FOREIGN BODY MATERIAL AND SCAR FORMATION, COMPATIBLE WITH PRIOR BIOPSY SITE.\par \par 2. BREAST, RIGHT, 11 O'CLOCK 13CMFN; ULTRASOUND GUIDED CORE BIOPSY:\par - INVASIVE MODERATELY DIFFERENTIATED DUCTAL CARCINOMA\par - LEVI SCORE 6 /9 (3 + 2 + 1) IN THIS LIMITED MATERIAL\par - INVASIVE TUMOR MEASURES AT LEAST 0.6 CM\par - DUCTAL CARCINOMA IN SITU, SOLID PATTERN WITH INTERMEDIATE GRADE NUCLEAR ATYPIA\par - LYMPHOVASCULAR PERMEATION BY TUMOR NOT SEEN\par -ER(+) MD (+) HER2(-)\par \par The results at the first site, right breast 12:00 axis, 3 cm from the nipple are BENIGN AND DISCORDANT.\par The results at the second site, right breast 11:00 axis, 13 cm from the nipple, demonstrating sonographic mammographic concordance, are CONCORDANT.\par \par Denies palpable breast masses, nipple discharge, skin dimpling, inversion or breast pain. \par \par Reports a history of benign breast biopsy for calcifications.  Denies personal or family history of breast or ovarian cancer. Denies past or current use of exogenous HRT.  \par \par Past medical history notable for HTN, DM, GERD, unrepaired abdominal hernia, COVID-19 3/2020. Past surgical history includes cataract surgery and hysterectomy.   Ambulates with a cane and requires a wheelchair at times.

## 2021-12-08 NOTE — ASSESSMENT
[FreeTextEntry1] : IMP:\par Patient underwent a bilateral screening mammogram/sonogram on 9/4/2021 which showed an irregular hypoechoic mass at the 12:00, 3 cm from nipple right breast, felt to correspond with a mammographic mass. Targeted right breast ultrasound was recommended for further evaluation.  Subsequently, pt. underwent a right breast ultrasound on 11/12/2021 which showed an irregular hypoechoic mass measuring 5 mm in the right breast 12:00 for which US guided core biopsy is recommended (BIRADS 5). \par \par Patient is s/p 2 site right breast US guided biopsy on 11/23/2021 with the following pathology: \par 1. BREAST, RIGHT, 12 O'CLOCK 3CMFN;  Benign and concordant\par 2. BREAST, RIGHT, 11 O'CLOCK 13CMFN; ULTRASOUND GUIDED CORE BIOPSY:  Invasive moderately differentiated ductal carcinoma, DCIS with intermediate grade nuclear atypia, ER(+) VA (+) HER2(-). \par \par \par PLAN:\par 1) Plan for right magseed localized lumpectomy and right axillary sentinel lymph node biopsy \par 2) Medical clearance

## 2022-01-06 ENCOUNTER — APPOINTMENT (OUTPATIENT)
Dept: ULTRASOUND IMAGING | Facility: IMAGING CENTER | Age: 67
End: 2022-01-06

## 2022-01-21 ENCOUNTER — OUTPATIENT (OUTPATIENT)
Dept: OUTPATIENT SERVICES | Facility: HOSPITAL | Age: 67
LOS: 1 days | End: 2022-01-21
Payer: MEDICARE

## 2022-01-21 VITALS
WEIGHT: 266.1 LBS | DIASTOLIC BLOOD PRESSURE: 78 MMHG | RESPIRATION RATE: 16 BRPM | OXYGEN SATURATION: 99 % | SYSTOLIC BLOOD PRESSURE: 140 MMHG | HEART RATE: 62 BPM | HEIGHT: 63 IN | TEMPERATURE: 97 F

## 2022-01-21 DIAGNOSIS — Z90.711 ACQUIRED ABSENCE OF UTERUS WITH REMAINING CERVICAL STUMP: Chronic | ICD-10-CM

## 2022-01-21 DIAGNOSIS — E11.9 TYPE 2 DIABETES MELLITUS WITHOUT COMPLICATIONS: ICD-10-CM

## 2022-01-21 DIAGNOSIS — G47.33 OBSTRUCTIVE SLEEP APNEA (ADULT) (PEDIATRIC): ICD-10-CM

## 2022-01-21 DIAGNOSIS — C50.919 MALIGNANT NEOPLASM OF UNSPECIFIED SITE OF UNSPECIFIED FEMALE BREAST: ICD-10-CM

## 2022-01-21 DIAGNOSIS — I10 ESSENTIAL (PRIMARY) HYPERTENSION: ICD-10-CM

## 2022-01-21 DIAGNOSIS — Z87.828 PERSONAL HISTORY OF OTHER (HEALED) PHYSICAL INJURY AND TRAUMA: Chronic | ICD-10-CM

## 2022-01-21 DIAGNOSIS — E66.01 MORBID (SEVERE) OBESITY DUE TO EXCESS CALORIES: ICD-10-CM

## 2022-01-21 DIAGNOSIS — Z98.890 OTHER SPECIFIED POSTPROCEDURAL STATES: Chronic | ICD-10-CM

## 2022-01-21 DIAGNOSIS — Z98.891 HISTORY OF UTERINE SCAR FROM PREVIOUS SURGERY: Chronic | ICD-10-CM

## 2022-01-21 DIAGNOSIS — K46.9 UNSPECIFIED ABDOMINAL HERNIA WITHOUT OBSTRUCTION OR GANGRENE: Chronic | ICD-10-CM

## 2022-01-21 LAB
A1C WITH ESTIMATED AVERAGE GLUCOSE RESULT: 6.8 % — HIGH (ref 4–5.6)
ALBUMIN SERPL ELPH-MCNC: 4.2 G/DL — SIGNIFICANT CHANGE UP (ref 3.3–5)
ALP SERPL-CCNC: 111 U/L — SIGNIFICANT CHANGE UP (ref 40–120)
ALT FLD-CCNC: 9 U/L — SIGNIFICANT CHANGE UP (ref 4–33)
ANION GAP SERPL CALC-SCNC: 13 MMOL/L — SIGNIFICANT CHANGE UP (ref 7–14)
AST SERPL-CCNC: 13 U/L — SIGNIFICANT CHANGE UP (ref 4–32)
BILIRUB SERPL-MCNC: 0.5 MG/DL — SIGNIFICANT CHANGE UP (ref 0.2–1.2)
BUN SERPL-MCNC: 16 MG/DL — SIGNIFICANT CHANGE UP (ref 7–23)
CALCIUM SERPL-MCNC: 9.6 MG/DL — SIGNIFICANT CHANGE UP (ref 8.4–10.5)
CHLORIDE SERPL-SCNC: 101 MMOL/L — SIGNIFICANT CHANGE UP (ref 98–107)
CO2 SERPL-SCNC: 29 MMOL/L — SIGNIFICANT CHANGE UP (ref 22–31)
CREAT SERPL-MCNC: 0.71 MG/DL — SIGNIFICANT CHANGE UP (ref 0.5–1.3)
ESTIMATED AVERAGE GLUCOSE: 148 — SIGNIFICANT CHANGE UP
GLUCOSE SERPL-MCNC: 110 MG/DL — HIGH (ref 70–99)
HCT VFR BLD CALC: 36.3 % — SIGNIFICANT CHANGE UP (ref 34.5–45)
HGB BLD-MCNC: 10.7 G/DL — LOW (ref 11.5–15.5)
MCHC RBC-ENTMCNC: 25.4 PG — LOW (ref 27–34)
MCHC RBC-ENTMCNC: 29.5 GM/DL — LOW (ref 32–36)
MCV RBC AUTO: 86 FL — SIGNIFICANT CHANGE UP (ref 80–100)
NRBC # BLD: 0 /100 WBCS — SIGNIFICANT CHANGE UP
NRBC # FLD: 0 K/UL — SIGNIFICANT CHANGE UP
PLATELET # BLD AUTO: 238 K/UL — SIGNIFICANT CHANGE UP (ref 150–400)
POTASSIUM SERPL-MCNC: 3.2 MMOL/L — LOW (ref 3.5–5.3)
POTASSIUM SERPL-SCNC: 3.2 MMOL/L — LOW (ref 3.5–5.3)
PROT SERPL-MCNC: 7 G/DL — SIGNIFICANT CHANGE UP (ref 6–8.3)
RBC # BLD: 4.22 M/UL — SIGNIFICANT CHANGE UP (ref 3.8–5.2)
RBC # FLD: 15.4 % — HIGH (ref 10.3–14.5)
SODIUM SERPL-SCNC: 143 MMOL/L — SIGNIFICANT CHANGE UP (ref 135–145)
WBC # BLD: 5.68 K/UL — SIGNIFICANT CHANGE UP (ref 3.8–10.5)
WBC # FLD AUTO: 5.68 K/UL — SIGNIFICANT CHANGE UP (ref 3.8–10.5)

## 2022-01-21 PROCEDURE — 93010 ELECTROCARDIOGRAM REPORT: CPT

## 2022-01-21 RX ORDER — DAPAGLIFLOZIN 10 MG/1
1 TABLET, FILM COATED ORAL
Qty: 0 | Refills: 0 | DISCHARGE

## 2022-01-21 RX ORDER — SODIUM CHLORIDE 9 MG/ML
1000 INJECTION, SOLUTION INTRAVENOUS
Refills: 0 | Status: DISCONTINUED | OUTPATIENT
Start: 2022-02-03 | End: 2022-02-17

## 2022-01-21 NOTE — H&P PST ADULT - NSICDXPASTSURGICALHX_GEN_ALL_CORE_FT
PAST SURGICAL HISTORY:  Abdominal hernia     H/O  section     H/O eye surgery     History of meniscal tear s/p surgery right knee    History of partial hysterectomy

## 2022-01-21 NOTE — H&P PST ADULT - NSICDXPASTMEDICALHX_GEN_ALL_CORE_FT
PAST MEDICAL HISTORY:  Breast cancer     Diabetes mellitus     DM (diabetes mellitus)     GERD (gastroesophageal reflux disease)     Glaucoma     History of transient ischemic attack (TIA) 15-20 years ago, unclear history.    HTN (hypertension)     Hyperlipidemia     Hypertension     Neuropathy     Obesity (BMI 30-39.9)     PAD (peripheral artery disease)

## 2022-01-21 NOTE — H&P PST ADULT - ASSESSMENT
65 yo female presents to PST unit with pre-op diagnosis of malignant neoplasm of breast scheduled for right breast lumpectomy, post magseed localization, right axillary sentinel lymph node biopsy with both blue dye and nuclear injection in the OR with Dr. Gold.

## 2022-01-21 NOTE — H&P PST ADULT - PROBLEM SELECTOR PLAN 1
Scheduled for right breast lumpectomy, post magseed localization, right axillary sentinel lymph node biopsy with both blue dye and nuclear injection in the OR with Dr. Gold on 02/03/2022.  Verbal and written pre-op instructions provided to patient. Patient verbalized understanding and will call surgeons office for revised instructions if surgery is rescheduled.   patient given verbal and written instruction with teach back on chlorhexidine shampoo, and the patient verbalized understanding with return demonstration.   Omeprazole for GI prophylaxis.   Pt. not vaccinated - Patient aware of need for COVID testing prior to  procedure at a [facility]  and advised to coordinate with surgeon to get tested within 72 hours of procedure.   Patient will obtain medical clearance as per surgeons request-copy requested for history of HTN & DM2.

## 2022-01-21 NOTE — H&P PST ADULT - NEGATIVE NEUROLOGICAL SYMPTOMS
no transient paralysis/no weakness/no paresthesias/no generalized seizures/no focal seizures/no difficulty walking

## 2022-01-21 NOTE — H&P PST ADULT - HISTORY OF PRESENT ILLNESS
67 yo female presents to PSt unit with pre-op diagnosis of malignant neoplasm of breast scheduled for right breast lumpectomy, post magseed localization, right axillary sentinel lymph node biopsy with both blue dye and nuclear injection in the OR with Dr. Gold.

## 2022-01-25 ENCOUNTER — APPOINTMENT (OUTPATIENT)
Dept: ULTRASOUND IMAGING | Facility: IMAGING CENTER | Age: 67
End: 2022-01-25

## 2022-01-25 ENCOUNTER — OUTPATIENT (OUTPATIENT)
Dept: OUTPATIENT SERVICES | Facility: HOSPITAL | Age: 67
LOS: 1 days | End: 2022-01-25
Payer: MEDICARE

## 2022-01-25 DIAGNOSIS — Z98.890 OTHER SPECIFIED POSTPROCEDURAL STATES: Chronic | ICD-10-CM

## 2022-01-25 DIAGNOSIS — Z90.711 ACQUIRED ABSENCE OF UTERUS WITH REMAINING CERVICAL STUMP: Chronic | ICD-10-CM

## 2022-01-25 DIAGNOSIS — Z98.891 HISTORY OF UTERINE SCAR FROM PREVIOUS SURGERY: Chronic | ICD-10-CM

## 2022-01-25 DIAGNOSIS — K46.9 UNSPECIFIED ABDOMINAL HERNIA WITHOUT OBSTRUCTION OR GANGRENE: Chronic | ICD-10-CM

## 2022-01-25 DIAGNOSIS — Z00.8 ENCOUNTER FOR OTHER GENERAL EXAMINATION: ICD-10-CM

## 2022-01-25 DIAGNOSIS — Z87.828 PERSONAL HISTORY OF OTHER (HEALED) PHYSICAL INJURY AND TRAUMA: Chronic | ICD-10-CM

## 2022-01-25 PROBLEM — Z86.73 PERSONAL HISTORY OF TRANSIENT ISCHEMIC ATTACK (TIA), AND CEREBRAL INFARCTION WITHOUT RESIDUAL DEFICITS: Chronic | Status: ACTIVE | Noted: 2020-03-20

## 2022-01-25 PROBLEM — H40.9 UNSPECIFIED GLAUCOMA: Chronic | Status: ACTIVE | Noted: 2022-01-21

## 2022-01-25 PROBLEM — C50.919 MALIGNANT NEOPLASM OF UNSPECIFIED SITE OF UNSPECIFIED FEMALE BREAST: Chronic | Status: ACTIVE | Noted: 2022-01-21

## 2022-01-25 PROCEDURE — 19286 PERQ DEV BREAST ADD US IMAG: CPT | Mod: RT

## 2022-01-25 PROCEDURE — 19285 PERQ DEV BREAST 1ST US IMAG: CPT | Mod: RT

## 2022-01-25 PROCEDURE — 19286 PERQ DEV BREAST ADD US IMAG: CPT

## 2022-01-25 PROCEDURE — 19285 PERQ DEV BREAST 1ST US IMAG: CPT

## 2022-01-25 PROCEDURE — C1739: CPT

## 2022-02-02 ENCOUNTER — FORM ENCOUNTER (OUTPATIENT)
Age: 67
End: 2022-02-02

## 2022-02-02 ENCOUNTER — TRANSCRIPTION ENCOUNTER (OUTPATIENT)
Age: 67
End: 2022-02-02

## 2022-02-02 ENCOUNTER — RESULT REVIEW (OUTPATIENT)
Age: 67
End: 2022-02-02

## 2022-02-02 VITALS
RESPIRATION RATE: 18 BRPM | HEART RATE: 62 BPM | OXYGEN SATURATION: 100 % | DIASTOLIC BLOOD PRESSURE: 65 MMHG | SYSTOLIC BLOOD PRESSURE: 164 MMHG | WEIGHT: 266.1 LBS

## 2022-02-02 NOTE — ASU PREOPERATIVE ASSESSMENT, ADULT ([facility]ARK ONLY) - FALL HARM RISK - HARM RISK INTERVENTIONS

## 2022-02-03 ENCOUNTER — RESULT REVIEW (OUTPATIENT)
Age: 67
End: 2022-02-03

## 2022-02-03 ENCOUNTER — NON-APPOINTMENT (OUTPATIENT)
Age: 67
End: 2022-02-03

## 2022-02-03 ENCOUNTER — APPOINTMENT (OUTPATIENT)
Dept: SURGICAL ONCOLOGY | Facility: AMBULATORY SURGERY CENTER | Age: 67
End: 2022-02-03

## 2022-02-03 ENCOUNTER — APPOINTMENT (OUTPATIENT)
Dept: MAMMOGRAPHY | Facility: IMAGING CENTER | Age: 67
End: 2022-02-03
Payer: MEDICARE

## 2022-02-03 ENCOUNTER — APPOINTMENT (OUTPATIENT)
Dept: NUCLEAR MEDICINE | Facility: IMAGING CENTER | Age: 67
End: 2022-02-03

## 2022-02-03 ENCOUNTER — OUTPATIENT (OUTPATIENT)
Dept: OUTPATIENT SERVICES | Facility: HOSPITAL | Age: 67
LOS: 1 days | End: 2022-02-03
Payer: COMMERCIAL

## 2022-02-03 ENCOUNTER — OUTPATIENT (OUTPATIENT)
Dept: OUTPATIENT SERVICES | Facility: HOSPITAL | Age: 67
LOS: 1 days | Discharge: HOME | End: 2022-02-03
Payer: MEDICARE

## 2022-02-03 VITALS
OXYGEN SATURATION: 98 % | DIASTOLIC BLOOD PRESSURE: 58 MMHG | RESPIRATION RATE: 15 BRPM | HEART RATE: 71 BPM | TEMPERATURE: 98 F | SYSTOLIC BLOOD PRESSURE: 140 MMHG

## 2022-02-03 DIAGNOSIS — K46.9 UNSPECIFIED ABDOMINAL HERNIA WITHOUT OBSTRUCTION OR GANGRENE: Chronic | ICD-10-CM

## 2022-02-03 DIAGNOSIS — Z98.890 OTHER SPECIFIED POSTPROCEDURAL STATES: Chronic | ICD-10-CM

## 2022-02-03 DIAGNOSIS — Z87.828 PERSONAL HISTORY OF OTHER (HEALED) PHYSICAL INJURY AND TRAUMA: Chronic | ICD-10-CM

## 2022-02-03 DIAGNOSIS — C50.919 MALIGNANT NEOPLASM OF UNSPECIFIED SITE OF UNSPECIFIED FEMALE BREAST: ICD-10-CM

## 2022-02-03 DIAGNOSIS — Z90.711 ACQUIRED ABSENCE OF UTERUS WITH REMAINING CERVICAL STUMP: Chronic | ICD-10-CM

## 2022-02-03 DIAGNOSIS — Z00.8 ENCOUNTER FOR OTHER GENERAL EXAMINATION: ICD-10-CM

## 2022-02-03 DIAGNOSIS — Z98.891 HISTORY OF UTERINE SCAR FROM PREVIOUS SURGERY: Chronic | ICD-10-CM

## 2022-02-03 LAB
GLUCOSE BLDC GLUCOMTR-MCNC: 126 MG/DL — HIGH (ref 70–99)
POTASSIUM SERPL-MCNC: 4 MMOL/L — SIGNIFICANT CHANGE UP (ref 3.5–5.3)
POTASSIUM SERPL-SCNC: 4 MMOL/L — SIGNIFICANT CHANGE UP (ref 3.5–5.3)

## 2022-02-03 PROCEDURE — 38790 INJECT FOR LYMPHATIC X-RAY: CPT | Mod: RT

## 2022-02-03 PROCEDURE — A9541: CPT

## 2022-02-03 PROCEDURE — 19301 PARTIAL MASTECTOMY: CPT

## 2022-02-03 PROCEDURE — 88305 TISSUE EXAM BY PATHOLOGIST: CPT | Mod: 26

## 2022-02-03 PROCEDURE — 88307 TISSUE EXAM BY PATHOLOGIST: CPT | Mod: 26

## 2022-02-03 PROCEDURE — 76098 X-RAY EXAM SURGICAL SPECIMEN: CPT

## 2022-02-03 PROCEDURE — 76098 X-RAY EXAM SURGICAL SPECIMEN: CPT | Mod: 26

## 2022-02-03 DEVICE — CLIP APPLIER COVIDIEN SURGICLIP 11.5" MEDIUM: Type: IMPLANTABLE DEVICE | Site: RIGHT | Status: FUNCTIONAL

## 2022-02-03 RX ORDER — ALBUTEROL 90 UG/1
2 AEROSOL, METERED ORAL
Qty: 0 | Refills: 0 | DISCHARGE

## 2022-02-03 RX ORDER — FLUTICASONE PROPIONATE 50 MCG
1 SPRAY, SUSPENSION NASAL
Qty: 0 | Refills: 0 | DISCHARGE

## 2022-02-03 RX ORDER — LATANOPROST 0.05 MG/ML
1 SOLUTION/ DROPS OPHTHALMIC; TOPICAL
Qty: 0 | Refills: 0 | DISCHARGE

## 2022-02-03 RX ORDER — OMEPRAZOLE 10 MG/1
1 CAPSULE, DELAYED RELEASE ORAL
Qty: 0 | Refills: 0 | DISCHARGE

## 2022-02-03 RX ORDER — ATENOLOL 25 MG/1
1 TABLET ORAL
Qty: 0 | Refills: 0 | DISCHARGE

## 2022-02-03 RX ORDER — LOSARTAN POTASSIUM 100 MG/1
1 TABLET, FILM COATED ORAL
Qty: 0 | Refills: 0 | DISCHARGE

## 2022-02-03 RX ORDER — ATORVASTATIN CALCIUM 80 MG/1
1 TABLET, FILM COATED ORAL
Qty: 0 | Refills: 0 | DISCHARGE

## 2022-02-03 RX ORDER — METFORMIN HYDROCHLORIDE 850 MG/1
1 TABLET ORAL
Qty: 0 | Refills: 0 | DISCHARGE

## 2022-02-03 NOTE — ASU DISCHARGE PLAN (ADULT/PEDIATRIC) - NS MD DC FALL RISK RISK
For information on Fall & Injury Prevention, visit: [email or web address] OR  [email or web address] OR  https://www.cdc.gov/steadi/patient.html

## 2022-02-03 NOTE — BRIEF OPERATIVE NOTE - NSICDXBRIEFPROCEDURE_GEN_ALL_CORE_FT
PROCEDURES:  Lumpectomy of right breast with sentinel node biopsy 03-Feb-2022 15:00:07  Carlos Mari

## 2022-02-03 NOTE — ASU DISCHARGE PLAN (ADULT/PEDIATRIC) - ASU DC SPECIAL INSTRUCTIONSFT
[facility]  CANCER  I  N  S  T  I  T  U  T E     Department of Surgery  Division of Surgical Oncology    Avelino Lewis M.D., VEL.LESLY.  Chief, Division of Surgical Oncology    Chinmay Bee M.D., F.A.C.S., F.A.S.CARLY.  Associate , Department of Surgery    Francois Mart M.D., F.A.C.S.  Vargas Mcleod M.D., F.A.C.S.  Arcenio Estes M.D., F.A.C.S.  Delta Price M.D., F.A.C.S.  Vargas Read M.D., F.A.C.S.  Russell Gold M.D., F.BILLC.S.      Breast Biopsy/Lumpectomy Post-operative Instructions  1.	Supportive bra- Please bring a sports/athletic bra with you on the day of your surgery.  You will wear it home from the hospital.  You should wear the supportive bra continuously for 48 hours after the procedure, including wearing it to sleep.  Therefore, you may wear your regular bra.  You may remove the bra to shower.  The sports bra will provide support, decrease the amount of swelling at the biopsy site, and make your recovery more comfortable.    2.	Wound dressing- There is a dressing on the wound, which consists of 2 layers.  The outer layer is a clear plastic dressing (called Tegaderm) which is waterproof.  This should remain in place for 2 days post-operatively.  On the 2nd post-operative day, you should remove the clear plastic Tegaderm dressing.  Underneath the Tegaderm dressing, there are white surgical tapes (called steri strips) which are directly adherent to the skin.  These should remain on the skin, and will peel off naturally.  All of the stitches are “internal” and will dissolve naturally.    3.	Showering/Bathing- You may shower over the dressing the very next day after surgery.  Allow the water to run over the dressing, but don not scrub the area.  It is best not to sit in a bathtub or swimming pool for at least one week after surgery.    4.	Activity level- You may resume most normal daily activity as tolerated, but avoid strenuous activities such as aerobics, jogging, exercising or heavy lifting for at least 1 week after surgery.  You may return to work in 1-2 days after surgery.  You may drive as long as you are not taking any prescription pain medication.    5.	Pain Medication- You may take the prescribed medication, or you may take extra-strength Tylenol as needed.  Please don't take aspirin, Motrin, Advil or any other anti-inflammatory medications, as these medications may cause bleeding or bruising.    6.	Follow-up Appointment- Please call the office to schedule your post-operative appointment which should be approximately 10 days after your surgery. Office [phone]    7.	Bruising/Bleeding/Swelling- It is normal for there to be some bruising and swelling at the breast biopsy site, and there may be some staining of blood on to the dressing.  Some discomfort at the surgical site is expected.  If your symptoms seem excessive, or if you have any question or concerns, please call the office.      Anesthesia Precautions:  For the next 12 hours do not:   •	drive a car,  •	drink alcohol, beer, or wine,   •	make important personal or business decisions  Diet:   •	Progress diet slowly unless otherwise indicated    Physician Notification  •	Any Prescription issues  •	Bleeding that does not stop  •	Persistent nausea and vomiting  •	Pain not relieved by medications  •	Fever greater than 101®F  •	Inability to tolerate liquids or foods  •	Unable to urinate after 8 hours  Discharge and Disposition  •	Discharge to home/ group home/assisted living  •	Accompanied by Family/Spouse/ Parents/ Significant Other/ Friend/ and or Caregiver    Follow Up Care:  •	In the event that you develop a complication and you are unable to reach your own physician, you may contact:  [phone] or go to the nearest Emergency Room.   •	Please call your surgeon to schedule your follow up appointment [phone]

## 2022-02-03 NOTE — ASU DISCHARGE PLAN (ADULT/PEDIATRIC) - CARE PROVIDER_API CALL
Russell Gold)  Surgery  [address] [place] [id number]  Phone: [phone]  Fax: [phone]  Follow Up Time: 2 weeks

## 2022-02-03 NOTE — BRIEF OPERATIVE NOTE - OPERATION/FINDINGS
Right breast lumpectomy x2 (separate incisions) with magseed localization, and sentinel node biopsy.

## 2022-02-16 ENCOUNTER — APPOINTMENT (OUTPATIENT)
Dept: SURGICAL ONCOLOGY | Facility: CLINIC | Age: 67
End: 2022-02-16
Payer: MEDICARE

## 2022-02-16 VITALS
OXYGEN SATURATION: 96 % | HEART RATE: 112 BPM | WEIGHT: 266 LBS | RESPIRATION RATE: 16 BRPM | TEMPERATURE: 97.3 F | HEIGHT: 66 IN | BODY MASS INDEX: 42.75 KG/M2 | SYSTOLIC BLOOD PRESSURE: 144 MMHG | DIASTOLIC BLOOD PRESSURE: 80 MMHG

## 2022-02-16 LAB — SURGICAL PATHOLOGY STUDY: SIGNIFICANT CHANGE UP

## 2022-02-16 PROCEDURE — 99024 POSTOP FOLLOW-UP VISIT: CPT

## 2022-02-16 NOTE — REASON FOR VISIT
[Post-Op Visit] : a post-op visit for [Breast Cancer] : breast cancer [Post-Op] : a post-op for [FreeTextEntry2] : s/p right lumpectomy & right axillary SLNB 2/3/2022

## 2022-02-16 NOTE — ASSESSMENT
[FreeTextEntry1] : IMP:\par Ms.DIANE WALLACE  is a 67 y/o F presenting for an post op visit s/p right breast lumpectomy two sites using Magseed localization and right axillary sentinel lymph node biopsy on 2/3/2022. Final pathology pending. \par \par \par \par \par PLAN:\par RTO if seroma doesn’t improve

## 2022-02-16 NOTE — PHYSICAL EXAM
[Normal] : supple, no neck mass and thyroid not enlarged [Normal Supraclavicular Lymph Nodes] : normal supraclavicular lymph nodes [Normal Axillary Lymph Nodes] : normal axillary lymph nodes [Normal] : oriented to person, place and time, with appropriate affect [FreeTextEntry1] : ARINA present during exam  [de-identified] : Normal S1, S2.  Regular rate and rhythm. [de-identified] : Healing right breast incision with some seroma to the area [de-identified] : Clear breath sounds bilaterally, normal respiratory effort.

## 2022-02-16 NOTE — HISTORY OF PRESENT ILLNESS
[de-identified] : Ms.DIANE WALLACE  is a 67 y/o F presenting for an initial post op visit, s/p right breast lumpectomy two sites using Magseed localization and right axillary sentinel lymph node biopsy on 2/3/2022. Final pathology pending. \par \par \par PERTINENT HISTORY: \par Ms. JOVITA WALLACE  is a 66 year  old female  who initially presented in 12/2021 for newly diagnosed right breast cancer. Pt. is a former patient. \par \par Patient underwent a bilateral screening mammogram/sonogram on 9/4/2021 which showed an irregular hypoechoic mass at the 12:00, 3 cm from nipple right breast, felt to correspond with a mammographic mass. Targeted right breast ultrasound was recommended for further evaluation.  Subsequently, pt. underwent a right breast ultrasound on 11/12/2021 which showed an irregular hypoechoic mass measuring 5 mm in the right breast 12:00 for which US guided core biopsy is recommended (BIRADS 5). \par \par Mammo (2/3/2022): Specimen radiography #1 confirms that the MagSeed and the sought after ribbonshaped marker are contained within the surgical specimen. Specimen radiography #2 confirms that the MagSeed, the sought after heart shaped marker and associated mass are contained within the surgical specimen. \par \par Patient is s/p 2 site right breast US guided biopsy on 11/23/2021 with the following pathology: \par 1. BREAST, RIGHT, 12 O'CLOCK 3CMFN; ULTRASOUND GUIDED CORE BIOPSY:\par - BREAST PARENCHYMA WITH MULTINUCLEATED GIANT CELLS, FOREIGN BODY MATERIAL AND SCAR FORMATION, COMPATIBLE WITH PRIOR BIOPSY SITE.\par 2. BREAST, RIGHT, 11 O'CLOCK 13CMFN; ULTRASOUND GUIDED CORE BIOPSY:\par - INVASIVE MODERATELY DIFFERENTIATED DUCTAL CARCINOMA\par - LEVI SCORE 6 /9 (3 + 2 + 1) IN THIS LIMITED MATERIAL\par - INVASIVE TUMOR MEASURES AT LEAST 0.6 CM\par - DUCTAL CARCINOMA IN SITU, SOLID PATTERN WITH INTERMEDIATE GRADE NUCLEAR ATYPIA\par - LYMPHOVASCULAR PERMEATION BY TUMOR NOT SEEN\par -ER(+) NE (+) HER2(-)\par \par The results at the first site, right breast 12:00 axis, 3 cm from the nipple are BENIGN AND DISCORDANT.\par The results at the second site, right breast 11:00 axis, 13 cm from the nipple, demonstrating sonographic mammographic concordance, are CONCORDANT.\par \par Denies palpable breast masses, nipple discharge, skin dimpling, inversion or breast pain. \par \par Reports a history of benign breast biopsy for calcifications.  Denies personal or family history of breast or ovarian cancer. Denies past or current use of exogenous HRT.  \par \par Past medical history notable for HTN, DM, GERD, unrepaired abdominal hernia, COVID-19 3/2020. Past surgical history includes cataract surgery and hysterectomy.   Ambulates with a cane and requires a wheelchair at times.

## 2022-03-15 ENCOUNTER — NON-APPOINTMENT (OUTPATIENT)
Age: 67
End: 2022-03-15

## 2022-03-21 ENCOUNTER — OUTPATIENT (OUTPATIENT)
Dept: OUTPATIENT SERVICES | Facility: HOSPITAL | Age: 67
LOS: 1 days | Discharge: HOME | End: 2022-03-21

## 2022-03-21 DIAGNOSIS — K46.9 UNSPECIFIED ABDOMINAL HERNIA WITHOUT OBSTRUCTION OR GANGRENE: Chronic | ICD-10-CM

## 2022-03-21 DIAGNOSIS — Z98.891 HISTORY OF UTERINE SCAR FROM PREVIOUS SURGERY: Chronic | ICD-10-CM

## 2022-03-21 DIAGNOSIS — Z90.711 ACQUIRED ABSENCE OF UTERUS WITH REMAINING CERVICAL STUMP: Chronic | ICD-10-CM

## 2022-03-21 DIAGNOSIS — C50.919 MALIGNANT NEOPLASM OF UNSPECIFIED SITE OF UNSPECIFIED FEMALE BREAST: ICD-10-CM

## 2022-03-21 DIAGNOSIS — Z98.890 OTHER SPECIFIED POSTPROCEDURAL STATES: Chronic | ICD-10-CM

## 2022-03-21 DIAGNOSIS — Z87.828 PERSONAL HISTORY OF OTHER (HEALED) PHYSICAL INJURY AND TRAUMA: Chronic | ICD-10-CM

## 2022-03-21 RX ORDER — FLUOCINONIDE 1 MG/G
0.1 CREAM TOPICAL TWICE DAILY
Qty: 30 | Refills: 3 | Status: DISCONTINUED | COMMUNITY
Start: 2017-06-27 | End: 2022-03-21

## 2022-03-21 RX ORDER — KETOCONAZOLE 20.5 MG/ML
2 SHAMPOO, SUSPENSION TOPICAL
Qty: 120 | Refills: 0 | Status: DISCONTINUED | COMMUNITY
Start: 2018-01-20 | End: 2022-03-21

## 2022-03-21 RX ORDER — FLUCONAZOLE 200 MG/1
200 TABLET ORAL
Qty: 1 | Refills: 0 | Status: DISCONTINUED | COMMUNITY
Start: 2018-04-06 | End: 2022-03-21

## 2022-03-21 RX ORDER — DOXAZOSIN 2 MG/1
2 TABLET ORAL
Qty: 30 | Refills: 0 | Status: DISCONTINUED | COMMUNITY
Start: 2018-03-04 | End: 2022-03-21

## 2022-03-21 RX ORDER — ESOMEPRAZOLE MAGNESIUM 40 MG/1
40 CAPSULE, DELAYED RELEASE ORAL
Qty: 30 | Refills: 0 | Status: DISCONTINUED | COMMUNITY
Start: 2017-04-02 | End: 2022-03-21

## 2022-03-21 RX ORDER — OXYCODONE 5 MG/1
5 TABLET ORAL
Qty: 10 | Refills: 0 | Status: DISCONTINUED | COMMUNITY
Start: 2022-02-07 | End: 2022-03-21

## 2022-03-21 RX ORDER — AMMONIUM LACTATE 12 %
12 CREAM (GRAM) TOPICAL TWICE DAILY
Qty: 280 | Refills: 6 | Status: DISCONTINUED | COMMUNITY
Start: 2017-06-27 | End: 2022-03-21

## 2022-03-21 RX ORDER — LOSARTAN POTASSIUM AND HYDROCHLOROTHIAZIDE 25; 100 MG/1; MG/1
100-25 TABLET ORAL
Qty: 30 | Refills: 0 | Status: DISCONTINUED | COMMUNITY
Start: 2018-01-14 | End: 2022-03-21

## 2022-03-21 RX ORDER — ATORVASTATIN CALCIUM 20 MG/1
20 TABLET, FILM COATED ORAL
Qty: 30 | Refills: 0 | Status: DISCONTINUED | COMMUNITY
Start: 2018-03-04 | End: 2022-03-21

## 2022-03-23 ENCOUNTER — APPOINTMENT (OUTPATIENT)
Dept: HEMATOLOGY ONCOLOGY | Facility: CLINIC | Age: 67
End: 2022-03-23
Payer: MEDICARE

## 2022-03-23 VITALS
HEART RATE: 71 BPM | RESPIRATION RATE: 18 BRPM | DIASTOLIC BLOOD PRESSURE: 81 MMHG | BODY MASS INDEX: 44.32 KG/M2 | WEIGHT: 265.99 LBS | OXYGEN SATURATION: 98 % | HEIGHT: 65 IN | TEMPERATURE: 97.9 F | SYSTOLIC BLOOD PRESSURE: 145 MMHG

## 2022-03-23 PROCEDURE — 99205 OFFICE O/P NEW HI 60 MIN: CPT

## 2022-03-29 NOTE — CONSULT LETTER
[Dear  ___] : Dear  [unfilled], [Consult Letter:] : I had the pleasure of evaluating your patient, [unfilled]. [Please see my note below.] : Please see my note below. [Consult Closing:] : Thank you very much for allowing me to participate in the care of this patient.  If you have any questions, please do not hesitate to contact me. [Sincerely,] : Sincerely, [FreeTextEntry2] : Dr. Russell Gold [FreeTextEntry3] : Alexa Brooks MD\par Division of Medical Oncology and Hematology\par [facility]\par [employer] [facility]\par [place], [place]

## 2022-03-29 NOTE — PHYSICAL EXAM
[Ambulatory and capable of all self care but unable to carry out any work activities] : Status 2- Ambulatory and capable of all self care but unable to carry out any work activities. Up and about more than 50% of waking hours [Obese] : obese [Normal] : bilateral breasts without nipple retraction, skin dimpling or palpable masses; the bilateral axillae are without adenopathy [de-identified] : healing right breast

## 2022-03-29 NOTE — REASON FOR VISIT
[Initial Consultation] : an initial consultation [Family Member] : family member [FreeTextEntry2] : Breast cancer

## 2022-03-29 NOTE — ASSESSMENT
[FreeTextEntry1] : In summary, Ms. JOVITA WALLACE is a 67 year old postmenopausal female with stage IA (T1b, N0, Mx) ER positive, ME positive, HER-2/rodrigo negative invasive ductal carcinoma of the right breast. She is status post lumpectomy on 2/23/2022 and is scheduled to meet with Dr Arellano for radiation Oncology consultation. ODX 4\par \par I discussed the treatment for stage I breast cancer with the patient including the role of chemotherapy, radiation therapy and endocrine therapy. Oncotype DX score is low and therefore chemotherapy will have no additional benefit over endocrine therapy alone (TAILORx)\par \par I recommend Arimidex 1 mg daily for 5-10 years. I discussed the risks and benefits of aromatase inhibitor therapy including fatigue, coronary artery disease, hyperlipidemia, vaginal dryness, mood changes, hot flashes, GI disturbances, arthralgias, myalgias, and osteoporosis. I will obtain bone density scan to evaluate her bone health prior to starting anastrozole. I recommend her to start calcium and vitamin D supplementation. \par \par Her medical comorbidities are managed well. She will continue to follow with her PCP for general health maintenance including fasting lipid profile every year. She will start anastrozole 2 weeks after finishing radiation therapy. She will continue annual mammograms. I will see her every 3 months to monitor treatment side effects. \par \par The patient had plenty of time to ask questions and all of her questions were answered to her satisfaction. I gave her my office phone number and encouraged her to call with any questions or additional information.\par \par

## 2022-04-01 ENCOUNTER — OUTPATIENT (OUTPATIENT)
Dept: OUTPATIENT SERVICES | Facility: HOSPITAL | Age: 67
LOS: 1 days | Discharge: HOME | End: 2022-04-01
Payer: MEDICARE

## 2022-04-01 ENCOUNTER — APPOINTMENT (OUTPATIENT)
Dept: RADIATION ONCOLOGY | Facility: CLINIC | Age: 67
End: 2022-04-01

## 2022-04-01 VITALS
DIASTOLIC BLOOD PRESSURE: 80 MMHG | SYSTOLIC BLOOD PRESSURE: 151 MMHG | HEIGHT: 65 IN | HEART RATE: 74 BPM | OXYGEN SATURATION: 96 % | RESPIRATION RATE: 17 BRPM | WEIGHT: 266 LBS | BODY MASS INDEX: 44.32 KG/M2 | TEMPERATURE: 97 F

## 2022-04-01 DIAGNOSIS — Z98.891 HISTORY OF UTERINE SCAR FROM PREVIOUS SURGERY: Chronic | ICD-10-CM

## 2022-04-01 DIAGNOSIS — Z87.828 PERSONAL HISTORY OF OTHER (HEALED) PHYSICAL INJURY AND TRAUMA: Chronic | ICD-10-CM

## 2022-04-01 DIAGNOSIS — Z90.711 ACQUIRED ABSENCE OF UTERUS WITH REMAINING CERVICAL STUMP: Chronic | ICD-10-CM

## 2022-04-01 DIAGNOSIS — Z98.890 OTHER SPECIFIED POSTPROCEDURAL STATES: Chronic | ICD-10-CM

## 2022-04-01 DIAGNOSIS — K46.9 UNSPECIFIED ABDOMINAL HERNIA WITHOUT OBSTRUCTION OR GANGRENE: Chronic | ICD-10-CM

## 2022-04-01 PROCEDURE — 77263 THER RADIOLOGY TX PLNG CPLX: CPT

## 2022-04-01 NOTE — VITALS
[Maximal Pain Intensity: 2/10] : 2/10 [Least Pain Intensity: 0/10] : 0/10 [Pain Description/Quality: ___] : Pain description/quality: [unfilled] [Pain Duration: ___] : Pain duration: [unfilled] [Pain Location: ___] : Pain Location: [unfilled] [ECOG Performance Status: 3 - Capable of only limited self care, confined to bed or chair more than 50% of waking hours] : Performance Status: 3 - Capable of only limited self care, confined to bed or chair more than 50% of waking hours

## 2022-04-05 NOTE — HISTORY OF PRESENT ILLNESS
[FreeTextEntry1] : Ms. Alva is a 67 year old female who presents in consultation for consideration of radiation therapy.\par \par Diagnosis: pT1b pN0 pMx Right Breast, upper outer quadrant, Invasive moderately differentiated ductal carcinoma, no special type, 0.8 cm greatest dimension, without calcifications or necrosis.  Surgical margins of excision are indeterminate due to specimen fragmentation at tumor margin.  LN negative.   ER + (80%)  [place] + (95%) HER2 - \par Oncotype Dx score  4 \par \par HPI:\par 8/10/19 - Mammo/Sono:  No mammographic or sonographic evidence of malignancy.  BIRADS 1 \par 9/4/21 -  Mammo: No suspicious mass, calcifications or sign of malignancy identified in left breast.  There is a 9 mm spiculated mass in the upper central right breast corresponding with the sonographic finding at 12:00 3 cm FN location. Scattered benign type calcifications bilaterally. \par Breast Sono:  Irregular hypoechoic mass at the 12:00, 3 cm FN right breast, felt to correspond with a mammographic mass.  Targeted right breast US is recommended for further evaluation.  Evaluation of right axilla is also recommended.  \par BIRADS 0\par 11/12/21 - US Breast Limited Right:  Right breast 12:00 axis, 3 cm FN  - irregular, hypoechoic mass with posterior shadowing measuring 5 mm.  Biopsy recommended.  BIRADS 5\par \par 11/23/21 - underwent 2 site right breast core biopsy:   the right breast showed 12:00 3 cm FN; breast parenchyma with multinucleated giant cells, foreign body material and scar formation.  Right breast, 11:00, 13 cm FN showed Invasive moderately differentiated ductal carcinoma, Tiffanie score 6 /9, measuring 0.6 cm, ER+ 80%, PgR + 95%, HER-2:Negative, Ductal carcinoma in situ, solid pattern with intermediate grade nuclear atypia, No Lymphovascular permeation seen. \par \par 2/3/22 - underwent Right Breast upper outer quadrant lumpectomy with sentinel lymph node biopsy.  Invasive moderately differentiated ductal carcinoma, no special type, 0.8 cm greatest dimension, without calcifications or necrosis.  Surgical margins of excision are indeterminate due to specimen fragmentation at tumor margin.  LN negative (6 sentinel nodes removed).\par Also underwent right breast subareolar mass, excisional biopsy which revealed Breast with focal dense fibrosis, fat necrosis and hemosiderin deposition consistent with organizing biopsy site scar. Adjacent areas consisted of foreign body giant cell reaction and chronic inflammation.  \par \par 3/23/22 - saw Dr. JODIE Brooks (oncology) in consultation, no indication for chemotherapy, referred to radiation and to start on Anastrazole after radiation.  \par \par 4/1/22 - being seen in consultation for consideration of adjuvant radiation to right breast\par

## 2022-04-05 NOTE — PHYSICAL EXAM
[No UE Edema] : there is no upper extremity edema [Normal] : oriented to person, place and time, the affect was normal, the mood was normal and not anxious [de-identified] : right breast and axillary scars healing well

## 2022-04-15 ENCOUNTER — NON-APPOINTMENT (OUTPATIENT)
Age: 67
End: 2022-04-15

## 2022-04-26 PROCEDURE — 77300 RADIATION THERAPY DOSE PLAN: CPT | Mod: 26

## 2022-04-26 PROCEDURE — 77334 RADIATION TREATMENT AID(S): CPT | Mod: 26

## 2022-04-26 PROCEDURE — 77295 3-D RADIOTHERAPY PLAN: CPT | Mod: 26

## 2022-05-10 PROCEDURE — 77280 THER RAD SIMULAJ FIELD SMPL: CPT | Mod: 26

## 2022-05-17 ENCOUNTER — NON-APPOINTMENT (OUTPATIENT)
Age: 67
End: 2022-05-17

## 2022-05-17 ENCOUNTER — APPOINTMENT (OUTPATIENT)
Dept: INTERNAL MEDICINE | Facility: CLINIC | Age: 67
End: 2022-05-17
Payer: MEDICARE

## 2022-05-17 VITALS
HEIGHT: 65 IN | TEMPERATURE: 98.3 F | BODY MASS INDEX: 46.32 KG/M2 | OXYGEN SATURATION: 98 % | HEART RATE: 89 BPM | SYSTOLIC BLOOD PRESSURE: 136 MMHG | WEIGHT: 278 LBS | RESPIRATION RATE: 18 BRPM | DIASTOLIC BLOOD PRESSURE: 92 MMHG

## 2022-05-17 DIAGNOSIS — M53.86 OTHER SPECIFIED DORSOPATHIES, LUMBAR REGION: ICD-10-CM

## 2022-05-17 DIAGNOSIS — Z23 ENCOUNTER FOR IMMUNIZATION: ICD-10-CM

## 2022-05-17 DIAGNOSIS — H40.9 UNSPECIFIED GLAUCOMA: ICD-10-CM

## 2022-05-17 DIAGNOSIS — H91.90 UNSPECIFIED HEARING LOSS, UNSPECIFIED EAR: ICD-10-CM

## 2022-05-17 DIAGNOSIS — Z00.00 ENCOUNTER FOR GENERAL ADULT MEDICAL EXAMINATION W/OUT ABNORMAL FINDINGS: ICD-10-CM

## 2022-05-17 PROCEDURE — G0438: CPT

## 2022-05-17 PROCEDURE — 93000 ELECTROCARDIOGRAM COMPLETE: CPT

## 2022-05-17 PROCEDURE — 99214 OFFICE O/P EST MOD 30 MIN: CPT | Mod: 25

## 2022-05-17 NOTE — HISTORY OF PRESENT ILLNESS
[FreeTextEntry1] : radha [de-identified] : right breast cancer stg1, lumpectomy, to begin RTx, ER/Pr pos her 2 neg, to begin arimidex. \par 2 wks ago, developed right buttocks pain- resolved after a few days but today returned. will occassionaly radiate down to knee. \par saw neuro and was started on gabapentin 100mg tid for ble neuropathy\par 3-2020 had covid , [place] vaccines. \par pneumovax 23 x1\par declines shingrex.\par bilat knee pain- djd.gets stem cell inj in the knees. \par glaucoma- right eye- sees optho, left eye ok\par right abd hernia

## 2022-05-17 NOTE — ASSESSMENT
[FreeTextEntry1] : RLE sciatica- trial pred. last a1c was <7. cont gabapentin, get results of MRI l- s spine . \par DM2- chk fs qd, metformin, chk labs\par HLD- chk labs, cont atorv\par HTN- stable, cont atenolol, losartan, hct\par Glaucoma- f/u with optho, cont drops\par Bilat Knee pain- f/u with ortho\par Chronic Venous Insuff- low Na diet, hct. wt loss\par Morbid obesity- discussed low calorie diet and exercise as part of a weight loss plan.\par Breast Cancer- cont with RTx and arimidex per surgeon/rad onc/ onc. \par Decr Hearing- ENT f/u\par Discussed healthy lifestyle, updated vaccinations, healthy diet, exercise, chk labs, discussed appropriate screening tests including colonoscopy, mammo, bone density and pap.\par see optho yearly\par chk feet nightly\par take all meds as prescribed\par aware of complications of diabetes including but not limited to retinopathy,neuropathy, cvd, esrd,cva,amputations. \par aware of the importance of diet and exercise and maintaining an adequate weight\par chk labs\par

## 2022-05-17 NOTE — HEALTH RISK ASSESSMENT
[Good] : ~his/her~  mood as  good [Never] : Never [No] : In the past 12 months have you used drugs other than those required for medical reasons? No [No falls in past year] : Patient reported no falls in the past year [0] : 2) Feeling down, depressed, or hopeless: Not at all (0) [PHQ-2 Negative - No further assessment needed] : PHQ-2 Negative - No further assessment needed [Patient reported mammogram was abnormal] : Patient reported mammogram was abnormal [Patient reported PAP Smear was normal] : Patient reported PAP Smear was normal [Patient reported bone density results were normal] : Patient reported bone density results were normal [Patient reported colonoscopy was normal] : Patient reported colonoscopy was normal [None] : None [With Significant Other] : lives with significant other [Employed] : employed [Retired] : retired [] :  [Feels Safe at Home] : Feels safe at home [Fully functional (bathing, dressing, toileting, transferring, walking, feeding)] : Fully functional (bathing, dressing, toileting, transferring, walking, feeding) [Fully functional (using the telephone, shopping, preparing meals, housekeeping, doing laundry, using] : Fully functional and needs no help or supervision to perform IADLs (using the telephone, shopping, preparing meals, housekeeping, doing laundry, using transportation, managing medications and managing finances) [Audit-CScore] : 0 [de-identified] : no exc [de-identified] : healthy [[id number]] : 0 [Change in mental status noted] : No change in mental status noted [Language] : denies difficulty with language [Behavior] : denies difficulty with behavior [Handling Complex Tasks] : denies difficulty handling complex tasks [Reasoning] : denies difficulty with reasoning [Reports changes in hearing] : Reports no changes in hearing [Reports changes in vision] : Reports no changes in vision [Reports changes in dental health] : Reports no changes in dental health [MammogramDate] : 2/2022 [MammogramComments] : right breast ca [PapSmearDate] : 2/2022 [BoneDensityDate] : 2022 [ColonoscopyDate] : 2017 [AdvancecareDate] : 5/17/22

## 2022-05-17 NOTE — REVIEW OF SYSTEMS
[Vision Problems] : vision problems [Back Pain] : back pain [Skin Rash] : skin rash [Negative] : Heme/Lymph

## 2022-05-18 ENCOUNTER — NON-APPOINTMENT (OUTPATIENT)
Age: 67
End: 2022-05-18

## 2022-05-18 LAB
25(OH)D3 SERPL-MCNC: 40.2 NG/ML
ALBUMIN SERPL ELPH-MCNC: 4.2 G/DL
ALP BLD-CCNC: 129 U/L
ALT SERPL-CCNC: 9 U/L
ANION GAP SERPL CALC-SCNC: 15 MMOL/L
AST SERPL-CCNC: 14 U/L
BASOPHILS # BLD AUTO: 0.03 K/UL
BASOPHILS NFR BLD AUTO: 0.5 %
BILIRUB SERPL-MCNC: 0.4 MG/DL
BUN SERPL-MCNC: 11 MG/DL
CALCIUM SERPL-MCNC: 9.3 MG/DL
CHLORIDE SERPL-SCNC: 102 MMOL/L
CHOLEST SERPL-MCNC: 132 MG/DL
CO2 SERPL-SCNC: 26 MMOL/L
CREAT SERPL-MCNC: 0.52 MG/DL
EGFR: 102 ML/MIN/1.73M2
EOSINOPHIL # BLD AUTO: 0.12 K/UL
EOSINOPHIL NFR BLD AUTO: 2.2 %
ESTIMATED AVERAGE GLUCOSE: 157 MG/DL
GLUCOSE SERPL-MCNC: 99 MG/DL
HBA1C MFR BLD HPLC: 7.1 %
HCT VFR BLD CALC: 33.3 %
HDLC SERPL-MCNC: 41 MG/DL
HGB BLD-MCNC: 9.8 G/DL
IMM GRANULOCYTES NFR BLD AUTO: 0.2 %
LDLC SERPL CALC-MCNC: 68 MG/DL
LYMPHOCYTES # BLD AUTO: 1.82 K/UL
LYMPHOCYTES NFR BLD AUTO: 32.6 %
MAN DIFF?: NORMAL
MCHC RBC-ENTMCNC: 24.3 PG
MCHC RBC-ENTMCNC: 29.4 GM/DL
MCV RBC AUTO: 82.6 FL
MONOCYTES # BLD AUTO: 0.33 K/UL
MONOCYTES NFR BLD AUTO: 5.9 %
NEUTROPHILS # BLD AUTO: 3.27 K/UL
NEUTROPHILS NFR BLD AUTO: 58.6 %
NONHDLC SERPL-MCNC: 90 MG/DL
PLATELET # BLD AUTO: 242 K/UL
POTASSIUM SERPL-SCNC: 3.9 MMOL/L
PROT SERPL-MCNC: 6.5 G/DL
RBC # BLD: 4.03 M/UL
RBC # FLD: 15.8 %
SODIUM SERPL-SCNC: 142 MMOL/L
T3 SERPL-MCNC: 78 NG/DL
T4 FREE SERPL-MCNC: 1.1 NG/DL
TRIGL SERPL-MCNC: 109 MG/DL
TSH SERPL-ACNC: 1.13 UIU/ML
VIT B12 SERPL-MCNC: 526 PG/ML
WBC # FLD AUTO: 5.58 K/UL

## 2022-05-18 PROCEDURE — 77280 THER RAD SIMULAJ FIELD SMPL: CPT | Mod: 26

## 2022-05-18 NOTE — HISTORY OF PRESENT ILLNESS
[FreeTextEntry1] : Ms. Alva is a 67 year old female who is being seen for on treatment visit.  \par \par Diagnosis: pT1b pN0 pMx Right Breast, upper outer quadrant, Invasive moderately differentiated ductal carcinoma, no special type, 0.8 cm greatest dimension, without calcifications or necrosis.  Surgical margins of excision are indeterminate due to specimen fragmentation at tumor margin.  LN negative.   ER + (80%)  NM + (95%) HER2 - \par Oncotype Dx score  4 \par \par Oncologic Management:\par 11/23/21 - underwent 2 site right breast core biopsy:   the right breast showed 12:00 3 cm FN; breast parenchyma with multinucleated giant cells, foreign body material and scar formation.  Right breast, 11:00, 13 cm FN showed Invasive moderately differentiated ductal carcinoma, Eads score 6 /9, measuring 0.6 cm, ER+ 80%, PgR + 95%, HER-2:Negative, Ductal carcinoma in situ, solid pattern with intermediate grade nuclear atypia, No Lymphovascular permeation seen. \par \par 2/3/22 - underwent Right Breast upper outer quadrant lumpectomy with sentinel lymph node biopsy.  Invasive moderately differentiated ductal carcinoma, no special type, 0.8 cm greatest dimension, without calcifications or necrosis.  Surgical margins of excision are indeterminate due to specimen fragmentation at tumor margin.  LN negative (6 sentinel nodes removed).\par Also underwent right breast subareolar mass, excisional biopsy which revealed Breast with focal dense fibrosis, fat necrosis and hemosiderin deposition consistent with organizing biopsy site scar. Adjacent areas consisted of foreign body giant cell reaction and chronic inflammation.  \par \par 3/23/22 - saw Dr. JODIE Brooks (oncology) in consultation, no indication for chemotherapy, referred to radiation and to start on Anastrazole after radiation.  \par \par 5/2022 - planned Radiation therapy to Right Partial Breast 3000 cGy in 5 fx \par \par 5/18/22 -  VSIM done today ... reinforced skin care.

## 2022-05-18 NOTE — DISEASE MANAGEMENT
[Pathological] : TNM Stage: p [IB] : IB [TTNM] : 1b [NTNM] : 0 [MTNM] : 0 [de-identified] : 3000cGy [de-identified] : Right Partial Breast

## 2022-05-19 ENCOUNTER — NON-APPOINTMENT (OUTPATIENT)
Age: 67
End: 2022-05-19

## 2022-05-20 PROCEDURE — 77427 RADIATION TX MANAGEMENT X5: CPT

## 2022-05-23 ENCOUNTER — NON-APPOINTMENT (OUTPATIENT)
Age: 67
End: 2022-05-23

## 2022-05-23 VITALS
TEMPERATURE: 98 F | OXYGEN SATURATION: 100 % | HEIGHT: 65 IN | SYSTOLIC BLOOD PRESSURE: 149 MMHG | WEIGHT: 289.79 LBS | RESPIRATION RATE: 16 BRPM | BODY MASS INDEX: 48.28 KG/M2 | DIASTOLIC BLOOD PRESSURE: 76 MMHG | HEART RATE: 59 BPM

## 2022-05-23 DIAGNOSIS — Z92.3 PERSONAL HISTORY OF IRRADIATION: ICD-10-CM

## 2022-05-23 NOTE — DISEASE MANAGEMENT
[Pathological] : TNM Stage: p [IB] : IB [TTNM] : 1b [NTNM] : 0 [MTNM] : 0 [de-identified] : 1200cGy [de-identified] : 3000cGy [de-identified] : Right Partial Breast

## 2022-05-23 NOTE — HISTORY OF PRESENT ILLNESS
[FreeTextEntry1] : Ms. Alva is a 67 year old female who is being seen for on treatment visit.  \par \par Diagnosis: pT1b pN0 pMx Right Breast, upper outer quadrant, Invasive moderately differentiated ductal carcinoma, no special type, 0.8 cm greatest dimension, without calcifications or necrosis.  Surgical margins of excision are indeterminate due to specimen fragmentation at tumor margin.  LN negative.   ER + (80%)  MS + (95%) HER2 - \par Oncotype Dx score  4 \par \par Oncologic Management:\par 11/23/21 - underwent 2 site right breast core biopsy:   the right breast showed 12:00 3 cm FN; breast parenchyma with multinucleated giant cells, foreign body material and scar formation.  Right breast, 11:00, 13 cm FN showed Invasive moderately differentiated ductal carcinoma, Steedman score 6 /9, measuring 0.6 cm, ER+ 80%, PgR + 95%, HER-2:Negative, Ductal carcinoma in situ, solid pattern with intermediate grade nuclear atypia, No Lymphovascular permeation seen. \par \par 2/3/22 - underwent Right Breast upper outer quadrant lumpectomy with sentinel lymph node biopsy.  Invasive moderately differentiated ductal carcinoma, no special type, 0.8 cm greatest dimension, without calcifications or necrosis.  Surgical margins of excision are indeterminate due to specimen fragmentation at tumor margin.  LN negative (6 sentinel nodes removed).\par Also underwent right breast subareolar mass, excisional biopsy which revealed Breast with focal dense fibrosis, fat necrosis and hemosiderin deposition consistent with organizing biopsy site scar. Adjacent areas consisted of foreign body giant cell reaction and chronic inflammation.  \par \par 3/23/22 - saw Dr. JODIE Brooks (oncology) in consultation, no indication for chemotherapy, referred to radiation and to start on Anastrazole after radiation.  \par \par 5/2022 - planned Radiation therapy to Right Partial Breast 3000 cGy in 5 fx \par \par 5/18/22 -  VSIM done today ... reinforced skin care. \par \par 5/23/22 - OTV 2/5 fx completed today.  Ms. Alva is doing well but has noticed an increase in tiredness.  She continues to do skin care as directed.  \par Post treatment evaluation appointment is on 7/11/22.  Discharge instructions given and reviewed.

## 2022-05-23 NOTE — REVIEW OF SYSTEMS
[Edema Limbs: Grade 0] : Edema Limbs: Grade 0  [Fatigue: Grade 1 - Fatigue relieved by rest] : Fatigue: Grade 1 - Fatigue relieved by rest [Breast Pain: Grade 0] : Breast Pain: Grade 0 [Pruritus: Grade 0] : Pruritus: Grade 0 [Skin Atrophy: Grade 0] : Skin Atrophy: Grade 0 [Skin Hyperpigmentation: Grade 1 - Hyperpigmentation covering <10% BSA; no psychosocial impact] : Skin Hyperpigmentation: Grade 1 - Hyperpigmentation covering <10% BSA; no psychosocial impact [Skin Induration: Grade 0] : Skin Induration: Grade 0 [Dermatitis Radiation: Grade 1 - Faint erythema or dry desquamation] : Dermatitis Radiation: Grade 1 - Faint erythema or dry desquamation [FreeTextEntry7] : occasional "twinges"   [FreeTextEntry6] : doing skin care twice a day

## 2022-06-08 ENCOUNTER — NON-APPOINTMENT (OUTPATIENT)
Age: 67
End: 2022-06-08

## 2022-06-13 ENCOUNTER — APPOINTMENT (OUTPATIENT)
Dept: RADIOLOGY | Facility: IMAGING CENTER | Age: 67
End: 2022-06-13
Payer: MEDICARE

## 2022-06-13 ENCOUNTER — OUTPATIENT (OUTPATIENT)
Dept: OUTPATIENT SERVICES | Facility: HOSPITAL | Age: 67
LOS: 1 days | End: 2022-06-13
Payer: MEDICARE

## 2022-06-13 ENCOUNTER — NON-APPOINTMENT (OUTPATIENT)
Age: 67
End: 2022-06-13

## 2022-06-13 DIAGNOSIS — Z00.8 ENCOUNTER FOR OTHER GENERAL EXAMINATION: ICD-10-CM

## 2022-06-13 DIAGNOSIS — Z90.711 ACQUIRED ABSENCE OF UTERUS WITH REMAINING CERVICAL STUMP: Chronic | ICD-10-CM

## 2022-06-13 DIAGNOSIS — K46.9 UNSPECIFIED ABDOMINAL HERNIA WITHOUT OBSTRUCTION OR GANGRENE: Chronic | ICD-10-CM

## 2022-06-13 DIAGNOSIS — Z98.891 HISTORY OF UTERINE SCAR FROM PREVIOUS SURGERY: Chronic | ICD-10-CM

## 2022-06-13 DIAGNOSIS — Z87.828 PERSONAL HISTORY OF OTHER (HEALED) PHYSICAL INJURY AND TRAUMA: Chronic | ICD-10-CM

## 2022-06-13 DIAGNOSIS — Z98.890 OTHER SPECIFIED POSTPROCEDURAL STATES: Chronic | ICD-10-CM

## 2022-06-13 PROCEDURE — 77080 DXA BONE DENSITY AXIAL: CPT | Mod: 26

## 2022-06-13 PROCEDURE — 77080 DXA BONE DENSITY AXIAL: CPT

## 2022-06-17 ENCOUNTER — APPOINTMENT (OUTPATIENT)
Dept: INTERNAL MEDICINE | Facility: CLINIC | Age: 67
End: 2022-06-17
Payer: MEDICARE

## 2022-06-17 VITALS
BODY MASS INDEX: 47.48 KG/M2 | TEMPERATURE: 97.3 F | HEART RATE: 67 BPM | RESPIRATION RATE: 18 BRPM | DIASTOLIC BLOOD PRESSURE: 82 MMHG | SYSTOLIC BLOOD PRESSURE: 144 MMHG | WEIGHT: 285 LBS | OXYGEN SATURATION: 97 % | HEIGHT: 65 IN

## 2022-06-17 PROCEDURE — 99214 OFFICE O/P EST MOD 30 MIN: CPT

## 2022-06-17 RX ORDER — METHYLPREDNISOLONE 4 MG/1
4 TABLET ORAL
Qty: 1 | Refills: 0 | Status: DISCONTINUED | COMMUNITY
Start: 2022-05-17 | End: 2022-06-17

## 2022-06-17 NOTE — REVIEW OF SYSTEMS
[Vision Problems] : vision problems [Back Pain] : back pain [Negative] : Heme/Lymph [Skin Rash] : no skin rash

## 2022-06-17 NOTE — ASSESSMENT
[FreeTextEntry1] : RLE sciatica- improved, cont gabapentin\par DM2- chk fs qd, metformin, chk labs\par HLD- chk labs, cont atorv\par HTN- stable, cont atenolol, losartan, hct\par Glaucoma- f/u with optho, cont drops\par Bilat Knee pain- f/u with ortho\par Chronic Venous Insuff- low Na diet, hct. referred to vasc surg\par Morbid obesity- discussed low calorie diet and exercise as part of a weight loss plan.\par Breast Cancer- cont with RTx and arimidex per surgeon/rad onc/ onc. \par Decr Hearing- ENT f/u\par abd hernia- consider surgery eval\par Anemia- start iron 65mg qd, vit c 500mg qd, referred to GI\par pod eval\par Pt. was encouraged to do all relevant screening tests including but not limited to mammogram, gyn visit, colonoscopy, bone density, annual skin exam.\par \par \par

## 2022-06-17 NOTE — PHYSICAL EXAM
[No Acute Distress] : no acute distress [Well Nourished] : well nourished [Well Developed] : well developed [Well-Appearing] : well-appearing [Normal Sclera/Conjunctiva] : normal sclera/conjunctiva [PERRL] : pupils equal round and reactive to light [EOMI] : extraocular movements intact [Normal Outer Ear/Nose] : the outer ears and nose were normal in appearance [Normal Oropharynx] : the oropharynx was normal [No JVD] : no jugular venous distention [No Lymphadenopathy] : no lymphadenopathy [Supple] : supple [Thyroid Normal, No Nodules] : the thyroid was normal and there were no nodules present [No Respiratory Distress] : no respiratory distress  [No Accessory Muscle Use] : no accessory muscle use [Clear to Auscultation] : lungs were clear to auscultation bilaterally [Normal Rate] : normal rate  [Regular Rhythm] : with a regular rhythm [Normal S1, S2] : normal S1 and S2 [No Murmur] : no murmur heard [No Carotid Bruits] : no carotid bruits [No Abdominal Bruit] : a ~M bruit was not heard ~T in the abdomen [No Varicosities] : no varicosities [Pedal Pulses Present] : the pedal pulses are present [No Edema] : there was no peripheral edema [No Palpable Aorta] : no palpable aorta [No Extremity Clubbing/Cyanosis] : no extremity clubbing/cyanosis [Soft] : abdomen soft [Non Tender] : non-tender [Non-distended] : non-distended [No HSM] : no HSM [Normal Bowel Sounds] : normal bowel sounds [Normal Posterior Cervical Nodes] : no posterior cervical lymphadenopathy [Normal Anterior Cervical Nodes] : no anterior cervical lymphadenopathy [No CVA Tenderness] : no CVA  tenderness [No Spinal Tenderness] : no spinal tenderness [No Joint Swelling] : no joint swelling [Grossly Normal Strength/Tone] : grossly normal strength/tone [Coordination Grossly Intact] : coordination grossly intact [No Focal Deficits] : no focal deficits [Deep Tendon Reflexes (DTR)] : deep tendon reflexes were 2+ and symmetric [Normal Affect] : the affect was normal [Normal Insight/Judgement] : insight and judgment were intact [de-identified] : right sided incisional hernia. large. [de-identified] : ble- edematous/indurated, hyperpigmented . [de-identified] : slow, ambulates with a cane

## 2022-06-17 NOTE — HEALTH RISK ASSESSMENT
[Patient reported bone density results were normal] : Patient reported bone density results were normal [BoneDensityDate] : 2022

## 2022-06-17 NOTE — HISTORY OF PRESENT ILLNESS
[FreeTextEntry1] : The patient is here for a follow up visit to review their medications and chronic medical conditions.\par dm,htn,hld [de-identified] : rle sciatica- improved  but still symptomatic. saw neuro- felt is was partially due to RTx . increased gabapentin to 200mg qd. \par Otherwise feels good. Appet, sleep, bms, voiding, mood all ok. no pain.\par wants a vasc and pod eval.

## 2022-07-03 ENCOUNTER — RX RENEWAL (OUTPATIENT)
Age: 67
End: 2022-07-03

## 2022-07-08 ENCOUNTER — NON-APPOINTMENT (OUTPATIENT)
Age: 67
End: 2022-07-08

## 2022-07-11 ENCOUNTER — OUTPATIENT (OUTPATIENT)
Dept: OUTPATIENT SERVICES | Facility: HOSPITAL | Age: 67
LOS: 1 days | Discharge: HOME | End: 2022-07-11

## 2022-07-11 ENCOUNTER — APPOINTMENT (OUTPATIENT)
Dept: RADIATION ONCOLOGY | Facility: CLINIC | Age: 67
End: 2022-07-11

## 2022-07-11 DIAGNOSIS — Z87.828 PERSONAL HISTORY OF OTHER (HEALED) PHYSICAL INJURY AND TRAUMA: Chronic | ICD-10-CM

## 2022-07-11 DIAGNOSIS — K46.9 UNSPECIFIED ABDOMINAL HERNIA WITHOUT OBSTRUCTION OR GANGRENE: Chronic | ICD-10-CM

## 2022-07-11 DIAGNOSIS — C50.919 MALIGNANT NEOPLASM OF UNSPECIFIED SITE OF UNSPECIFIED FEMALE BREAST: ICD-10-CM

## 2022-07-11 DIAGNOSIS — Z90.711 ACQUIRED ABSENCE OF UTERUS WITH REMAINING CERVICAL STUMP: Chronic | ICD-10-CM

## 2022-07-11 DIAGNOSIS — Z98.890 OTHER SPECIFIED POSTPROCEDURAL STATES: Chronic | ICD-10-CM

## 2022-07-11 DIAGNOSIS — Z98.891 HISTORY OF UTERINE SCAR FROM PREVIOUS SURGERY: Chronic | ICD-10-CM

## 2022-07-15 ENCOUNTER — APPOINTMENT (OUTPATIENT)
Dept: RADIATION ONCOLOGY | Facility: CLINIC | Age: 67
End: 2022-07-15

## 2022-07-15 VITALS
HEIGHT: 65 IN | HEART RATE: 66 BPM | OXYGEN SATURATION: 97 % | DIASTOLIC BLOOD PRESSURE: 82 MMHG | TEMPERATURE: 97.16 F | RESPIRATION RATE: 18 BRPM | SYSTOLIC BLOOD PRESSURE: 151 MMHG

## 2022-07-15 PROCEDURE — 99024 POSTOP FOLLOW-UP VISIT: CPT

## 2022-07-15 NOTE — HISTORY OF PRESENT ILLNESS
[FreeTextEntry1] : Ms. Alva is a 67 year old female who is being seen for post treatment evaluation. \par \par Diagnosis: pT1b pN0 pMx Right Breast, upper outer quadrant, Invasive moderately differentiated ductal carcinoma, no special type, 0.8 cm greatest dimension, without calcifications or necrosis.  Surgical margins of excision are indeterminate due to specimen fragmentation at tumor margin.  LN negative.   ER + (80%)  AL + (95%) HER2 - \par Oncotype Dx score  4 \par \par Oncologic Management:\par 11/23/21 - underwent 2 site right breast core biopsy:   the right breast showed 12:00 3 cm FN; breast parenchyma with multinucleated giant cells, foreign body material and scar formation.  Right breast, 11:00, 13 cm FN showed Invasive moderately differentiated ductal carcinoma, [place] score 6 /9, measuring 0.6 cm, ER+ 80%, PgR + 95%, HER-2:Negative, Ductal carcinoma in situ, solid pattern with intermediate grade nuclear atypia, No Lymphovascular permeation seen. \par \par 2/3/22 - underwent Right Breast upper outer quadrant lumpectomy with sentinel lymph node biopsy.  Invasive moderately differentiated ductal carcinoma, no special type, 0.8 cm greatest dimension, without calcifications or necrosis.  Surgical margins of excision are indeterminate due to specimen fragmentation at tumor margin.  LN negative (6 sentinel nodes removed).\par Also underwent right breast subareolar mass, excisional biopsy which revealed Breast with focal dense fibrosis, fat necrosis and hemosiderin deposition consistent with organizing biopsy site scar. Adjacent areas consisted of foreign body giant cell reaction and chronic inflammation.  \par \par 3/23/22 - saw Dr. JODIE Brooks (oncology) in consultation, no indication for chemotherapy, referred to radiation and to start on Anastrazole after radiation.  \par \par 5/2022 - 5/31/22: received Radiation therapy to Right Partial Breast 3000 cGy in 5 fx \par \par 7/15/22 - presents for post treatment evaluation. Reports intermittent twinge pain and dry desquamation. Denies discharge or any palpable mass. started on Anastrzole.\par \par

## 2022-07-15 NOTE — PHYSICAL EXAM
[No UE Edema] : there is no upper extremity edema [de-identified] : hyperpigmentation treated site improving

## 2022-08-24 ENCOUNTER — APPOINTMENT (OUTPATIENT)
Dept: VASCULAR SURGERY | Facility: CLINIC | Age: 67
End: 2022-08-24

## 2022-08-24 VITALS
HEIGHT: 65 IN | WEIGHT: 285 LBS | DIASTOLIC BLOOD PRESSURE: 78 MMHG | TEMPERATURE: 208.04 F | BODY MASS INDEX: 47.48 KG/M2 | HEART RATE: 70 BPM | SYSTOLIC BLOOD PRESSURE: 144 MMHG

## 2022-08-24 PROCEDURE — 93970 EXTREMITY STUDY: CPT

## 2022-08-24 PROCEDURE — 99213 OFFICE O/P EST LOW 20 MIN: CPT

## 2022-08-24 NOTE — HISTORY OF PRESENT ILLNESS
[FreeTextEntry1] : 67y F w hx of R breast cancer, umbilical hernia repair, DM2, HTN, osteoarthritis, referred by PCP for bilateral lower extremities edema. Patient was last seen by Dr. Garcia in 09/2020 and was diagnosed with bilateral lower extremity chronic venous insufficiency with associated lipodermatosclerosis and has not followed with Vascular Surgery since then. Patient was advised to lose weight and was advised to use support hose and topical triamcinolone 0.1% at that time. Patient complains of persistent b/l LE edema despite following the recommendations. Patient presents today for reevaluation and further recommendations. [de-identified] : 67y F w hx of R breast cancer, umbilical hernia repair, DM2, HTN, osteoarthritis, referred by PCP for bilateral lower extremities edema, with known\par chronic venous insufficiency with associated lipodermatosclerosis. Patient complains of persistent b/l LE edema despite following conservative\par management Patient presents today for reevaluation and further recommendations.

## 2022-08-24 NOTE — ASSESSMENT
[FreeTextEntry1] : 67y F w hx of R breast cancer, umbilical hernia repair, DM2, HTN, osteoarthritis, referred by PCP for bilateral lower extremities edema, with known chronic venous insufficiency with associated lipodermatosclerosis. Patient complains of persistent b/l LE edema despite following conservative management Patient presents today for reevaluation and further recommendations.\par \par Duplex shows no dvts, no svts, no reflux\par no arterial or venous insufficiency found to explain pt's symptoms\par LE elevation and compression\par weight loss\par f/up in 1y

## 2022-08-24 NOTE — PHYSICAL EXAM
[1+] : left 1+ [Ankle Swelling (On Exam)] : present [Ankle Swelling Bilaterally] : bilaterally  [Calm] : calm [de-identified] : NAD [de-identified] : NC/AT [de-identified] : Supple, no JVD [de-identified] : Nonlabored breathing [de-identified] : Regular rate [de-identified] : Soft, distended due to body habitus, nontender [de-identified] : Deferred [de-identified] : Deferred [de-identified] : Moves all 4 extremities spontaneously [de-identified] : Alert and oriented x3 [de-identified] : Calm

## 2022-08-26 ENCOUNTER — APPOINTMENT (OUTPATIENT)
Dept: PULMONOLOGY | Facility: CLINIC | Age: 67
End: 2022-08-26

## 2022-08-26 VITALS
WEIGHT: 285 LBS | HEIGHT: 65 IN | OXYGEN SATURATION: 95 % | DIASTOLIC BLOOD PRESSURE: 88 MMHG | SYSTOLIC BLOOD PRESSURE: 172 MMHG | HEART RATE: 67 BPM | TEMPERATURE: 208.76 F | BODY MASS INDEX: 47.48 KG/M2

## 2022-08-26 DIAGNOSIS — M19.90 UNSPECIFIED OSTEOARTHRITIS, UNSPECIFIED SITE: ICD-10-CM

## 2022-08-26 PROCEDURE — 94010 BREATHING CAPACITY TEST: CPT

## 2022-08-26 PROCEDURE — 99204 OFFICE O/P NEW MOD 45 MIN: CPT | Mod: 25

## 2022-08-26 NOTE — PROCEDURE
[FreeTextEntry1] : Spirometry demonstrates mixed obstructive restrictive ventilatory defect variable effort

## 2022-08-26 NOTE — ASSESSMENT
[FreeTextEntry1] : Based on history and physical the patient has a high likelihood of having obstructive sleep apnea. Further assessment by sleep testing is recommended. There is no contraindication to a home sleep study. We will therefore proceed to two night home apnea sleep study for further assessment.\par Should start maintenance inhaler for asthma Flovent ordered

## 2022-08-26 NOTE — PHYSICAL EXAM
[Normal Appearance] : normal appearance [No Neck Mass] : no neck mass [Normal Rate/Rhythm] : normal rate/rhythm [Normal S1, S2] : normal s1, s2 [No Murmurs] : no murmurs [No Resp Distress] : no resp distress [Clear to Auscultation Bilaterally] : clear to auscultation bilaterally [No Abnormalities] : no abnormalities [Benign] : benign [Normal Gait] : normal gait [No Clubbing] : no clubbing [No Cyanosis] : no cyanosis [No Edema] : no edema [FROM] : FROM [Normal Color/ Pigmentation] : normal color/ pigmentation [No Focal Deficits] : no focal deficits [Oriented x3] : oriented x3 [Normal Affect] : normal affect [TextBox_2] : Obese female

## 2022-08-26 NOTE — HISTORY OF PRESENT ILLNESS
[Never] : never [TextBox_4] : Patient here for sleep apnea assessment.  Diagnosed with sleep apnea a number of years ago use CPAP last usage about 5 years ago she has since discarded her machine.  She reports daytime sleepiness and nonrestorative sleep she discarded her machine because it was not helping her.  She is currently being assessed for endoscopic evaluation and anesthesia safety.  She has a history of asthma she uses a rescue inhaler about once a day she does not have a maintenance inhaler.  She denies chronic cough sputum or wheezing at the current time.  She reports daytime sleepiness and nonrestorative sleep

## 2022-08-31 ENCOUNTER — APPOINTMENT (OUTPATIENT)
Dept: VASCULAR SURGERY | Facility: CLINIC | Age: 67
End: 2022-08-31

## 2022-09-12 ENCOUNTER — RX RENEWAL (OUTPATIENT)
Age: 67
End: 2022-09-12

## 2022-09-15 ENCOUNTER — OUTPATIENT (OUTPATIENT)
Dept: OUTPATIENT SERVICES | Facility: HOSPITAL | Age: 67
LOS: 1 days | Discharge: HOME | End: 2022-09-15

## 2022-09-15 DIAGNOSIS — C50.919 MALIGNANT NEOPLASM OF UNSPECIFIED SITE OF UNSPECIFIED FEMALE BREAST: ICD-10-CM

## 2022-09-15 DIAGNOSIS — Z98.891 HISTORY OF UTERINE SCAR FROM PREVIOUS SURGERY: Chronic | ICD-10-CM

## 2022-09-15 DIAGNOSIS — K46.9 UNSPECIFIED ABDOMINAL HERNIA WITHOUT OBSTRUCTION OR GANGRENE: Chronic | ICD-10-CM

## 2022-09-15 DIAGNOSIS — Z90.711 ACQUIRED ABSENCE OF UTERUS WITH REMAINING CERVICAL STUMP: Chronic | ICD-10-CM

## 2022-09-15 DIAGNOSIS — Z87.828 PERSONAL HISTORY OF OTHER (HEALED) PHYSICAL INJURY AND TRAUMA: Chronic | ICD-10-CM

## 2022-09-15 DIAGNOSIS — Z98.890 OTHER SPECIFIED POSTPROCEDURAL STATES: Chronic | ICD-10-CM

## 2022-09-16 ENCOUNTER — APPOINTMENT (OUTPATIENT)
Dept: HEMATOLOGY ONCOLOGY | Facility: CLINIC | Age: 67
End: 2022-09-16

## 2022-09-16 ENCOUNTER — APPOINTMENT (OUTPATIENT)
Dept: INTERNAL MEDICINE | Facility: CLINIC | Age: 67
End: 2022-09-16

## 2022-09-16 VITALS
DIASTOLIC BLOOD PRESSURE: 84 MMHG | OXYGEN SATURATION: 96 % | SYSTOLIC BLOOD PRESSURE: 156 MMHG | WEIGHT: 279 LBS | HEART RATE: 70 BPM | TEMPERATURE: 209.12 F | HEIGHT: 65 IN | BODY MASS INDEX: 46.48 KG/M2 | RESPIRATION RATE: 18 BRPM

## 2022-09-16 VITALS
OXYGEN SATURATION: 97 % | HEIGHT: 65 IN | HEART RATE: 60 BPM | WEIGHT: 269.18 LBS | BODY MASS INDEX: 44.85 KG/M2 | RESPIRATION RATE: 16 BRPM | TEMPERATURE: 97.5 F | SYSTOLIC BLOOD PRESSURE: 132 MMHG | DIASTOLIC BLOOD PRESSURE: 74 MMHG

## 2022-09-16 PROCEDURE — 99214 OFFICE O/P EST MOD 30 MIN: CPT

## 2022-09-16 PROCEDURE — 36415 COLL VENOUS BLD VENIPUNCTURE: CPT

## 2022-09-16 PROCEDURE — 99214 OFFICE O/P EST MOD 30 MIN: CPT | Mod: 25

## 2022-09-16 PROCEDURE — G0008: CPT

## 2022-09-16 PROCEDURE — 90662 IIV NO PRSV INCREASED AG IM: CPT

## 2022-09-16 RX ORDER — AZELASTINE HYDROCHLORIDE AND FLUTICASONE PROPIONATE 137; 50 UG/1; UG/1
137-50 SPRAY, METERED NASAL
Qty: 1 | Refills: 5 | Status: ACTIVE | COMMUNITY
Start: 2022-09-16 | End: 1900-01-01

## 2022-09-16 NOTE — ASSESSMENT
[FreeTextEntry1] : RLE sciatica- improved, cont gabapentin\par DM2- chk fs qd, metformin, chk labs\par   optho- 2022-sept- no retinopathy\par   Feet- no sores, occasional mild neuropathy.\par HLD- chk labs, cont atorv\par HTN- stable, cont atenolol, losartan, hct\par Glaucoma- f/u with optho, cont drops\par Bilat Knee pain- f/u with ortho\par Chronic Venous Insuff- low Na diet, hct.saw vasc. no tx at this time\par Morbid obesity- discussed low calorie diet and exercise as part of a weight loss plan.\par Breast Cancer- cont with RTx and arimidex per surgeon/rad onc/ onc. \par Decr Hearing- ENT f/u\par abd hernia- consider surgery eval.\par Anemia- t iron 65mg qd, vit c 500mg qd, referred to GI, rpt labs\par Thyroid nodule- rpt sono, last done 2017, no susp features\par pod eval\par Pt. was encouraged to do all relevant screening tests including but not limited to mammogram, gyn visit, colonoscopy, bone density, annual skin exam.\par Reviewed all interim consultations, labs and radiological studies.\par \par \par \par

## 2022-09-16 NOTE — HISTORY OF PRESENT ILLNESS
[FreeTextEntry1] : The patient is here for a follow up visit to review their medications and chronic medical conditions.\par dm2 [de-identified] : Here for follow up visit. Doing well. Appetitie, sleep,bms,voiding, mood all ok. \par Chart reviewed.\par

## 2022-09-16 NOTE — PHYSICAL EXAM
[No Acute Distress] : no acute distress [Well Nourished] : well nourished [Well Developed] : well developed [Well-Appearing] : well-appearing [Normal Sclera/Conjunctiva] : normal sclera/conjunctiva [PERRL] : pupils equal round and reactive to light [EOMI] : extraocular movements intact [Normal Outer Ear/Nose] : the outer ears and nose were normal in appearance [Normal Oropharynx] : the oropharynx was normal [No JVD] : no jugular venous distention [No Lymphadenopathy] : no lymphadenopathy [Supple] : supple [No Respiratory Distress] : no respiratory distress  [No Accessory Muscle Use] : no accessory muscle use [Clear to Auscultation] : lungs were clear to auscultation bilaterally [Normal Rate] : normal rate  [Regular Rhythm] : with a regular rhythm [Normal S1, S2] : normal S1 and S2 [No Murmur] : no murmur heard [No Carotid Bruits] : no carotid bruits [No Abdominal Bruit] : a ~M bruit was not heard ~T in the abdomen [No Varicosities] : no varicosities [Pedal Pulses Present] : the pedal pulses are present [No Edema] : there was no peripheral edema [No Palpable Aorta] : no palpable aorta [No Extremity Clubbing/Cyanosis] : no extremity clubbing/cyanosis [Soft] : abdomen soft [Non Tender] : non-tender [Non-distended] : non-distended [No HSM] : no HSM [Normal Bowel Sounds] : normal bowel sounds [Normal Posterior Cervical Nodes] : no posterior cervical lymphadenopathy [Normal Anterior Cervical Nodes] : no anterior cervical lymphadenopathy [No CVA Tenderness] : no CVA  tenderness [No Spinal Tenderness] : no spinal tenderness [No Joint Swelling] : no joint swelling [Grossly Normal Strength/Tone] : grossly normal strength/tone [Coordination Grossly Intact] : coordination grossly intact [No Focal Deficits] : no focal deficits [Deep Tendon Reflexes (DTR)] : deep tendon reflexes were 2+ and symmetric [Normal Affect] : the affect was normal [Normal Insight/Judgement] : insight and judgment were intact [de-identified] : left thyroid prominence. [de-identified] : right sided incisional hernia. large. [de-identified] : ble- edematous/indurated, hyperpigmented . [de-identified] : slow, ambulates with a cane

## 2022-09-19 LAB
ALBUMIN SERPL ELPH-MCNC: 3.9 G/DL
ALP BLD-CCNC: 111 U/L
ALT SERPL-CCNC: 9 U/L
ANION GAP SERPL CALC-SCNC: 14 MMOL/L
AST SERPL-CCNC: 13 U/L
BASOPHILS # BLD AUTO: 0.04 K/UL
BASOPHILS NFR BLD AUTO: 0.7 %
BILIRUB SERPL-MCNC: 0.4 MG/DL
BUN SERPL-MCNC: 13 MG/DL
CALCIUM SERPL-MCNC: 9.4 MG/DL
CHLORIDE SERPL-SCNC: 103 MMOL/L
CHOLEST SERPL-MCNC: 148 MG/DL
CO2 SERPL-SCNC: 27 MMOL/L
CREAT SERPL-MCNC: 0.46 MG/DL
EGFR: 105 ML/MIN/1.73M2
EOSINOPHIL # BLD AUTO: 0.29 K/UL
EOSINOPHIL NFR BLD AUTO: 4.8 %
ESTIMATED AVERAGE GLUCOSE: 154 MG/DL
GLUCOSE SERPL-MCNC: 125 MG/DL
HBA1C MFR BLD HPLC: 7 %
HCT VFR BLD CALC: 33.1 %
HDLC SERPL-MCNC: 40 MG/DL
HGB BLD-MCNC: 9.8 G/DL
IMM GRANULOCYTES NFR BLD AUTO: 0.3 %
LDLC SERPL CALC-MCNC: 82 MG/DL
LYMPHOCYTES # BLD AUTO: 1.75 K/UL
LYMPHOCYTES NFR BLD AUTO: 29.2 %
MAN DIFF?: NORMAL
MCHC RBC-ENTMCNC: 24.3 PG
MCHC RBC-ENTMCNC: 29.6 GM/DL
MCV RBC AUTO: 82.1 FL
MONOCYTES # BLD AUTO: 0.41 K/UL
MONOCYTES NFR BLD AUTO: 6.8 %
NEUTROPHILS # BLD AUTO: 3.48 K/UL
NEUTROPHILS NFR BLD AUTO: 58.2 %
NONHDLC SERPL-MCNC: 108 MG/DL
PLATELET # BLD AUTO: 213 K/UL
POTASSIUM SERPL-SCNC: 4 MMOL/L
PROT SERPL-MCNC: 6.1 G/DL
RBC # BLD: 4.03 M/UL
RBC # FLD: 16.2 %
SODIUM SERPL-SCNC: 145 MMOL/L
TRIGL SERPL-MCNC: 131 MG/DL
WBC # FLD AUTO: 5.99 K/UL

## 2022-09-27 ENCOUNTER — APPOINTMENT (OUTPATIENT)
Dept: PULMONOLOGY | Facility: CLINIC | Age: 67
End: 2022-09-27

## 2022-09-27 PROCEDURE — 95800 SLP STDY UNATTENDED: CPT

## 2022-09-28 PROCEDURE — 95800 SLP STDY UNATTENDED: CPT

## 2022-10-02 ENCOUNTER — FORM ENCOUNTER (OUTPATIENT)
Age: 67
End: 2022-10-02

## 2022-10-06 NOTE — HISTORY OF PRESENT ILLNESS
[T: ___] : T[unfilled] [N: ___] : N[unfilled] [M: ___] : M[unfilled] [de-identified] : Ms. Debra Alva is a 67 year old female here for an evaluation of breast cancer. Her oncologic history is as follows:\par \par She underwent routine breast imaging on 9/4/2021( BIRADS 0) which showed a 9 mm spiculated mass in the upper central right breast corresponding with the sonographic finding of an irregular hypoechoic at the 12:00, 3 cm FN right breast for which additional targeted imaging of right breast and axilla is rec. She underwent additional US imaging of the right breast on 11/12/2021 (BIRADS 5) which showed irregular hypoechoic mass measuring 5 mm in the right breast 12:00 for which US guided core biopsy is recommended \par \par  She underwent  2 site right breast core biopsy on 11/23/2021 the right breast showed 12 O'clock 3cmfn; breast parenchyma with multinucleated giant cells, foreign body material and scar formation; right breast, 11 O'clock, 13cmfn showed invasive moderately differentiated ductal carcinoma, Tiffanie score 6 /9, measuring 0.6 cm, ER+ 80%,  PgR + 95%, HER-2:Negative, Ductal carcinoma in situ, solid pattern with intermediate grade nuclear atypia, No Lymphovascular permeation seen\par The results at the first site, right breast 12:00 axis, 3 cm from the nipple are BENIGN AND DISCORDANT.\par The results at the second site, right breast 11:00 axis, 13 cm from the nipple, demonstrating sonographic mammographic concordance, are CONCORDANT.\par \par She underwent  Right breast, upper outer quadrant, lumpectomy/ sentinel lymph node biopsy on 2/3/2022 which revealed invasive moderately differentiated ductal carcinoma,NST,  Grant grad 2,  measuring  0.8 cm, Margin were negative.  Lymphovascular invasion was not seen.No DCIS was noted in this specimen but  solid, intermediate grade DCIS noted inprior core biopsy. SIX sentinel lymph node were removed and 0/6 were negative for metastasis.\par \par  She also underwent right breast subareolar mass, excisional biopsy which revealed Breast with focal dense fibrosis, fat necrosis and hemosiderin deposition consistent with organizing biopsy site scar. Adjacent areas consisted of foreign body giant cell reaction and chronic inflammation.\par \par ODX 4\par  \par She uses a cane and walker at baseline at home. Here in wheel chair. She has weak knees 2/2 arthritis \par \par 12/2019 : DEXA normal [de-identified] : In summary, Ms. JOVITA WALLACE is a 67 year old postmenopausal female with stage IA (T1b, N0, Mx) ER positive, NE positive, HER-2/rodrigo negative invasive ductal carcinoma of the right breast. She is status post lumpectomy on 2/23/2022. ODX 4. RT completed 5/2022. Anastrozole started 6/2022. \par \par Takes Anastrozole daily, good compliance. She denies aches/pain, hot flashes, vag dryness, excessive fatigue, GI s/e, hair loss. She is active, no change in energy, wt or appetite. \par mammogram - 9/2021\par She uses a cane and walker at baseline at home. Here in wheel chair. She has weak knees 2/2 arthritis \par 12/2019, 3/2022 : DEXA normal

## 2022-10-06 NOTE — ASSESSMENT
[FreeTextEntry1] : In summary, Ms. JOVITA WALLACE is a 67 year old postmenopausal female with stage IA (T1b, N0, Mx) ER positive, TN positive, HER-2/rodrigo negative invasive ductal carcinoma of the right breast. She is status post lumpectomy on 2/23/2022 and is scheduled to meet with Dr Arellano for radiation Oncology consultation. ODX 4. RT completed 5/2022. Anastrozole started 6/2022. \par \par 1. Breast ca- Ms. JOVITA WALLACE  is tolerating AI well, good compliance. She has mild side effects from the treatment. Continue treatment x 5-10 yrs. Annual breast imaging ordered\par 2.  Concern for worsening bone density and fractures due to anastrozole. Rec to  continue calcium and vit D. DEXA 1-2 yrs. \par 3.  High cholesterol/CAD risk factors: Concern for worsening cholesterol/CAD risk factors due to anastrozole. Pt is on Lipitor. Lipid profile annually. Life style modifications d/w her.\par \par 	\par RTC 6 m\par \par

## 2022-10-06 NOTE — PHYSICAL EXAM
[Ambulatory and capable of all self care but unable to carry out any work activities] : Status 2- Ambulatory and capable of all self care but unable to carry out any work activities. Up and about more than 50% of waking hours [Obese] : obese [Normal] : affect appropriate [de-identified] : healing right breast

## 2022-10-13 ENCOUNTER — APPOINTMENT (OUTPATIENT)
Dept: SURGERY | Facility: CLINIC | Age: 67
End: 2022-10-13

## 2022-10-13 ENCOUNTER — RESULT REVIEW (OUTPATIENT)
Age: 67
End: 2022-10-13

## 2022-10-13 VITALS
HEART RATE: 81 BPM | OXYGEN SATURATION: 95 % | HEIGHT: 65 IN | BODY MASS INDEX: 44.48 KG/M2 | WEIGHT: 267 LBS | SYSTOLIC BLOOD PRESSURE: 154 MMHG | TEMPERATURE: 97.3 F | DIASTOLIC BLOOD PRESSURE: 79 MMHG

## 2022-10-13 PROCEDURE — 99203 OFFICE O/P NEW LOW 30 MIN: CPT

## 2022-10-13 PROCEDURE — 99213 OFFICE O/P EST LOW 20 MIN: CPT

## 2022-10-13 NOTE — HISTORY OF PRESENT ILLNESS
[de-identified] : The patient is a 67 year old woman with a history of recurrent ventral hernia s/p 2 repairs (1999, 2007) presenting to the office for evaluation of ventral wall hernia repair. The patient first noticed the hernia 20 years ago, at which point she underwent repair at [facility]. The hernia recurred 6 years later, at which point she underwent another repair at [facility], which was complicated by enterotomy and one month hospitalization. The patient reports that the hernia recurred 6 months later and has continued to grow in size. The patient reports occasional "gas pain", constipation, and diarrhea but denies any vomiting or nausea. The patient was referred to this office by her PCP. \par \par The patient has a past medical history of breast cancer, diabetes, hypertension, osteoarthritis, sleep apnea, and COVID (2020) complicated by hospitalization and neuropathy. \par The patient has a significant past surgical history including the 2 past hernia repairs, lumpectomy for breast cancer (6 months ago), removal of breast calcification (5 years ago), and pars plana vitrectomy for macular hole (10 years ago).

## 2022-10-13 NOTE — PLAN
[FreeTextEntry1] : 1) Recurrent ventral wall hernia \par Patient will go for an updated CT scan and will follow-up in office after imaging results to further discuss surgical options. All questions were answered, patient expressed understanding.

## 2022-10-13 NOTE — ASSESSMENT
[FreeTextEntry1] : The patient is a 67 year old woman presenting to the office for evaluation of ventral wall hernia repair s/p 2 previous repairs.

## 2022-10-13 NOTE — PHYSICAL EXAM
[Normal Breath Sounds] : Normal breath sounds [Normal Rate and Rhythm] : normal rate and rhythm [Alert] : alert [Oriented to Person] : oriented to person [Oriented to Place] : oriented to place [Oriented to Time] : oriented to time [Calm] : calm [de-identified] : comfortable, not in distress [de-identified] : large reducible ventral hernia

## 2022-10-26 ENCOUNTER — APPOINTMENT (OUTPATIENT)
Dept: OTOLARYNGOLOGY | Facility: CLINIC | Age: 67
End: 2022-10-26

## 2022-10-27 ENCOUNTER — APPOINTMENT (OUTPATIENT)
Dept: ULTRASOUND IMAGING | Facility: IMAGING CENTER | Age: 67
End: 2022-10-27

## 2022-10-27 ENCOUNTER — APPOINTMENT (OUTPATIENT)
Dept: RADIOLOGY | Facility: IMAGING CENTER | Age: 67
End: 2022-10-27

## 2022-10-27 ENCOUNTER — APPOINTMENT (OUTPATIENT)
Dept: CT IMAGING | Facility: IMAGING CENTER | Age: 67
End: 2022-10-27

## 2022-10-27 ENCOUNTER — OUTPATIENT (OUTPATIENT)
Dept: OUTPATIENT SERVICES | Facility: HOSPITAL | Age: 67
LOS: 1 days | End: 2022-10-27
Payer: MEDICARE

## 2022-10-27 DIAGNOSIS — Z87.828 PERSONAL HISTORY OF OTHER (HEALED) PHYSICAL INJURY AND TRAUMA: Chronic | ICD-10-CM

## 2022-10-27 DIAGNOSIS — K46.9 UNSPECIFIED ABDOMINAL HERNIA WITHOUT OBSTRUCTION OR GANGRENE: Chronic | ICD-10-CM

## 2022-10-27 DIAGNOSIS — E04.1 NONTOXIC SINGLE THYROID NODULE: ICD-10-CM

## 2022-10-27 DIAGNOSIS — Z98.890 OTHER SPECIFIED POSTPROCEDURAL STATES: Chronic | ICD-10-CM

## 2022-10-27 DIAGNOSIS — Z90.711 ACQUIRED ABSENCE OF UTERUS WITH REMAINING CERVICAL STUMP: Chronic | ICD-10-CM

## 2022-10-27 DIAGNOSIS — K43.9 VENTRAL HERNIA WITHOUT OBSTRUCTION OR GANGRENE: ICD-10-CM

## 2022-10-27 DIAGNOSIS — Z98.891 HISTORY OF UTERINE SCAR FROM PREVIOUS SURGERY: Chronic | ICD-10-CM

## 2022-10-27 PROCEDURE — 74176 CT ABD & PELVIS W/O CONTRAST: CPT

## 2022-10-27 PROCEDURE — 73110 X-RAY EXAM OF WRIST: CPT

## 2022-10-27 PROCEDURE — 76536 US EXAM OF HEAD AND NECK: CPT

## 2022-10-27 PROCEDURE — 74176 CT ABD & PELVIS W/O CONTRAST: CPT | Mod: 26,MH

## 2022-10-27 PROCEDURE — 76536 US EXAM OF HEAD AND NECK: CPT | Mod: 26

## 2022-10-27 PROCEDURE — 73110 X-RAY EXAM OF WRIST: CPT | Mod: 26,RT

## 2022-11-05 ENCOUNTER — RESULT REVIEW (OUTPATIENT)
Age: 67
End: 2022-11-05

## 2022-11-06 ENCOUNTER — RX RENEWAL (OUTPATIENT)
Age: 67
End: 2022-11-06

## 2022-11-09 ENCOUNTER — APPOINTMENT (OUTPATIENT)
Dept: OTOLARYNGOLOGY | Facility: CLINIC | Age: 67
End: 2022-11-09

## 2022-11-09 VITALS
SYSTOLIC BLOOD PRESSURE: 159 MMHG | TEMPERATURE: 97.3 F | HEIGHT: 65 IN | BODY MASS INDEX: 44.48 KG/M2 | DIASTOLIC BLOOD PRESSURE: 67 MMHG | WEIGHT: 267 LBS

## 2022-11-09 PROCEDURE — 99204 OFFICE O/P NEW MOD 45 MIN: CPT | Mod: 25

## 2022-11-09 PROCEDURE — 31231 NASAL ENDOSCOPY DX: CPT

## 2022-11-09 NOTE — CONSULT LETTER
[Dear  ___] : Dear  [unfilled], [( Thank you for referring [unfilled] for consultation for _____ )] : Thank you for referring [unfilled] for consultation for [unfilled] [Please see my note below.] : Please see my note below. [Sincerely,] : Sincerely, [FreeTextEntry3] : Miguel Horton MD\par Sinus & Endoscopic Skull Base Surgery\par Department of Otolaryngology- Head & Neck Surgery\par [facility]\par [facility]\par \par Phone: [phone]\par Fax: [phone]\par

## 2022-11-09 NOTE — REASON FOR VISIT
[Initial Consultation] : an initial consultation for [FreeTextEntry2] : referred by Dr Joshua Vásquez, PCP for chronic rhinitis

## 2022-11-09 NOTE — HISTORY OF PRESENT ILLNESS
[de-identified] : 67 year old female presents for chronic rhinitis \par Referred by Dr Joshua Vásquez, PCP \par 5+ year of near constant anterior rhinorrhea and PND, intermittent frontal pain and pressure\par Clear and thick in nature\par Denies nasal congestion, epistaxis, recurrent sinus infections\par Reduced sense of smell since COVID+2020\par Previously used Flonase without relief \par Intermittent left otalgia and right ear pressure, concerns for bilateral hearing loss \par Denies otorrhea, ear infections\par Denies scans of sinuses\par No use of OTC allergy medications \par \par PMH: Asthma, MARTHA- planned to get CPAP next week, hx Breast Cancer s/p surgery, chemo and RT, DM, peripheral neuropathy\par No AC\par \par PMH: Breast Cancer, HTN, HLD, MARTHA, Glaucoma, DM, Asthma \par PSH: Cataract surgery, hysterectomy, R lumpectomy & lymph node, hernia repair

## 2022-11-18 ENCOUNTER — APPOINTMENT (OUTPATIENT)
Dept: PULMONOLOGY | Facility: CLINIC | Age: 67
End: 2022-11-18

## 2022-11-18 VITALS — SYSTOLIC BLOOD PRESSURE: 166 MMHG | HEART RATE: 84 BPM | OXYGEN SATURATION: 96 % | DIASTOLIC BLOOD PRESSURE: 90 MMHG

## 2022-11-18 PROCEDURE — 99213 OFFICE O/P EST LOW 20 MIN: CPT

## 2022-11-20 NOTE — ASSESSMENT
[FreeTextEntry1] : Results of sleep study reviewed with patient. Treatment options including weight loss oral appliance therapy and PAP therapy were reviewed with patient. After careful review of sleep study patient desire and risk factors recommend initial therapy with PAP. Will order auto titrating device.\par \par Advised telehealth visit 1 week after receiving equipment

## 2022-11-30 ENCOUNTER — APPOINTMENT (OUTPATIENT)
Dept: ULTRASOUND IMAGING | Facility: IMAGING CENTER | Age: 67
End: 2022-11-30

## 2022-12-08 ENCOUNTER — APPOINTMENT (OUTPATIENT)
Dept: SURGERY | Facility: CLINIC | Age: 67
End: 2022-12-08

## 2022-12-08 VITALS
BODY MASS INDEX: 48.82 KG/M2 | TEMPERATURE: 97.5 F | SYSTOLIC BLOOD PRESSURE: 159 MMHG | OXYGEN SATURATION: 98 % | WEIGHT: 293 LBS | HEART RATE: 65 BPM | DIASTOLIC BLOOD PRESSURE: 84 MMHG | HEIGHT: 65 IN

## 2022-12-08 PROCEDURE — 99213 OFFICE O/P EST LOW 20 MIN: CPT

## 2022-12-08 NOTE — HISTORY OF PRESENT ILLNESS
[de-identified] : 67 year old woman with a history of recurrent ventral hernia s/p 2 repairs (1999, 2007) presenting to the office for evaluation of ventral wall hernia repair. The patient first noticed the hernia 20 years ago, at which point she underwent repair at [facility]. The hernia recurred 6 years later, at which point she underwent another repair at [facility], which was complicated by enterotomy and one month hospitalization. The hernia recurred 6 months later and has continued to grow in size. Pt was seen in the office last month, and underwent CT abd/pelv noncon to better evaluate the hernia

## 2022-12-08 NOTE — PHYSICAL EXAM
[No Rash or Lesion] : No rash or lesion [Alert] : alert [Oriented to Person] : oriented to person [Oriented to Place] : oriented to place [Oriented to Time] : oriented to time [Calm] : calm [Abdominal Masses] : No abdominal masses [Abdomen Tenderness] : ~T ~M No abdominal tenderness [de-identified] : NAD [de-identified] : obese, large reducible ventral hernia

## 2022-12-08 NOTE — PLAN
[FreeTextEntry1] : - Pt will be referred to [facility] weight loss program, prior to planning for ventral hernia repair\par - All questions answered, concerns addressed. Pt and her daughter understand and agree with the plans.

## 2022-12-16 ENCOUNTER — APPOINTMENT (OUTPATIENT)
Dept: INTERNAL MEDICINE | Facility: CLINIC | Age: 67
End: 2022-12-16

## 2022-12-16 VITALS
RESPIRATION RATE: 17 BRPM | HEART RATE: 75 BPM | SYSTOLIC BLOOD PRESSURE: 162 MMHG | WEIGHT: 284 LBS | OXYGEN SATURATION: 99 % | BODY MASS INDEX: 47.32 KG/M2 | HEIGHT: 65 IN | TEMPERATURE: 97.9 F | DIASTOLIC BLOOD PRESSURE: 94 MMHG

## 2022-12-16 VITALS — SYSTOLIC BLOOD PRESSURE: 124 MMHG | DIASTOLIC BLOOD PRESSURE: 80 MMHG

## 2022-12-16 DIAGNOSIS — D64.9 ANEMIA, UNSPECIFIED: ICD-10-CM

## 2022-12-16 PROCEDURE — 99215 OFFICE O/P EST HI 40 MIN: CPT

## 2022-12-18 PROBLEM — D64.9 ANEMIA: Status: ACTIVE | Noted: 2022-06-17

## 2022-12-18 LAB
ALBUMIN SERPL ELPH-MCNC: 4.2 G/DL
ALP BLD-CCNC: 126 U/L
ALT SERPL-CCNC: 11 U/L
ANION GAP SERPL CALC-SCNC: 12 MMOL/L
AST SERPL-CCNC: 16 U/L
BASOPHILS # BLD AUTO: 0.03 K/UL
BASOPHILS NFR BLD AUTO: 0.6 %
BILIRUB SERPL-MCNC: 0.3 MG/DL
BUN SERPL-MCNC: 22 MG/DL
CALCIUM SERPL-MCNC: 10 MG/DL
CHLORIDE SERPL-SCNC: 102 MMOL/L
CHOLEST SERPL-MCNC: 129 MG/DL
CO2 SERPL-SCNC: 29 MMOL/L
CREAT SERPL-MCNC: 0.73 MG/DL
EGFR: 90 ML/MIN/1.73M2
EOSINOPHIL # BLD AUTO: 0.09 K/UL
EOSINOPHIL NFR BLD AUTO: 1.7 %
ESTIMATED AVERAGE GLUCOSE: 137 MG/DL
GLUCOSE SERPL-MCNC: 123 MG/DL
HBA1C MFR BLD HPLC: 6.4 %
HCT VFR BLD CALC: 32.9 %
HDLC SERPL-MCNC: 40 MG/DL
HGB BLD-MCNC: 9.6 G/DL
IMM GRANULOCYTES NFR BLD AUTO: 0.6 %
LDLC SERPL CALC-MCNC: 74 MG/DL
LYMPHOCYTES # BLD AUTO: 1.78 K/UL
LYMPHOCYTES NFR BLD AUTO: 33 %
MAN DIFF?: NORMAL
MCHC RBC-ENTMCNC: 23.3 PG
MCHC RBC-ENTMCNC: 29.2 GM/DL
MCV RBC AUTO: 79.9 FL
MONOCYTES # BLD AUTO: 0.4 K/UL
MONOCYTES NFR BLD AUTO: 7.4 %
NEUTROPHILS # BLD AUTO: 3.06 K/UL
NEUTROPHILS NFR BLD AUTO: 56.7 %
NONHDLC SERPL-MCNC: 90 MG/DL
PLATELET # BLD AUTO: 232 K/UL
POTASSIUM SERPL-SCNC: 3.8 MMOL/L
PROT SERPL-MCNC: 6.4 G/DL
RBC # BLD: 4.12 M/UL
RBC # FLD: 16.6 %
SODIUM SERPL-SCNC: 143 MMOL/L
TRIGL SERPL-MCNC: 79 MG/DL
WBC # FLD AUTO: 5.39 K/UL

## 2022-12-18 NOTE — PHYSICAL EXAM
[No Acute Distress] : no acute distress [Well Nourished] : well nourished [Well Developed] : well developed [Well-Appearing] : well-appearing [Normal Sclera/Conjunctiva] : normal sclera/conjunctiva [PERRL] : pupils equal round and reactive to light [EOMI] : extraocular movements intact [Normal Outer Ear/Nose] : the outer ears and nose were normal in appearance [Normal Oropharynx] : the oropharynx was normal [No JVD] : no jugular venous distention [No Lymphadenopathy] : no lymphadenopathy [Supple] : supple [No Respiratory Distress] : no respiratory distress  [No Accessory Muscle Use] : no accessory muscle use [Clear to Auscultation] : lungs were clear to auscultation bilaterally [Normal Rate] : normal rate  [Normal S1, S2] : normal S1 and S2 [Regular Rhythm] : with a regular rhythm [No Murmur] : no murmur heard [No Carotid Bruits] : no carotid bruits [No Abdominal Bruit] : a ~M bruit was not heard ~T in the abdomen [No Varicosities] : no varicosities [Pedal Pulses Present] : the pedal pulses are present [No Edema] : there was no peripheral edema [No Palpable Aorta] : no palpable aorta [No Extremity Clubbing/Cyanosis] : no extremity clubbing/cyanosis [Soft] : abdomen soft [Non Tender] : non-tender [Non-distended] : non-distended [No HSM] : no HSM [Normal Bowel Sounds] : normal bowel sounds [Normal Posterior Cervical Nodes] : no posterior cervical lymphadenopathy [Normal Anterior Cervical Nodes] : no anterior cervical lymphadenopathy [No CVA Tenderness] : no CVA  tenderness [No Spinal Tenderness] : no spinal tenderness [No Joint Swelling] : no joint swelling [Grossly Normal Strength/Tone] : grossly normal strength/tone [Coordination Grossly Intact] : coordination grossly intact [No Focal Deficits] : no focal deficits [Deep Tendon Reflexes (DTR)] : deep tendon reflexes were 2+ and symmetric [Normal Affect] : the affect was normal [Normal Insight/Judgement] : insight and judgment were intact [de-identified] : left thyroid prominence. [de-identified] : ble- edematous/indurated, hyperpigmented . [de-identified] : right sided incisional hernia. large. [de-identified] : slow, ambulates with a cane

## 2022-12-18 NOTE — HISTORY OF PRESENT ILLNESS
[FreeTextEntry1] : The patient is here for a follow up visit to review their medications and chronic medical conditions.\par dm2 [de-identified] : Here for follow up visit. Doing well. Appetitie, sleep,bms,voiding, mood all ok. \par Reviewed all interim as well as relevant prior consultations, labs and radiological studies.\par \par

## 2022-12-18 NOTE — ASSESSMENT
[FreeTextEntry1] : RLE sciatica- improved, cont gabapentin\par DM2- chk fs qd, metformin, chk labs\par   optho- 2022-sept- no retinopathy\par   Feet- no sores, occasional mild neuropathy.\par HLD- chk labs, cont atorv\par HTN- stable, cont atenolol, losartan, hct\par Glaucoma- f/u with optho, cont drops\par Bilat Knee pain- f/u with ortho\par Chronic Venous Insuff- low Na diet, hct.saw vasc. no tx at this time\par Morbid obesity- discussed low calorie diet and exercise as part of a weight loss plan.\par Breast Cancer- cont with RTx and arimidex per surgeon/rad onc/ onc. \par Decr Hearing- ENT f/u\par abd hernia- saw surg, referred to weight loss program\par Anemia- t iron 65mg qd, vit c 500mg qd, referred to GI, rpt labs\par Thyroid nodule- discussed bx\par pod eval\par Pt. was encouraged to do all relevant screening tests including but not limited to mammogram, gyn visit, colonoscopy, bone density, annual skin exam.\par Total time spent 40 min. ( 25 min was FTF and 15 min was chart review and documentation for a total of 40 min. ).\par \par \par \par \par

## 2022-12-19 ENCOUNTER — NON-APPOINTMENT (OUTPATIENT)
Age: 67
End: 2022-12-19

## 2022-12-20 ENCOUNTER — APPOINTMENT (OUTPATIENT)
Dept: ULTRASOUND IMAGING | Facility: IMAGING CENTER | Age: 67
End: 2022-12-20

## 2022-12-20 ENCOUNTER — APPOINTMENT (OUTPATIENT)
Dept: ORTHOPEDIC SURGERY | Facility: CLINIC | Age: 67
End: 2022-12-20

## 2022-12-20 ENCOUNTER — OUTPATIENT (OUTPATIENT)
Dept: OUTPATIENT SERVICES | Facility: HOSPITAL | Age: 67
LOS: 1 days | End: 2022-12-20
Payer: MEDICARE

## 2022-12-20 ENCOUNTER — RESULT REVIEW (OUTPATIENT)
Age: 67
End: 2022-12-20

## 2022-12-20 VITALS — HEIGHT: 65 IN | BODY MASS INDEX: 47.32 KG/M2 | WEIGHT: 284 LBS

## 2022-12-20 DIAGNOSIS — M65.4 RADIAL STYLOID TENOSYNOVITIS [DE QUERVAIN]: ICD-10-CM

## 2022-12-20 DIAGNOSIS — Z98.890 OTHER SPECIFIED POSTPROCEDURAL STATES: Chronic | ICD-10-CM

## 2022-12-20 DIAGNOSIS — E04.1 NONTOXIC SINGLE THYROID NODULE: ICD-10-CM

## 2022-12-20 DIAGNOSIS — Z87.828 PERSONAL HISTORY OF OTHER (HEALED) PHYSICAL INJURY AND TRAUMA: Chronic | ICD-10-CM

## 2022-12-20 DIAGNOSIS — Z90.711 ACQUIRED ABSENCE OF UTERUS WITH REMAINING CERVICAL STUMP: Chronic | ICD-10-CM

## 2022-12-20 DIAGNOSIS — Z98.891 HISTORY OF UTERINE SCAR FROM PREVIOUS SURGERY: Chronic | ICD-10-CM

## 2022-12-20 DIAGNOSIS — K46.9 UNSPECIFIED ABDOMINAL HERNIA WITHOUT OBSTRUCTION OR GANGRENE: Chronic | ICD-10-CM

## 2022-12-20 PROCEDURE — 10005 FNA BX W/US GDN 1ST LES: CPT

## 2022-12-20 PROCEDURE — 99203 OFFICE O/P NEW LOW 30 MIN: CPT | Mod: 25

## 2022-12-20 PROCEDURE — 88173 CYTOPATH EVAL FNA REPORT: CPT | Mod: 26

## 2022-12-20 PROCEDURE — 73110 X-RAY EXAM OF WRIST: CPT | Mod: RT

## 2022-12-20 PROCEDURE — J3490M: CUSTOM

## 2022-12-20 PROCEDURE — 88173 CYTOPATH EVAL FNA REPORT: CPT

## 2022-12-20 PROCEDURE — 20550 NJX 1 TENDON SHEATH/LIGAMENT: CPT | Mod: RT

## 2022-12-20 PROCEDURE — 88172 CYTP DX EVAL FNA 1ST EA SITE: CPT

## 2022-12-20 NOTE — PHYSICAL EXAM
[de-identified] : R wrist\par Mild swelling\par Tender first dorsal comp\par Decreased thumb and wrist ROM\par +Finklesteins\par \par Xrays neg

## 2022-12-20 NOTE — ASSESSMENT
[FreeTextEntry1] : First dorsal compartment tendon sheath injection was performed because of pain inflammation and stiffness\par Anesthesia: ethyl chloride sprayed topically\par Celestone: An injection of Celestone 1cc\par Lidocaine: An injection of Lidocaine 1% 1cc\par Marcaine: An injection of Marcaine 0.5% 1cc\par \par Patient has tried OTC's including aspirin, Ibuprofen, Aleve etc or prescription NSAIDS, and/or exercises at home and/ or\par physical therapy without satisfactory response.\par After verbal consent using sterile preparation and technique. The risks, benefits, and alternatives to cortisone injection\par were explained in full to the patient. Risks outlined include but are not limited to infection, sepsis, bleeding, scarring, skin\par discoloration, temporary increase in pain, syncopal episode, failure to resolve symptoms, allergic reaction, symptom\par recurrence, and elevation of blood sugar in diabetics. Patient understood the risks. All questions were answered. After\par discussion of options, patient requested an injection. Oral informed consent was obtained and sterile prep was done of the\par injection site. Sterile technique was utilized for the procedure including the preparation of the solutions used for the\par injection. Patient tolerated the procedure well. Advised to ice the injection site this evening.\par Prep with betadine locally to site. Sterile technique used

## 2022-12-21 LAB — NON-GYNECOLOGICAL CYTOLOGY STUDY: SIGNIFICANT CHANGE UP

## 2022-12-22 ENCOUNTER — NON-APPOINTMENT (OUTPATIENT)
Age: 67
End: 2022-12-22

## 2023-01-06 ENCOUNTER — APPOINTMENT (OUTPATIENT)
Dept: PULMONOLOGY | Facility: CLINIC | Age: 68
End: 2023-01-06
Payer: MEDICARE

## 2023-01-06 VITALS — DIASTOLIC BLOOD PRESSURE: 76 MMHG | HEART RATE: 71 BPM | SYSTOLIC BLOOD PRESSURE: 131 MMHG | OXYGEN SATURATION: 96 %

## 2023-01-06 PROCEDURE — 94660 CPAP INITIATION&MGMT: CPT

## 2023-01-07 NOTE — HISTORY OF PRESENT ILLNESS
[TextBox_4] : Follow-up for sleep apnea.  Received S 11 ResMed auto CPAP.  Tolerating poorly.  Not using to any significant extent\par \par Refitted patient for full facemask ResMed\par \par Tolerated well\par \par Advised 1 month follow-up

## 2023-01-09 ENCOUNTER — TRANSCRIPTION ENCOUNTER (OUTPATIENT)
Age: 68
End: 2023-01-09

## 2023-01-12 ENCOUNTER — APPOINTMENT (OUTPATIENT)
Dept: OTOLARYNGOLOGY | Facility: CLINIC | Age: 68
End: 2023-01-12
Payer: MEDICARE

## 2023-01-12 VITALS
SYSTOLIC BLOOD PRESSURE: 141 MMHG | HEART RATE: 89 BPM | HEIGHT: 68 IN | WEIGHT: 293 LBS | BODY MASS INDEX: 44.41 KG/M2 | DIASTOLIC BLOOD PRESSURE: 83 MMHG

## 2023-01-12 PROCEDURE — 99213 OFFICE O/P EST LOW 20 MIN: CPT | Mod: 25

## 2023-01-12 PROCEDURE — 31231 NASAL ENDOSCOPY DX: CPT

## 2023-01-12 NOTE — H&P PST ADULT - SYMPTOMS
Anesthesia Post Evaluation    Patient: Addy Redding    Procedure(s) Performed: Procedure(s) (LRB):  INSERTION, RADIOACTIVE SEED, PROSTATE  ULTRASOUND, RECTAL APPROACH (N/A)    Final Anesthesia Type: general      Patient location during evaluation: PACU  Patient participation: Yes- Able to Participate  Level of consciousness: awake and alert  Post-procedure vital signs: reviewed and stable  Pain management: adequate  Airway patency: patent    PONV status at discharge: No PONV  Anesthetic complications: no      Cardiovascular status: blood pressure returned to baseline and hemodynamically stable  Respiratory status: unassisted and spontaneous ventilation  Hydration status: euvolemic  Follow-up not needed.          Vitals Value Taken Time   /78 01/12/23 0909   Temp 36.4 °C (97.5 °F) 01/12/23 0909   Pulse 69 01/12/23 0913   Resp 16 01/12/23 0913   SpO2 100 % 01/12/23 0913   Vitals shown include unvalidated device data.      No case tracking events are documented in the log.      Pain/Elena Score: Elena Score: 9 (1/12/2023  9:10 AM)         dyspnea

## 2023-01-12 NOTE — HISTORY OF PRESENT ILLNESS
[de-identified] : 67 year old female presents for follow up for chronic rhinitis\par LCV 11/9/22 at which time was started on Flonase/Ipratropium - continues to use as prescribed\par Reports intermittent nasal congestion, PND at night, intermittent facial pain and pressure with B/L otalgia\par Denies anterior rhinnorhea\par Sense of smell is poor - feels this happened after having COVID19 in 2020\par Denies any recent sinus infections \par Switched from CPAP nasal pillows to mask with improved nasal sx\par \par PMH: Breast Cancer, HTN, HLD, MARTHA, Glaucoma, DM, Asthma \par PSH: Cataract surgery, hysterectomy, R lumpectomy & lymph node, hernia repair \par  \par

## 2023-01-23 ENCOUNTER — APPOINTMENT (OUTPATIENT)
Dept: BARIATRICS | Facility: CLINIC | Age: 68
End: 2023-01-23

## 2023-01-31 ENCOUNTER — APPOINTMENT (OUTPATIENT)
Dept: ULTRASOUND IMAGING | Facility: IMAGING CENTER | Age: 68
End: 2023-01-31

## 2023-01-31 ENCOUNTER — RESULT REVIEW (OUTPATIENT)
Age: 68
End: 2023-01-31

## 2023-01-31 ENCOUNTER — APPOINTMENT (OUTPATIENT)
Dept: MAMMOGRAPHY | Facility: IMAGING CENTER | Age: 68
End: 2023-01-31
Payer: MEDICARE

## 2023-01-31 ENCOUNTER — OUTPATIENT (OUTPATIENT)
Dept: OUTPATIENT SERVICES | Facility: HOSPITAL | Age: 68
LOS: 1 days | End: 2023-01-31
Payer: MEDICARE

## 2023-01-31 DIAGNOSIS — Z90.711 ACQUIRED ABSENCE OF UTERUS WITH REMAINING CERVICAL STUMP: Chronic | ICD-10-CM

## 2023-01-31 DIAGNOSIS — K46.9 UNSPECIFIED ABDOMINAL HERNIA WITHOUT OBSTRUCTION OR GANGRENE: Chronic | ICD-10-CM

## 2023-01-31 DIAGNOSIS — Z98.891 HISTORY OF UTERINE SCAR FROM PREVIOUS SURGERY: Chronic | ICD-10-CM

## 2023-01-31 DIAGNOSIS — Z98.890 OTHER SPECIFIED POSTPROCEDURAL STATES: Chronic | ICD-10-CM

## 2023-01-31 DIAGNOSIS — C50.919 MALIGNANT NEOPLASM OF UNSPECIFIED SITE OF UNSPECIFIED FEMALE BREAST: ICD-10-CM

## 2023-01-31 DIAGNOSIS — Z87.828 PERSONAL HISTORY OF OTHER (HEALED) PHYSICAL INJURY AND TRAUMA: Chronic | ICD-10-CM

## 2023-01-31 PROCEDURE — G0279: CPT

## 2023-01-31 PROCEDURE — 77066 DX MAMMO INCL CAD BI: CPT

## 2023-01-31 PROCEDURE — 76641 ULTRASOUND BREAST COMPLETE: CPT | Mod: 50,GA

## 2023-01-31 PROCEDURE — G0279: CPT | Mod: 26

## 2023-01-31 PROCEDURE — 77066 DX MAMMO INCL CAD BI: CPT | Mod: 26

## 2023-01-31 PROCEDURE — 76641 ULTRASOUND BREAST COMPLETE: CPT | Mod: 26,50,GA

## 2023-02-01 ENCOUNTER — APPOINTMENT (OUTPATIENT)
Dept: BARIATRICS | Facility: CLINIC | Age: 68
End: 2023-02-01
Payer: MEDICARE

## 2023-02-01 ENCOUNTER — NON-APPOINTMENT (OUTPATIENT)
Age: 68
End: 2023-02-01

## 2023-02-01 ENCOUNTER — APPOINTMENT (OUTPATIENT)
Dept: BARIATRICS | Facility: CLINIC | Age: 68
End: 2023-02-01

## 2023-02-01 VITALS
SYSTOLIC BLOOD PRESSURE: 123 MMHG | WEIGHT: 291 LBS | DIASTOLIC BLOOD PRESSURE: 71 MMHG | HEART RATE: 61 BPM | BODY MASS INDEX: 44.1 KG/M2 | OXYGEN SATURATION: 95 % | HEIGHT: 68 IN | TEMPERATURE: 98.4 F

## 2023-02-01 PROCEDURE — XXXXX: CPT | Mod: 1L

## 2023-02-01 PROCEDURE — 99215 OFFICE O/P EST HI 40 MIN: CPT

## 2023-02-01 PROCEDURE — G0447 BEHAVIOR COUNSEL OBESITY 15M: CPT | Mod: 59

## 2023-02-05 NOTE — REVIEW OF SYSTEMS
[Patient Intake Form Reviewed] : Patient intake form was reviewed [Negative] : Allergic/Immunologic [MED-ROS-Genituro-FT] : frequent urination [MED-ROS-Musclo-FT] : difficulty ambulating; b/l knee pain [MED-ROS-Integum-FT] : dry skin [MED-ROS-Neuro-FT] : b/l LE burning/tingling

## 2023-02-05 NOTE — HISTORY OF PRESENT ILLNESS
[Improved Health] : Improved health [Improved Mobility] : Improved mobility [Young Adult] : yound adult [Motivation] : motivation [Change in activity level] : change in activity level [3] : 3 [2-5 nights per week] : I use a CPAP machine 2-5 nights per week [I usually sleep 6-8 hours] : I usually sleep 6-8 hours [0] : 0 [None] : none [FreeTextEntry2] : 255 [FreeTextEntry3] : 304 [HTN] : HTN [T2DM] : T2DM [MARTHA] : MARTHA [] : No [FreeTextEntry1] : 67 year old female with T2DM, HTN, HLD, Asthma, Thyroid nodules and Breast cancer presents for evaluation of weight gain. She was referred for medical weight management by Dr. Galvin prior to repair of her large ventral hernia. She feels that she began struggling with her weight after coming to [place] about 30 years ago from [place]. She feels that her weight has made it difficult for her to ambulate. She is now using a wheelchair when outside the house and a walker inside. This has made it difficult for her to perform ADLs and has subsequently impacted her mental health. She does try to cook food but is limited as she feels tired. She reports she feels she has become "lazy". She tends to make foods that are simple to make like sandwich's. \par She has MARTHA and has a CPAP but does not use it every night. \par She has T2DM and takes Metformin 500 mg 1 tab BID. A1C is 6.4. \par She has b/l thyroid nodules and recently had an FNA of larger 4.4 cm nodule which was benign. Second nodule was not biopsied.\par \par Recent labs were reviewed and discussed. \par

## 2023-02-05 NOTE — PHYSICAL EXAM
[Obese, well nourished, in no acute distress] : obese, well nourished, in no acute distress [Normal] : affect appropriate [de-identified] : thyroid mildly enlarged

## 2023-02-05 NOTE — ASSESSMENT
[FreeTextEntry1] : Patient with multiple obesity related comorbidities. We discussed the importance of weight loss in the management of her comorbidities. We discussed the risks of continued excess weight on long term health.  We discussed at length the importance of following a carbohydrate balanced diet and the importance of incorporating protein in meals. We also discussed appropriate alternative food choices. She met with the RD today to discuss dietary changes. We discussed ways she can incorporate exercise into her daily routine given her limitations such as upper body exercise and chair exercise. Will send referral for PT for patient. At least 15 minutes was spent during the visit on obesity counseling. \par \par We discussed the use of Rybelsus to help with T2DM and weight loss. Risks and benefits of therapy with Rybelsus were discussed. The proper way to take the medication, as well as, special considerations while on it were also discussed. \par \par All of her questions were answered. Emotional support provided.\par \par f/u in 6 weeks

## 2023-02-06 NOTE — ASU PREOPERATIVE ASSESSMENT, ADULT ([facility] ONLY) - FALL HARM RISK - FACTORS NURSING JUDGEMENT
Pre-Surgery Instructions:   Medication Instructions   • amLODIPine (NORVASC) 10 mg tablet Take day of surgery  • atenolol (TENORMIN) 25 mg tablet Take day of surgery  • Calcium-Magnesium-Vitamin D (CALCIUM 1200+D3 PO) Last dose 2/6   • fenofibrate (TRICOR) 145 mg tablet Take day of surgery  • fluticasone (FLONASE) 50 mcg/act nasal spray Take day of surgery  • hydrochlorothiazide (HYDRODIURIL) 25 mg tablet Hold day of surgery  • losartan (COZAAR) 100 MG tablet Hold day of surgery  • Multiple Vitamins-Minerals (MULTIVITAMIN WOMEN PO) Last dose 2/6   • potassium chloride (K-DUR,KLOR-CON) 20 mEq tablet Hold day of surgery  • SUMAtriptan (IMITREX) 5 MG/ACT nasal spray Uses PRN- OK to take day of surgery   • topiramate (TOPAMAX) 50 MG tablet Take day of surgery  • valACYclovir (VALTREX) 1,000 mg tablet Uses PRN- OK to take day of surgery     Spoke with pt via phone  Advised hospital location will call with arrival time night before surgery  NPO after midnight the night before surgery, including chewing gum and hard candy  A small sip of water is allowed to take approved medications the morning of surgery  Non-vaccinated patients and visitors need to remain masked at all times while in the hospital     Instructed to leave contacts/jewelery/valuables at home  Ok to wear dentures, glasses and hearing aides into the hospital - they will be removed for surgery  No smoking or alcohol consumption 24 hours prior to surgery  Carb drinks given with instructions, informed pre op call will tell her when to drink AM carb drink  Avoid all Aspirin products, NSAIDS and OTC Vit/Supp  Tylenol ok to take prn  Showering instructions reviewed for night before and morning of surgery using surgical soap or antibacterial soap  Patient verbalized understanding of all of the above, including medication instructions  Yes

## 2023-02-10 ENCOUNTER — APPOINTMENT (OUTPATIENT)
Dept: PULMONOLOGY | Facility: CLINIC | Age: 68
End: 2023-02-10

## 2023-02-24 ENCOUNTER — RX RENEWAL (OUTPATIENT)
Age: 68
End: 2023-02-24

## 2023-02-24 ENCOUNTER — APPOINTMENT (OUTPATIENT)
Dept: PULMONOLOGY | Facility: CLINIC | Age: 68
End: 2023-02-24
Payer: MEDICARE

## 2023-02-24 VITALS
HEIGHT: 68 IN | BODY MASS INDEX: 44.1 KG/M2 | WEIGHT: 291 LBS | HEART RATE: 94 BPM | DIASTOLIC BLOOD PRESSURE: 76 MMHG | SYSTOLIC BLOOD PRESSURE: 148 MMHG | OXYGEN SATURATION: 97 % | TEMPERATURE: 97.7 F

## 2023-02-24 PROCEDURE — 99213 OFFICE O/P EST LOW 20 MIN: CPT

## 2023-02-24 NOTE — HISTORY OF PRESENT ILLNESS
[TextBox_4] : Follow-up for sleep apnea currently on ResMed auto CPAP S11 device.  Feels better when she uses it but having difficulty maintaining compliance.  Noted episode of severe left ear pain after PAP usage.\par \par Compliance download reviewed.  Coming to end of 90-day compliance.  With only minimal usage

## 2023-02-24 NOTE — ASSESSMENT
[FreeTextEntry1] : Patient wants to use CPAP but has not been able to achieve adequate compliance and is having pain from CPAP usage.  Advised in lab titration study to determine optimal CPAP pressure and hopefully improve compliance.

## 2023-03-01 ENCOUNTER — RX ONLY (RX ONLY)
Age: 68
End: 2023-03-01

## 2023-03-01 ENCOUNTER — OFFICE (OUTPATIENT)
Dept: URBAN - METROPOLITAN AREA CLINIC 77 | Facility: CLINIC | Age: 68
Setting detail: OPHTHALMOLOGY
End: 2023-03-01
Payer: MEDICARE

## 2023-03-01 DIAGNOSIS — H35.341: ICD-10-CM

## 2023-03-01 DIAGNOSIS — H35.3114: ICD-10-CM

## 2023-03-01 DIAGNOSIS — E11.9: ICD-10-CM

## 2023-03-01 PROCEDURE — 92250 FUNDUS PHOTOGRAPHY W/I&R: CPT | Performed by: OPHTHALMOLOGY

## 2023-03-01 PROCEDURE — 99204 OFFICE O/P NEW MOD 45 MIN: CPT | Performed by: OPHTHALMOLOGY

## 2023-03-01 ASSESSMENT — CONFRONTATIONAL VISUAL FIELD TEST (CVF)
OD_FINDINGS: FULL
OS_FINDINGS: FULL

## 2023-03-01 ASSESSMENT — REFRACTION_MANIFEST
OS_CYLINDER: -0.50
OD_SPHERE: -1.00
OS_ADD: +2.75
OS_AXIS: 163
OD_ADD: +2.75
OD_CYLINDER: -1.00
OS_VA1: 20/30
OD_VA1: 20/150
OD_AXIS: 168
OS_SPHERE: -1.00

## 2023-03-01 ASSESSMENT — SPHEQUIV_DERIVED
OD_SPHEQUIV: -0.875
OS_SPHEQUIV: -0.5
OD_SPHEQUIV: -1.5
OS_SPHEQUIV: -1.25

## 2023-03-01 ASSESSMENT — AXIALLENGTH_DERIVED
OD_AL: 25.2493
OS_AL: 25.2429
OD_AL: 24.9734
OS_AL: 24.9127

## 2023-03-01 ASSESSMENT — REFRACTION_AUTOREFRACTION
OD_AXIS: 011
OS_CYLINDER: -0.50
OS_SPHERE: -0.25
OD_CYLINDER: -0.75
OD_SPHERE: -0.50
OS_AXIS: 103

## 2023-03-01 ASSESSMENT — VISUAL ACUITY
OD_BCVA: 20/30
OS_BCVA: 20/150

## 2023-03-01 ASSESSMENT — REFRACTION_CURRENTRX
OS_SPHERE: PLANO
OD_OVR_VA: 20/
OD_AXIS: 168
OD_ADD: +2.75
OS_ADD: +2.75
OS_AXIS: 163
OS_CYLINDER: -0.50
OD_CYLINDER: -1.00
OD_SPHERE: -1.00
OS_OVR_VA: 20/

## 2023-03-01 ASSESSMENT — KERATOMETRY
OS_K1POWER_DIOPTERS: 40.50
OS_AXISANGLE_DEGREES: 053
OD_K1POWER_DIOPTERS: 40.50
OD_K2POWER_DIOPTERS: 41.25
OS_K2POWER_DIOPTERS: 40.75
OD_AXISANGLE_DEGREES: 055

## 2023-03-01 ASSESSMENT — TONOMETRY
OD_IOP_MMHG: 10
OS_IOP_MMHG: 12

## 2023-03-14 ENCOUNTER — APPOINTMENT (OUTPATIENT)
Dept: BARIATRICS | Facility: CLINIC | Age: 68
End: 2023-03-14

## 2023-03-14 ENCOUNTER — APPOINTMENT (OUTPATIENT)
Dept: BARIATRICS/WEIGHT MGMT | Facility: CLINIC | Age: 68
End: 2023-03-14
Payer: MEDICARE

## 2023-03-14 ENCOUNTER — NON-APPOINTMENT (OUTPATIENT)
Age: 68
End: 2023-03-14

## 2023-03-14 VITALS
SYSTOLIC BLOOD PRESSURE: 148 MMHG | HEART RATE: 88 BPM | BODY MASS INDEX: 44.41 KG/M2 | DIASTOLIC BLOOD PRESSURE: 85 MMHG | TEMPERATURE: 97.8 F | HEIGHT: 68 IN | WEIGHT: 293 LBS | OXYGEN SATURATION: 98 %

## 2023-03-14 PROCEDURE — 99214 OFFICE O/P EST MOD 30 MIN: CPT

## 2023-03-14 PROCEDURE — XXXXX: CPT | Mod: 1L

## 2023-03-14 PROCEDURE — G0447 BEHAVIOR COUNSEL OBESITY 15M: CPT | Mod: 59

## 2023-03-14 NOTE — ASSESSMENT
[FreeTextEntry1] : Patient with multiple obesity related comorbidities. We discussed the importance of weight loss in the management of her comorbidities. We discussed the risks of continued excess weight on long term health.  We discussed at length the importance of following a carbohydrate balanced diet and the importance of incorporating protein in meals. We also discussed appropriate alternative food choices. She met with the RD today to discuss dietary changes. We discussed ways she can incorporate exercise into her daily routine given her limitations such as upper body exercise and chair exercise. Plans to start PT next month.  At least 15 minutes was spent during the visit on obesity counseling. \par \par Reviewed the black box warning with the patient and her son again in great detail. Reassurance provided that her thyroid nodule FNA was benign. Son wanted to know if there is another medication that can be used. Advised that patient does not have weight loss medication coverage. Advised that GLP1 class would be beneficial for her T2DM and weight loss. Risks and benefits of therapy with Rybelsus were discussed. The proper way to take the medication, as well as, special considerations while on it were also discussed. Sample of Rybelsus 3 mg given. Patient assistance form filled out. \par \par All of her questions were answered. Emotional support provided.\par \par f/u in 8 weeks

## 2023-03-14 NOTE — PHYSICAL EXAM
[Obese, well nourished, in no acute distress] : obese, well nourished, in no acute distress [Normal] : affect appropriate [de-identified] : thyroid mildly enlarged

## 2023-03-14 NOTE — HISTORY OF PRESENT ILLNESS
[FreeTextEntry1] : 68 year old female with T2DM, HTN, HLD, Asthma, Thyroid nodules and Breast cancer presents for follow up of weight gain. She was referred for medical weight management by Dr. Galvin prior to repair of her large ventral hernia. She feels that she began struggling with her weight after coming to [facility] about 30 years ago from [facility] [place]. She feels that her weight has made it difficult for her to ambulate. She is now using a wheelchair when outside the house and a walker inside. This has made it difficult for her to perform ADLs and has subsequently impacted her mental health. She does try to cook food but is limited as she feels tired. She reports she feels she has become "lazy". She tends to make foods that are simple to make like sandwich's. \par She has MARTHA and has a CPAP but does not use it every night. \par She has T2DM and takes Metformin 500 mg 1 tab BID. A1C is 6.4. \par She has b/l thyroid nodules and recently had an FNA of larger 4.4 cm nodule which was benign. Second nodule was not biopsied.\par \par Since her last visit, she has not started Rybelsus. She reports that she was concerned about the black box warning with regards to MTC and her having nodules. Also states that her copay was almost $500. She is interested in applying for the [employer] Patient Assistance Program. She has tried to incorporate some of the changes that were recommended by the RD. She has not been able to increase her activity level.

## 2023-03-15 ENCOUNTER — OUTPATIENT (OUTPATIENT)
Dept: OUTPATIENT SERVICES | Facility: HOSPITAL | Age: 68
LOS: 1 days | Discharge: HOME | End: 2023-03-15

## 2023-03-15 DIAGNOSIS — Z87.828 PERSONAL HISTORY OF OTHER (HEALED) PHYSICAL INJURY AND TRAUMA: Chronic | ICD-10-CM

## 2023-03-15 DIAGNOSIS — K46.9 UNSPECIFIED ABDOMINAL HERNIA WITHOUT OBSTRUCTION OR GANGRENE: Chronic | ICD-10-CM

## 2023-03-15 DIAGNOSIS — Z98.890 OTHER SPECIFIED POSTPROCEDURAL STATES: Chronic | ICD-10-CM

## 2023-03-15 DIAGNOSIS — Z98.891 HISTORY OF UTERINE SCAR FROM PREVIOUS SURGERY: Chronic | ICD-10-CM

## 2023-03-15 DIAGNOSIS — Z90.711 ACQUIRED ABSENCE OF UTERUS WITH REMAINING CERVICAL STUMP: Chronic | ICD-10-CM

## 2023-03-15 DIAGNOSIS — C50.919 MALIGNANT NEOPLASM OF UNSPECIFIED SITE OF UNSPECIFIED FEMALE BREAST: ICD-10-CM

## 2023-03-16 ENCOUNTER — APPOINTMENT (OUTPATIENT)
Dept: INTERNAL MEDICINE | Facility: CLINIC | Age: 68
End: 2023-03-16
Payer: MEDICARE

## 2023-03-16 VITALS
BODY MASS INDEX: 44.41 KG/M2 | HEIGHT: 68 IN | TEMPERATURE: 97.2 F | WEIGHT: 293 LBS | OXYGEN SATURATION: 98 % | SYSTOLIC BLOOD PRESSURE: 134 MMHG | DIASTOLIC BLOOD PRESSURE: 80 MMHG | HEART RATE: 64 BPM | RESPIRATION RATE: 17 BRPM

## 2023-03-16 PROCEDURE — 99215 OFFICE O/P EST HI 40 MIN: CPT | Mod: 25

## 2023-03-16 PROCEDURE — 36415 COLL VENOUS BLD VENIPUNCTURE: CPT

## 2023-03-16 NOTE — HISTORY OF PRESENT ILLNESS
[FreeTextEntry1] : The patient is here for a follow up visit to review their medications and chronic medical conditions.\par dm,htn,hld [de-identified] : Considering doing abd hernia repair. \par Saw pulm- cpap in process of being adjusted. \par SAw nutritionist- started on rybelsus for weight loss, day 2. \par Otherwise feels good. Appet, sleep, bms, voiding, mood all ok. no pain.\par Reviewed all interim as well as relevant prior consultations, labs and radiological studies.\par

## 2023-03-16 NOTE — PHYSICAL EXAM
[No Acute Distress] : no acute distress [Well Nourished] : well nourished [Well-Appearing] : well-appearing [Normal Sclera/Conjunctiva] : normal sclera/conjunctiva [PERRL] : pupils equal round and reactive to light [EOMI] : extraocular movements intact [Normal Outer Ear/Nose] : the outer ears and nose were normal in appearance [Normal Oropharynx] : the oropharynx was normal [No JVD] : no jugular venous distention [No Lymphadenopathy] : no lymphadenopathy [Supple] : supple [No Respiratory Distress] : no respiratory distress  [No Accessory Muscle Use] : no accessory muscle use [Clear to Auscultation] : lungs were clear to auscultation bilaterally [Normal Rate] : normal rate  [Regular Rhythm] : with a regular rhythm [Normal S1, S2] : normal S1 and S2 [No Murmur] : no murmur heard [No Carotid Bruits] : no carotid bruits [No Abdominal Bruit] : a ~M bruit was not heard ~T in the abdomen [No Varicosities] : no varicosities [Pedal Pulses Present] : the pedal pulses are present [No Edema] : there was no peripheral edema [No Palpable Aorta] : no palpable aorta [No Extremity Clubbing/Cyanosis] : no extremity clubbing/cyanosis [Soft] : abdomen soft [Non Tender] : non-tender [Non-distended] : non-distended [No HSM] : no HSM [Normal Bowel Sounds] : normal bowel sounds [No CVA Tenderness] : no CVA  tenderness [No Spinal Tenderness] : no spinal tenderness [No Joint Swelling] : no joint swelling [Grossly Normal Strength/Tone] : grossly normal strength/tone [Coordination Grossly Intact] : coordination grossly intact [No Focal Deficits] : no focal deficits [Deep Tendon Reflexes (DTR)] : deep tendon reflexes were 2+ and symmetric [Normal Affect] : the affect was normal [Normal Insight/Judgement] : insight and judgment were intact [de-identified] : obese [de-identified] : left thyroid prominence. [de-identified] : right sided incisional hernia. large. [de-identified] : ble- edematous/indurated, hyperpigmented . [de-identified] : slow, ambulates with a cane

## 2023-03-16 NOTE — ASSESSMENT
[FreeTextEntry1] : RLE sciatica- improved, cont gabapentin\par DM2- chk fs qd, metformin, chk labs\par   optho- 2022-sept- no retinopathy\par   Feet- no sores, occasional mild neuropathy.\par HLD- chk labs, cont atorv\par HTN- stable, cont atenolol, losartan, hct\par Glaucoma- f/u with optho, cont drops\par Bilat Knee pain- f/u with ortho\par Chronic Venous Insuff- low Na diet, hct.saw vasc. no tx at this time\par Morbid obesity- discussed low calorie diet and exercise as part of a weight loss plan.\par Breast Cancer- cont with RTx and arimidex per surgeon/rad onc/ onc. \par Decr Hearing- ENT f/u\par abd hernia- saw surg, referred to weight loss program, started rybelsus. surgery planned, discussed r/b/a to doing surgery now vs not doing surgery and risk of monitoring, including incarceration. \par Anemia- t iron 65mg qd, vit c 500mg qd, referred to GI, rpt labs\par Thyroid nodule-  bx-neg for cancer\par pod eval\par Pt. was encouraged to do all relevant screening tests including but not limited to mammogram, gyn visit, colonoscopy, bone density, annual skin exam.\par Total time spent 40 min. ( 25 min was FTF and 15 min was chart review and documentation for a total of 40 min. ).\par \par \par \par \par

## 2023-03-19 LAB
ALBUMIN SERPL ELPH-MCNC: 4.3 G/DL
ALP BLD-CCNC: 123 U/L
ALT SERPL-CCNC: 10 U/L
ANION GAP SERPL CALC-SCNC: 15 MMOL/L
AST SERPL-CCNC: 15 U/L
BASOPHILS # BLD AUTO: 0.02 K/UL
BASOPHILS NFR BLD AUTO: 0.3 %
BILIRUB SERPL-MCNC: 0.4 MG/DL
BUN SERPL-MCNC: 18 MG/DL
CALCIUM SERPL-MCNC: 10 MG/DL
CHLORIDE SERPL-SCNC: 97 MMOL/L
CHOLEST SERPL-MCNC: 134 MG/DL
CO2 SERPL-SCNC: 28 MMOL/L
CREAT SERPL-MCNC: 0.71 MG/DL
EGFR: 93 ML/MIN/1.73M2
EOSINOPHIL # BLD AUTO: 0.08 K/UL
EOSINOPHIL NFR BLD AUTO: 1.3 %
ESTIMATED AVERAGE GLUCOSE: 160 MG/DL
GLUCOSE SERPL-MCNC: 116 MG/DL
HBA1C MFR BLD HPLC: 7.2 %
HCT VFR BLD CALC: 36 %
HDLC SERPL-MCNC: 40 MG/DL
HGB BLD-MCNC: 11.3 G/DL
IMM GRANULOCYTES NFR BLD AUTO: 0.2 %
LDLC SERPL CALC-MCNC: 76 MG/DL
LYMPHOCYTES # BLD AUTO: 2.02 K/UL
LYMPHOCYTES NFR BLD AUTO: 32.2 %
MAN DIFF?: NORMAL
MCHC RBC-ENTMCNC: 26 PG
MCHC RBC-ENTMCNC: 31.4 GM/DL
MCV RBC AUTO: 82.9 FL
MONOCYTES # BLD AUTO: 0.37 K/UL
MONOCYTES NFR BLD AUTO: 5.9 %
NEUTROPHILS # BLD AUTO: 3.77 K/UL
NEUTROPHILS NFR BLD AUTO: 60.1 %
NONHDLC SERPL-MCNC: 94 MG/DL
PLATELET # BLD AUTO: 203 K/UL
POTASSIUM SERPL-SCNC: 3.7 MMOL/L
PROT SERPL-MCNC: 6.3 G/DL
RBC # BLD: 4.34 M/UL
RBC # FLD: 17.5 %
SODIUM SERPL-SCNC: 139 MMOL/L
TRIGL SERPL-MCNC: 89 MG/DL
WBC # FLD AUTO: 6.27 K/UL

## 2023-03-20 ENCOUNTER — APPOINTMENT (OUTPATIENT)
Dept: HEMATOLOGY ONCOLOGY | Facility: CLINIC | Age: 68
End: 2023-03-20
Payer: MEDICARE

## 2023-03-20 VITALS
OXYGEN SATURATION: 98 % | TEMPERATURE: 97.2 F | BODY MASS INDEX: 44.4 KG/M2 | RESPIRATION RATE: 17 BRPM | DIASTOLIC BLOOD PRESSURE: 78 MMHG | SYSTOLIC BLOOD PRESSURE: 135 MMHG | HEART RATE: 89 BPM | WEIGHT: 292 LBS

## 2023-03-20 PROCEDURE — 99214 OFFICE O/P EST MOD 30 MIN: CPT

## 2023-03-20 NOTE — CONSULT LETTER
[Dear  ___] : Dear  [unfilled], [Consult Letter:] : I had the pleasure of evaluating your patient, [unfilled]. [Please see my note below.] : Please see my note below. [Consult Closing:] : Thank you very much for allowing me to participate in the care of this patient.  If you have any questions, please do not hesitate to contact me. [Sincerely,] : Sincerely, [FreeTextEntry2] : Dr. Russell Gold [FreeTextEntry3] : Alexa Brooks MD\par Division of Medical Oncology and Hematology\par [facility]\par [employer] [facility]\par [place], [place]

## 2023-03-20 NOTE — PHYSICAL EXAM
[Ambulatory and capable of all self care but unable to carry out any work activities] : Status 2- Ambulatory and capable of all self care but unable to carry out any work activities. Up and about more than 50% of waking hours [Obese] : obese [Normal] : affect appropriate [de-identified] : healing right breast

## 2023-03-20 NOTE — ASSESSMENT
[FreeTextEntry1] : In summary, Ms. JOVITA WALLACE is a 67 year old postmenopausal female with stage IA (T1b, N0, Mx) ER positive, CA positive, HER-2/rodrigo negative invasive ductal carcinoma of the right breast. She is status post lumpectomy on 2/23/2022 and is scheduled to meet with Dr Arellano for radiation Oncology consultation. ODX 4. RT completed 5/2022. Anastrozole started 6/2022. \par \par 1. Breast ca- Ms. JOVITA WALLACE  is tolerating AI well, good compliance. She has mild side effects from the treatment. Continue treatment x 5-10 yrs. Annual breast imaging reviewed\par 2.  Concern for worsening bone density and fractures due to anastrozole. Rec to  continue calcium and vit D. DEXA 1-2 yrs. \par 3.  High cholesterol/CAD risk factors: Concern for worsening cholesterol/CAD risk factors due to anastrozole. Pt is on Lipitor. Lipid profile annually. Life style modifications d/w her.\par \par 	\par RTC 6 m\par \par

## 2023-03-20 NOTE — HISTORY OF PRESENT ILLNESS
[T: ___] : T[unfilled] [N: ___] : N[unfilled] [M: ___] : M[unfilled] [de-identified] : Ms. Debra Alva is a 67 year old female here for an evaluation of breast cancer. Her oncologic history is as follows:\par \par She underwent routine breast imaging on 9/4/2021( BIRADS 0) which showed a 9 mm spiculated mass in the upper central right breast corresponding with the sonographic finding of an irregular hypoechoic at the 12:00, 3 cm FN right breast for which additional targeted imaging of right breast and axilla is rec. She underwent additional US imaging of the right breast on 11/12/2021 (BIRADS 5) which showed irregular hypoechoic mass measuring 5 mm in the right breast 12:00 for which US guided core biopsy is recommended \par \par  She underwent  2 site right breast core biopsy on 11/23/2021 the right breast showed 12 O'clock 3cmfn; breast parenchyma with multinucleated giant cells, foreign body material and scar formation; right breast, 11 O'clock, 13cmfn showed invasive moderately differentiated ductal carcinoma, Tiffanie score 6 /9, measuring 0.6 cm, ER+ 80%,  PgR + 95%, HER-2:Negative, Ductal carcinoma in situ, solid pattern with intermediate grade nuclear atypia, No Lymphovascular permeation seen\par The results at the first site, right breast 12:00 axis, 3 cm from the nipple are BENIGN AND DISCORDANT.\par The results at the second site, right breast 11:00 axis, 13 cm from the nipple, demonstrating sonographic mammographic concordance, are CONCORDANT.\par \par She underwent  Right breast, upper outer quadrant, lumpectomy/ sentinel lymph node biopsy on 2/3/2022 which revealed invasive moderately differentiated ductal carcinoma,NST,  [place] grad 2,  measuring  0.8 cm, Margin were negative.  Lymphovascular invasion was not seen.No DCIS was noted in this specimen but  solid, intermediate grade DCIS noted inprior core biopsy. SIX sentinel lymph node were removed and 0/6 were negative for metastasis.\par \par  She also underwent right breast subareolar mass, excisional biopsy which revealed Breast with focal dense fibrosis, fat necrosis and hemosiderin deposition consistent with organizing biopsy site scar. Adjacent areas consisted of foreign body giant cell reaction and chronic inflammation.\par \par ODX 4\par  \par She uses a cane and walker at baseline at home. Here in wheel chair. She has weak knees 2/2 arthritis \par \par 12/2019 : DEXA normal [de-identified] : In summary, Ms. JOVITA WALLACE is a 67 year old postmenopausal female with stage IA (T1b, N0, Mx) ER positive, DC positive, HER-2/rodrigo negative invasive ductal carcinoma of the right breast. She is status post lumpectomy on 2/23/2022. ODX 4. RT completed 5/2022. Anastrozole started 6/2022. \par \par Takes Anastrozole daily, good compliance. She denies aches/pain, hot flashes, vag dryness, excessive fatigue, GI s/e, hair loss. She is active, no change in energy, wt or appetite. \par mammogram - 9/2021, 1/2023 birads 2\par She uses a cane and walker at baseline at home. Here in wheel chair. She has weak knees 2/2 arthritis . She is starting PT for knees. SHe uses stat bike. \par 12/2019, 3/2022 : DEXA normal\par pt works from home

## 2023-04-16 ENCOUNTER — RX RENEWAL (OUTPATIENT)
Age: 68
End: 2023-04-16

## 2023-05-09 ENCOUNTER — APPOINTMENT (OUTPATIENT)
Age: 68
End: 2023-05-09

## 2023-05-09 ENCOUNTER — APPOINTMENT (OUTPATIENT)
Dept: BARIATRICS/WEIGHT MGMT | Facility: CLINIC | Age: 68
End: 2023-05-09
Payer: MEDICARE

## 2023-05-09 VITALS
BODY MASS INDEX: 43.19 KG/M2 | WEIGHT: 285 LBS | RESPIRATION RATE: 17 BRPM | DIASTOLIC BLOOD PRESSURE: 74 MMHG | SYSTOLIC BLOOD PRESSURE: 124 MMHG | OXYGEN SATURATION: 97 % | HEART RATE: 83 BPM | HEIGHT: 68 IN | TEMPERATURE: 97.6 F

## 2023-05-09 DIAGNOSIS — R60.0 LOCALIZED EDEMA: ICD-10-CM

## 2023-05-09 PROCEDURE — XXXXX: CPT | Mod: 1L

## 2023-05-09 PROCEDURE — G0447 BEHAVIOR COUNSEL OBESITY 15M: CPT | Mod: 59

## 2023-05-09 PROCEDURE — 99213 OFFICE O/P EST LOW 20 MIN: CPT

## 2023-05-09 NOTE — HISTORY OF PRESENT ILLNESS
[FreeTextEntry1] : 68 year old female with T2DM, HTN, HLD, Asthma, Thyroid nodules and Breast cancer presents for follow up of weight gain. She was referred for medical weight management by Dr. Galvin prior to repair of her large ventral hernia. She feels that she began struggling with her weight after coming to [place] about 30 years ago from [place]. She feels that her weight has made it difficult for her to ambulate. She is now using a wheelchair when outside the house and a walker inside. This has made it difficult for her to perform ADLs and has subsequently impacted her mental health. She does try to cook food but is limited as she feels tired. She reports she feels she has become "lazy". She tends to make foods that are simple to make like sandwich's. \par She has MARTHA and has a CPAP but does not use it every night. \par She has T2DM and takes Metformin 500 mg 1 tab BID. A1C is 6.4. \par She has b/l thyroid nodules and recently had an FNA of larger 4.4 cm nodule which was benign. Second nodule was not biopsied.\par \par Since her last visit, she has been taking Rybelsus 3 mg daily. Dr. Vásquez increased her dose to 7 mg daily but she was not able to get it due to cost. She has been taking 3mg sample daily. Feels well and denies any GI side effects. Has noted that she is now only having a BM every other day instead of daily. She sent the application for the patient assistance program but has not heard back. She has tried to incorporate some of the changes that were recommended by the RD. She started PT and has been going to aqua therapy weekly. \par Recent labs were reviewed and discussed.

## 2023-05-09 NOTE — PHYSICAL EXAM
[Obese, well nourished, in no acute distress] : obese, well nourished, in no acute distress [Normal] : grossly intact [de-identified] : thyroid mildly enlarged

## 2023-05-09 NOTE — ASSESSMENT
[FreeTextEntry1] : Patient with multiple obesity related comorbidities. We discussed the importance of weight loss in the management of her comorbidities. We discussed the risks of continued excess weight on long term health. She met with the RD today to discuss dietary changes. We discussed ways she can incorporate exercise into her daily routine given her limitations such as upper body exercise and chair exercise. Continue PT and aqua therapy.  At least 15 minutes was spent during the visit on obesity counseling. \par \par Agree with increase of Rybelsus to 7 mg given A1C is elevated. However, she will remain on 3 mg daily due to cost. Will f/u with [employer] Patient Assistance Program. ANother sample given to patient. Will let me know about coverage.\par \par All of her questions were answered. Emotional support provided.\par \par f/u in 10-12 weeks

## 2023-05-24 ENCOUNTER — RX RENEWAL (OUTPATIENT)
Age: 68
End: 2023-05-24

## 2023-05-25 ENCOUNTER — APPOINTMENT (OUTPATIENT)
Dept: OTOLARYNGOLOGY | Facility: CLINIC | Age: 68
End: 2023-05-25
Payer: MEDICARE

## 2023-05-25 VITALS
HEIGHT: 68 IN | WEIGHT: 285 LBS | BODY MASS INDEX: 43.19 KG/M2 | DIASTOLIC BLOOD PRESSURE: 74 MMHG | OXYGEN SATURATION: 99 % | HEART RATE: 82 BPM | SYSTOLIC BLOOD PRESSURE: 132 MMHG

## 2023-05-25 DIAGNOSIS — J34.89 OTHER SPECIFIED DISORDERS OF NOSE AND NASAL SINUSES: ICD-10-CM

## 2023-05-25 DIAGNOSIS — J31.0 CHRONIC RHINITIS: ICD-10-CM

## 2023-05-25 PROCEDURE — 99214 OFFICE O/P EST MOD 30 MIN: CPT | Mod: 25

## 2023-05-25 PROCEDURE — 31231 NASAL ENDOSCOPY DX: CPT

## 2023-05-25 RX ORDER — IPRATROPIUM BROMIDE 42 UG/1
0.06 SPRAY NASAL
Qty: 1 | Refills: 3 | Status: ACTIVE | COMMUNITY
Start: 2022-11-09 | End: 1900-01-01

## 2023-05-25 RX ORDER — FLUTICASONE PROPIONATE 50 UG/1
50 SPRAY, METERED NASAL
Qty: 1 | Refills: 3 | Status: ACTIVE | COMMUNITY
Start: 2022-11-09 | End: 1900-01-01

## 2023-05-25 RX ORDER — LATANOPROST/PF 0.005 %
0.01 DROPS OPHTHALMIC (EYE)
Refills: 0 | Status: ACTIVE | COMMUNITY
Start: 2018-02-19

## 2023-05-25 RX ORDER — TERCONAZOLE 8 MG/G
0.8 CREAM VAGINAL
Qty: 20 | Refills: 0 | Status: ACTIVE | COMMUNITY
Start: 2018-04-06

## 2023-05-25 RX ORDER — BLOOD SUGAR DIAGNOSTIC
STRIP MISCELLANEOUS
Qty: 100 | Refills: 0 | Status: ACTIVE | COMMUNITY
Start: 2018-01-14

## 2023-05-25 NOTE — HISTORY OF PRESENT ILLNESS
[de-identified] : 68 year old female presents for follow up for chronic rhinitis\par LCV 01/12/23 renewed Flonase and Ipratropium (using medication daily)\par Holding off on Cpap at this time- states PND has been less severe but occasionally get it at night pending a new sleep study 05/26/23\par Reports bilateral otalgia and clogged ears since 02/2023- denies the use of ear drops\par Sense of smell and taste is poor\par Patient denies otorrhea, ear infections, hearing loss, tinnitus, epistaxis, dizziness, vertigo, headaches, facial pain and pressure and nasal congestion \par \par \par PMH: Breast Cancer, HTN, HLD, MARTHA, Glaucoma, DM, Asthma \par PSH: Cataract surgery, hysterectomy, R lumpectomy & lymph node, hernia repair

## 2023-05-31 ENCOUNTER — RX RENEWAL (OUTPATIENT)
Age: 68
End: 2023-05-31

## 2023-06-16 ENCOUNTER — APPOINTMENT (OUTPATIENT)
Dept: INTERNAL MEDICINE | Facility: CLINIC | Age: 68
End: 2023-06-16
Payer: MEDICARE

## 2023-06-16 ENCOUNTER — RESULT REVIEW (OUTPATIENT)
Age: 68
End: 2023-06-16

## 2023-06-16 VITALS
RESPIRATION RATE: 18 BRPM | SYSTOLIC BLOOD PRESSURE: 126 MMHG | HEART RATE: 72 BPM | OXYGEN SATURATION: 97 % | BODY MASS INDEX: 42.13 KG/M2 | DIASTOLIC BLOOD PRESSURE: 82 MMHG | WEIGHT: 278 LBS | TEMPERATURE: 98.4 F | HEIGHT: 68 IN

## 2023-06-16 DIAGNOSIS — R27.0 ATAXIA, UNSPECIFIED: ICD-10-CM

## 2023-06-16 PROCEDURE — 99215 OFFICE O/P EST HI 40 MIN: CPT

## 2023-06-16 RX ORDER — ORAL SEMAGLUTIDE 3 MG/1
3 TABLET ORAL
Qty: 30 | Refills: 0 | Status: DISCONTINUED | COMMUNITY
Start: 2023-05-24 | End: 2023-06-16

## 2023-06-16 NOTE — PHYSICAL EXAM
[No Acute Distress] : no acute distress [Well Nourished] : well nourished [Well-Appearing] : well-appearing [Normal Sclera/Conjunctiva] : normal sclera/conjunctiva [PERRL] : pupils equal round and reactive to light [EOMI] : extraocular movements intact [Normal Outer Ear/Nose] : the outer ears and nose were normal in appearance [Normal Oropharynx] : the oropharynx was normal [No JVD] : no jugular venous distention [No Lymphadenopathy] : no lymphadenopathy [Supple] : supple [No Respiratory Distress] : no respiratory distress  [No Accessory Muscle Use] : no accessory muscle use [Clear to Auscultation] : lungs were clear to auscultation bilaterally [Normal Rate] : normal rate  [Regular Rhythm] : with a regular rhythm [Normal S1, S2] : normal S1 and S2 [No Murmur] : no murmur heard [No Carotid Bruits] : no carotid bruits [No Abdominal Bruit] : a ~M bruit was not heard ~T in the abdomen [No Varicosities] : no varicosities [Pedal Pulses Present] : the pedal pulses are present [No Edema] : there was no peripheral edema [No Palpable Aorta] : no palpable aorta [No Extremity Clubbing/Cyanosis] : no extremity clubbing/cyanosis [Soft] : abdomen soft [Non Tender] : non-tender [Non-distended] : non-distended [No HSM] : no HSM [Normal Bowel Sounds] : normal bowel sounds [No CVA Tenderness] : no CVA  tenderness [No Spinal Tenderness] : no spinal tenderness [No Joint Swelling] : no joint swelling [Grossly Normal Strength/Tone] : grossly normal strength/tone [Coordination Grossly Intact] : coordination grossly intact [Deep Tendon Reflexes (DTR)] : deep tendon reflexes were 2+ and symmetric [Normal Affect] : the affect was normal [Normal Insight/Judgement] : insight and judgment were intact [de-identified] : obese [de-identified] : left thyroid prominence. [de-identified] : right sided incisional hernia. large. [de-identified] : ble-/indurated, hyperpigmented . [de-identified] : slow, ambulates with a cane

## 2023-06-16 NOTE — HISTORY OF PRESENT ILLNESS
[FreeTextEntry1] : The patient is here for a follow up visit to review their medications and chronic medical conditions.\par dm, ataxia [de-identified] : has signif difficulty ambulating- using a cane and walking very slowly with assist of 1. \par has seen neuro. on gabapentin. \par a1c is controlled. 70-7.2\par MRI l-s spine shows mod/severe spinal and foraminal stenosis on l1-2\par Otherwise feels good. Appet, sleep, bms, voiding, mood all ok.\par no b/b incont. \par

## 2023-06-16 NOTE — ASSESSMENT
[FreeTextEntry1] : Ataxia- ble radic sympt/ weakness in legs- refer to pain management and spinal surg for eval, cont with ptx, chk xrays of hips and knees to r/o confounding factors for ataxia. cont with gabapentin. avoid falls./ fall hazards. \par DM2- chk fs qd, metformin, chk labs\par   optho- 2022-sept- no retinopathy\par   Feet- no sores, occasional mild neuropathy.\par HLD- chk labs, cont atorv\par HTN- stable, cont atenolol, losartan, hct\par Glaucoma- f/u with optho, cont drops\par Bilat Knee pain- f/u with ortho\par Chronic Venous Insuff- low Na diet, hct.saw vasc. no tx at this time\par Morbid obesity- discussed low calorie diet and exercise as part of a weight loss plan.\par Breast Cancer- cont with RTx and arimidex per surgeon/rad onc/ onc. \par Decr Hearing- ENT f/u\par abd hernia- saw surg, referred to weight loss program, started rybelsus. surgery planned, discussed r/b/a to doing surgery now vs not doing surgery and risk of monitoring, including incarceration. \par Anemia- t iron 65mg qd, vit c 500mg qd, referred to GI, rpt labs\par Thyroid nodule-  bx-neg for cancer\par pod eval\par Pt. was encouraged to do all relevant screening tests including but not limited to mammogram, gyn visit, colonoscopy, bone density, annual skin exam.\par Total time spent 40 min. ( 25 min was FTF and 15 min was chart review and documentation for a total of 40 min. ).\par \par \par \par \par

## 2023-06-21 LAB
ALBUMIN SERPL ELPH-MCNC: 4.1 G/DL
ALP BLD-CCNC: 129 U/L
ALT SERPL-CCNC: 11 U/L
ANION GAP SERPL CALC-SCNC: 13 MMOL/L
AST SERPL-CCNC: 13 U/L
BILIRUB SERPL-MCNC: 0.7 MG/DL
BUN SERPL-MCNC: 13 MG/DL
CALCIUM SERPL-MCNC: 10.3 MG/DL
CHLORIDE SERPL-SCNC: 102 MMOL/L
CHOLEST SERPL-MCNC: 111 MG/DL
CO2 SERPL-SCNC: 30 MMOL/L
CREAT SERPL-MCNC: 0.57 MG/DL
EGFR: 99 ML/MIN/1.73M2
ESTIMATED AVERAGE GLUCOSE: 157 MG/DL
GLUCOSE SERPL-MCNC: 104 MG/DL
HBA1C MFR BLD HPLC: 7.1 %
HDLC SERPL-MCNC: 34 MG/DL
LDLC SERPL CALC-MCNC: 59 MG/DL
NONHDLC SERPL-MCNC: 77 MG/DL
POTASSIUM SERPL-SCNC: 4 MMOL/L
PROT SERPL-MCNC: 6.5 G/DL
SODIUM SERPL-SCNC: 145 MMOL/L
TRIGL SERPL-MCNC: 90 MG/DL

## 2023-06-22 ENCOUNTER — NON-APPOINTMENT (OUTPATIENT)
Age: 68
End: 2023-06-22

## 2023-07-01 ENCOUNTER — OUTPATIENT (OUTPATIENT)
Dept: OUTPATIENT SERVICES | Facility: HOSPITAL | Age: 68
LOS: 1 days | End: 2023-07-01
Payer: MEDICARE

## 2023-07-01 ENCOUNTER — APPOINTMENT (OUTPATIENT)
Dept: RADIOLOGY | Facility: IMAGING CENTER | Age: 68
End: 2023-07-01
Payer: MEDICARE

## 2023-07-01 DIAGNOSIS — Z90.711 ACQUIRED ABSENCE OF UTERUS WITH REMAINING CERVICAL STUMP: Chronic | ICD-10-CM

## 2023-07-01 DIAGNOSIS — R27.0 ATAXIA, UNSPECIFIED: ICD-10-CM

## 2023-07-01 DIAGNOSIS — Z98.890 OTHER SPECIFIED POSTPROCEDURAL STATES: Chronic | ICD-10-CM

## 2023-07-01 DIAGNOSIS — M25.561 PAIN IN RIGHT KNEE: ICD-10-CM

## 2023-07-01 DIAGNOSIS — K46.9 UNSPECIFIED ABDOMINAL HERNIA WITHOUT OBSTRUCTION OR GANGRENE: Chronic | ICD-10-CM

## 2023-07-01 PROCEDURE — 73522 X-RAY EXAM HIPS BI 3-4 VIEWS: CPT | Mod: 26

## 2023-07-01 PROCEDURE — 73502 X-RAY EXAM HIP UNI 2-3 VIEWS: CPT

## 2023-07-01 PROCEDURE — 73562 X-RAY EXAM OF KNEE 3: CPT | Mod: 26,50

## 2023-07-01 PROCEDURE — 73562 X-RAY EXAM OF KNEE 3: CPT

## 2023-07-07 ENCOUNTER — NON-APPOINTMENT (OUTPATIENT)
Age: 68
End: 2023-07-07

## 2023-07-15 ENCOUNTER — APPOINTMENT (OUTPATIENT)
Dept: ORTHOPEDIC SURGERY | Facility: CLINIC | Age: 68
End: 2023-07-15

## 2023-07-21 ENCOUNTER — APPOINTMENT (OUTPATIENT)
Dept: BARIATRICS/WEIGHT MGMT | Facility: CLINIC | Age: 68
End: 2023-07-21
Payer: MEDICARE

## 2023-07-21 ENCOUNTER — APPOINTMENT (OUTPATIENT)
Age: 68
End: 2023-07-21

## 2023-07-21 VITALS
HEART RATE: 80 BPM | HEIGHT: 68 IN | WEIGHT: 281 LBS | SYSTOLIC BLOOD PRESSURE: 116 MMHG | BODY MASS INDEX: 42.59 KG/M2 | OXYGEN SATURATION: 97 % | TEMPERATURE: 98 F | DIASTOLIC BLOOD PRESSURE: 71 MMHG

## 2023-07-21 PROCEDURE — XXXXX: CPT | Mod: 1L

## 2023-07-21 PROCEDURE — G0447 BEHAVIOR COUNSEL OBESITY 15M: CPT | Mod: 59

## 2023-07-21 PROCEDURE — 99214 OFFICE O/P EST MOD 30 MIN: CPT

## 2023-07-21 RX ORDER — ORAL SEMAGLUTIDE 7 MG/1
7 TABLET ORAL
Qty: 30 | Refills: 2 | Status: ACTIVE | COMMUNITY
Start: 2023-02-01 | End: 1900-01-01

## 2023-07-21 NOTE — ASSESSMENT
[FreeTextEntry1] : Patient with multiple obesity related comorbidities.Continue to work on making lifestyle modifications. She met with the RD today to discuss dietary changes. We discussed ways she can incorporate exercise into her daily routine given her limitations such as upper body exercise and chair exercise. Continue PT and aqua therapy.  At least 15 minutes was spent during the visit on obesity counseling. \par \par Recommended to increase the dose of Rybelsus to 7 mg daily to help with DM and weight loss. Can use OTC colace to help with BM. If not improvement or has worsening will call the office. \par  Will f/u with [employer] Patient Assistance Program. \par \par All of her questions were answered. Emotional support provided.\par \par f/u in 10-12 weeks

## 2023-07-21 NOTE — PHYSICAL EXAM
[Obese, well nourished, in no acute distress] : obese, well nourished, in no acute distress [Normal] : affect appropriate [de-identified] : thyroid mildly enlarged

## 2023-07-21 NOTE — HISTORY OF PRESENT ILLNESS
[FreeTextEntry1] : 68 year old female with T2DM, HTN, HLD, Asthma, Thyroid nodules and Breast cancer presents for follow up of weight gain. She was referred for medical weight management by Dr. Galvin prior to repair of her large ventral hernia. She feels that she began struggling with her weight after coming to [place] about 30 years ago from [place]. She feels that her weight has made it difficult for her to ambulate. She is now using a wheelchair when outside the house and a walker inside. This has made it difficult for her to perform ADLs and has subsequently impacted her mental health. She does try to cook food but is limited as she feels tired. She reports she feels she has become "lazy". She tends to make foods that are simple to make like sandwich's. \par She has MARTHA and has a CPAP but does not use it every night. \par She has T2DM and takes Metformin 500 mg 1 tab BID. A1C is 6.4. \par She has b/l thyroid nodules and recently had an FNA of larger 4.4 cm nodule which was benign. Second nodule was not biopsied.\par \par 7/21/29\par Since her last visit, she has been taking Rybelsus 3 mg daily. She did not increase her dose as she was trying to use up the samples she had because the medication is expensive. Feels well and denies any GI side effects. Has BM every 2 days now. She sent the application for the patient assistance program but has not heard back. She has tried to incorporate some of the changes that were recommended by the RD. She started PT and has been going to aqua therapy weekly. \par Recent labs were reviewed and discussed.

## 2023-07-24 ENCOUNTER — APPOINTMENT (OUTPATIENT)
Dept: PULMONOLOGY | Facility: CLINIC | Age: 68
End: 2023-07-24
Payer: MEDICARE

## 2023-07-24 VITALS — SYSTOLIC BLOOD PRESSURE: 121 MMHG | DIASTOLIC BLOOD PRESSURE: 74 MMHG | HEART RATE: 67 BPM | OXYGEN SATURATION: 96 %

## 2023-07-24 DIAGNOSIS — J45.909 UNSPECIFIED ASTHMA, UNCOMPLICATED: ICD-10-CM

## 2023-07-24 PROCEDURE — 94060 EVALUATION OF WHEEZING: CPT

## 2023-07-24 PROCEDURE — 99213 OFFICE O/P EST LOW 20 MIN: CPT | Mod: 25

## 2023-07-24 RX ORDER — FLUTICASONE PROPIONATE 110 UG/1
110 AEROSOL, METERED RESPIRATORY (INHALATION)
Qty: 1 | Refills: 5 | Status: ACTIVE | COMMUNITY
Start: 2022-08-26 | End: 1900-01-01

## 2023-07-24 NOTE — ADDENDUM
[FreeTextEntry1] : I, Feliciano Ayalalorrie, acted solely as a scribe for Dr. Angel Luis Light M.D. on this date 07/24/2023. \par \par All medical record entries made by the Scribe were at my, Dr. Angel Luis Light M.D., direction and personally dictated by me on 07/24/2023. I have reviewed the chart and agree that the record accurately reflects my personal performance of the history, physical exam, assessment and plan. I have also personally directed, reviewed, and agreed with the chart.

## 2023-07-24 NOTE — PROCEDURE
[FreeTextEntry1] : Operatory titration study demonstrates effective treatment of previously diagnosed sleep apnea with bilevel PAP\par \par Spirometry indicates interval improvement compared to prior.

## 2023-07-24 NOTE — HISTORY OF PRESENT ILLNESS
[FreeTextEntry1] : JOVITA WALLACE is a 68 year old female, with history of asthma, obstructive sleep apnea, who presents to the office for follow up evaluation. Patient reports that she is feeling well overall with no respiratory complaints. She states that she continues to take her maintenance asthma medications.\par \par She underwent laboratory polysomnography having failed empiric CPAP after home sleep study testing

## 2023-07-24 NOTE — ASSESSMENT
[FreeTextEntry1] : Ms. JOVITA WALLACE is an 68 year old female with obstructive sleep apnea. Lab titration sleep study results recommended that patient be started on an auto bilevel PAP machine with a full face mask.  This was ordered\par \par Follow-up for compliance telehealth visit 1 week after receiving device\par \par Improved lung function indicated in spirometry today.  Continue inhaler

## 2023-08-01 ENCOUNTER — APPOINTMENT (OUTPATIENT)
Dept: ORTHOPEDIC SURGERY | Facility: CLINIC | Age: 68
End: 2023-08-01
Payer: MEDICARE

## 2023-08-01 DIAGNOSIS — M17.5 OTHER UNILATERAL SECONDARY OSTEOARTHRITIS OF KNEE: ICD-10-CM

## 2023-08-01 PROCEDURE — 99213 OFFICE O/P EST LOW 20 MIN: CPT

## 2023-08-02 ENCOUNTER — APPOINTMENT (OUTPATIENT)
Dept: PAIN MANAGEMENT | Facility: CLINIC | Age: 68
End: 2023-08-02

## 2023-08-07 VITALS — WEIGHT: 281 LBS | HEIGHT: 68 IN | BODY MASS INDEX: 42.59 KG/M2

## 2023-08-08 PROBLEM — M17.5 OTHER SECONDARY OSTEOARTHRITIS OF LEFT KNEE: Status: ACTIVE | Noted: 2023-08-08

## 2023-08-08 PROBLEM — M17.5 OTHER SECONDARY OSTEOARTHRITIS OF RIGHT KNEE: Status: ACTIVE | Noted: 2023-08-08

## 2023-08-08 NOTE — HISTORY OF PRESENT ILLNESS
[Lower back] : lower back [10] : 10 [Dull/Aching] : dull/aching [Radiating] : radiating [Constant] : constant [Sleep] : sleep [Nothing helps with pain getting better] : Nothing helps with pain getting better [de-identified] : 08/07/2023: This is a 68 year F with c/o of b/l knee pain. Was referred by PCP for chronic b/l knee pain that started years ago. Attends PT and swimming with good transient relief; constant knee pain; hx of R meniscus repair; reports cracking sensation in L knee. Pain meds help with pain, Pain occassionally radiates from lower back into RLE to buttock down to anterior thigh.  [] : no [FreeTextEntry5] : 69 yo F presenting with low back pain that started years ago. Complaint of b/l knee pain. Referred by PCP. Attends PT; swimming. Had meniscus repair ine past- RT knee. Cracking of left knee.  [FreeTextEntry7] : b/l knees

## 2023-08-08 NOTE — ASSESSMENT
[FreeTextEntry1] : went over Debra's knee XRs which show advanced bilateral tricompartmental knee OA; there is complete loss of cartilage bearing surface and secondary signs as well (subluxation and deformity);  she needs to be seen by a knee specialist that can manage this for her;  if her overall health can / is optimized then a totalk knee replacement would be uindertaken given the magnitude of the arthritis;  at the very least the majority of her pat the knees is from the knees themselves;  we could work up her back further but should expect to find stenosois and that may or may not be responsible for her knee pain ,  but the knee OA definitely is;  so I proposed knee specialist eval and perhaps knee injections and assess further;  if the knee injections are of no value then either the knee OA is so advanced and only a total knee will help, or the lumbar spine is the primary contributor, in which case an MRI would be done and then she would need to be agreeable to undergo epidural injections in the wake of the MRI ;  if the lumbar MRI showed stenosis and it was suspected to be a contributor we could theoretically do PT , BUT with her knees so bad I doubt PT would be effective enough, and we'd have to resort to epidurals --and given the diabetes we should not do the ESIs without factoring this in;  the knee injections could temporarily elevate her sugar too,  but we knee from her symptoms and imaging that the knee conditions justifies injections;  I discussed this at length with Debra and the gentleman that accompanied her; not certain, however, that my position was understood fully

## 2023-08-08 NOTE — IMAGING
[de-identified] : LSPINE Inspection: no defects, deformity Palpation: Midline tenderness in paraspinal musculature ROM: diminished all planes Motor: no focal deficit  Strength: 5/5 bilateral hip flexors, knee extensors, ankle dorsiflexors, EHL, ankle plantarflexors Sensation I LT  - SLR B/L  Toe and heal walking, not evaluated.  Gait mildly antalgic, mobilizing on a wheelchair.

## 2023-08-28 ENCOUNTER — RX RENEWAL (OUTPATIENT)
Age: 68
End: 2023-08-28

## 2023-08-30 ENCOUNTER — APPOINTMENT (OUTPATIENT)
Dept: VASCULAR SURGERY | Facility: CLINIC | Age: 68
End: 2023-08-30

## 2023-09-01 ENCOUNTER — APPOINTMENT (OUTPATIENT)
Dept: PULMONOLOGY | Facility: CLINIC | Age: 68
End: 2023-09-01
Payer: MEDICARE

## 2023-09-01 PROCEDURE — 99212 OFFICE O/P EST SF 10 MIN: CPT | Mod: 95

## 2023-09-01 PROCEDURE — 99202 OFFICE O/P NEW SF 15 MIN: CPT | Mod: 95

## 2023-09-01 NOTE — REASON FOR VISIT
[Home] : at home, [unfilled] , at the time of the visit. [Medical Office: ([facility])___] : at the medical office located in  [Patient] : the patient

## 2023-09-01 NOTE — HISTORY OF PRESENT ILLNESS
[TextBox_4] : Telehealth follow-up for sleep apnea.  Received her PAP machine about a week ago it is a ResMed V auto bilevel  Current setting minimum EPAP 8 max IPAP 15 pressure support 4.  Tolerating well and feels it is helping her greatly however she is only using it for about 3 hours per night I explained to her the importance of achieving compliance and that the target compliance is 4 hours per night 70% of the time as a minimum.  She will make greater efforts to be compliant and will follow-up for an in office appointment in 1 month.

## 2023-09-12 ENCOUNTER — APPOINTMENT (OUTPATIENT)
Dept: INTERNAL MEDICINE | Facility: CLINIC | Age: 68
End: 2023-09-12
Payer: MEDICARE

## 2023-09-12 VITALS
WEIGHT: 269 LBS | TEMPERATURE: 97.8 F | SYSTOLIC BLOOD PRESSURE: 122 MMHG | RESPIRATION RATE: 18 BRPM | HEART RATE: 78 BPM | OXYGEN SATURATION: 97 % | HEIGHT: 68 IN | DIASTOLIC BLOOD PRESSURE: 74 MMHG | BODY MASS INDEX: 40.77 KG/M2

## 2023-09-12 DIAGNOSIS — G58.8 OTHER SPECIFIED MONONEUROPATHIES: ICD-10-CM

## 2023-09-12 DIAGNOSIS — I83.10 VARICOSE VEINS OF UNSPECIFIED LOWER EXTREMITY WITH INFLAMMATION: ICD-10-CM

## 2023-09-12 DIAGNOSIS — M25.561 PAIN IN RIGHT KNEE: ICD-10-CM

## 2023-09-12 DIAGNOSIS — L30.9 DERMATITIS, UNSPECIFIED: ICD-10-CM

## 2023-09-12 DIAGNOSIS — M25.562 PAIN IN RIGHT KNEE: ICD-10-CM

## 2023-09-12 DIAGNOSIS — E04.1 NONTOXIC SINGLE THYROID NODULE: ICD-10-CM

## 2023-09-12 DIAGNOSIS — B35.3 TINEA PEDIS: ICD-10-CM

## 2023-09-12 PROCEDURE — 90662 IIV NO PRSV INCREASED AG IM: CPT

## 2023-09-12 PROCEDURE — G0008: CPT

## 2023-09-12 PROCEDURE — 99215 OFFICE O/P EST HI 40 MIN: CPT | Mod: 25

## 2023-09-12 PROCEDURE — 36415 COLL VENOUS BLD VENIPUNCTURE: CPT

## 2023-09-12 RX ORDER — KETOCONAZOLE 20 MG/G
2 CREAM TOPICAL
Qty: 85 | Refills: 5 | Status: ACTIVE | COMMUNITY
Start: 2023-09-12 | End: 1900-01-01

## 2023-09-13 LAB
ALBUMIN SERPL ELPH-MCNC: 4.2 G/DL
ALP BLD-CCNC: 130 U/L
ALT SERPL-CCNC: 11 U/L
ANION GAP SERPL CALC-SCNC: 19 MMOL/L
AST SERPL-CCNC: 15 U/L
BASOPHILS # BLD AUTO: 0.04 K/UL
BASOPHILS NFR BLD AUTO: 0.6 %
BILIRUB SERPL-MCNC: 0.4 MG/DL
BUN SERPL-MCNC: 15 MG/DL
CALCIUM SERPL-MCNC: 9.9 MG/DL
CHLORIDE SERPL-SCNC: 101 MMOL/L
CHOLEST SERPL-MCNC: 130 MG/DL
CO2 SERPL-SCNC: 26 MMOL/L
CREAT SERPL-MCNC: 0.57 MG/DL
EGFR: 99 ML/MIN/1.73M2
EOSINOPHIL # BLD AUTO: 0.11 K/UL
EOSINOPHIL NFR BLD AUTO: 1.6 %
ESTIMATED AVERAGE GLUCOSE: 151 MG/DL
GLUCOSE SERPL-MCNC: 95 MG/DL
HBA1C MFR BLD HPLC: 6.9 %
HCT VFR BLD CALC: 40.1 %
HDLC SERPL-MCNC: 35 MG/DL
HGB BLD-MCNC: 12.5 G/DL
IMM GRANULOCYTES NFR BLD AUTO: 0.3 %
LDLC SERPL CALC-MCNC: 77 MG/DL
LYMPHOCYTES # BLD AUTO: 2.5 K/UL
LYMPHOCYTES NFR BLD AUTO: 36.5 %
MAN DIFF?: NORMAL
MCHC RBC-ENTMCNC: 28.5 PG
MCHC RBC-ENTMCNC: 31.2 GM/DL
MCV RBC AUTO: 91.6 FL
MONOCYTES # BLD AUTO: 0.43 K/UL
MONOCYTES NFR BLD AUTO: 6.3 %
NEUTROPHILS # BLD AUTO: 3.74 K/UL
NEUTROPHILS NFR BLD AUTO: 54.7 %
NONHDLC SERPL-MCNC: 95 MG/DL
PLATELET # BLD AUTO: 246 K/UL
POTASSIUM SERPL-SCNC: 3.8 MMOL/L
PROT SERPL-MCNC: 6.9 G/DL
RBC # BLD: 4.38 M/UL
RBC # FLD: 14.2 %
SODIUM SERPL-SCNC: 147 MMOL/L
TRIGL SERPL-MCNC: 97 MG/DL
WBC # FLD AUTO: 6.84 K/UL

## 2023-09-21 ENCOUNTER — OUTPATIENT (OUTPATIENT)
Dept: OUTPATIENT SERVICES | Facility: HOSPITAL | Age: 68
LOS: 1 days | Discharge: HOME | End: 2023-09-21

## 2023-09-21 DIAGNOSIS — Z98.890 OTHER SPECIFIED POSTPROCEDURAL STATES: Chronic | ICD-10-CM

## 2023-09-21 DIAGNOSIS — Z87.828 PERSONAL HISTORY OF OTHER (HEALED) PHYSICAL INJURY AND TRAUMA: Chronic | ICD-10-CM

## 2023-09-21 DIAGNOSIS — Z98.891 HISTORY OF UTERINE SCAR FROM PREVIOUS SURGERY: Chronic | ICD-10-CM

## 2023-09-21 DIAGNOSIS — Z90.711 ACQUIRED ABSENCE OF UTERUS WITH REMAINING CERVICAL STUMP: Chronic | ICD-10-CM

## 2023-09-21 DIAGNOSIS — K46.9 UNSPECIFIED ABDOMINAL HERNIA WITHOUT OBSTRUCTION OR GANGRENE: Chronic | ICD-10-CM

## 2023-09-21 DIAGNOSIS — C50.919 MALIGNANT NEOPLASM OF UNSPECIFIED SITE OF UNSPECIFIED FEMALE BREAST: ICD-10-CM

## 2023-10-01 PROBLEM — Z92.3 HISTORY OF RADIATION THERAPY: Status: RESOLVED | Noted: 2022-05-23 | Resolved: 2023-10-01

## 2023-10-09 ENCOUNTER — APPOINTMENT (OUTPATIENT)
Dept: ORTHOPEDIC SURGERY | Facility: CLINIC | Age: 68
End: 2023-10-09
Payer: MEDICARE

## 2023-10-09 VITALS — HEIGHT: 68 IN | BODY MASS INDEX: 40.77 KG/M2 | WEIGHT: 269 LBS

## 2023-10-09 DIAGNOSIS — C80.1 MALIGNANT (PRIMARY) NEOPLASM, UNSPECIFIED: ICD-10-CM

## 2023-10-09 PROCEDURE — 20611 DRAIN/INJ JOINT/BURSA W/US: CPT | Mod: LT

## 2023-10-09 PROCEDURE — 99214 OFFICE O/P EST MOD 30 MIN: CPT | Mod: 25

## 2023-10-18 ENCOUNTER — APPOINTMENT (OUTPATIENT)
Dept: HEMATOLOGY ONCOLOGY | Facility: CLINIC | Age: 68
End: 2023-10-18
Payer: MEDICARE

## 2023-10-18 ENCOUNTER — NON-APPOINTMENT (OUTPATIENT)
Age: 68
End: 2023-10-18

## 2023-10-18 VITALS
RESPIRATION RATE: 18 BRPM | WEIGHT: 272.27 LBS | TEMPERATURE: 97.3 F | HEIGHT: 68 IN | HEART RATE: 77 BPM | SYSTOLIC BLOOD PRESSURE: 131 MMHG | OXYGEN SATURATION: 100 % | BODY MASS INDEX: 41.26 KG/M2 | DIASTOLIC BLOOD PRESSURE: 77 MMHG

## 2023-10-18 PROCEDURE — 99214 OFFICE O/P EST MOD 30 MIN: CPT

## 2023-10-22 ENCOUNTER — RX RENEWAL (OUTPATIENT)
Age: 68
End: 2023-10-22

## 2023-10-23 ENCOUNTER — APPOINTMENT (OUTPATIENT)
Dept: ORTHOPEDIC SURGERY | Facility: CLINIC | Age: 68
End: 2023-10-23
Payer: MEDICARE

## 2023-10-23 VITALS — HEIGHT: 68 IN | BODY MASS INDEX: 41.22 KG/M2 | WEIGHT: 272 LBS

## 2023-10-23 PROCEDURE — 20610 DRAIN/INJ JOINT/BURSA W/O US: CPT | Mod: RT

## 2023-10-23 PROCEDURE — 99214 OFFICE O/P EST MOD 30 MIN: CPT | Mod: 25

## 2023-10-31 ENCOUNTER — APPOINTMENT (OUTPATIENT)
Age: 68
End: 2023-10-31

## 2023-11-16 ENCOUNTER — APPOINTMENT (OUTPATIENT)
Dept: OTOLARYNGOLOGY | Facility: CLINIC | Age: 68
End: 2023-11-16

## 2023-11-20 ENCOUNTER — RX RENEWAL (OUTPATIENT)
Age: 68
End: 2023-11-20

## 2023-12-06 ENCOUNTER — RX RENEWAL (OUTPATIENT)
Age: 68
End: 2023-12-06

## 2023-12-06 RX ORDER — BLOOD SUGAR DIAGNOSTIC
STRIP MISCELLANEOUS
Qty: 3 | Refills: 2 | Status: ACTIVE | COMMUNITY
Start: 2022-09-12 | End: 1900-01-01

## 2023-12-07 ENCOUNTER — APPOINTMENT (OUTPATIENT)
Dept: INTERNAL MEDICINE | Facility: CLINIC | Age: 68
End: 2023-12-07
Payer: MEDICARE

## 2023-12-07 VITALS
HEIGHT: 68 IN | RESPIRATION RATE: 18 BRPM | TEMPERATURE: 97.3 F | DIASTOLIC BLOOD PRESSURE: 94 MMHG | BODY MASS INDEX: 40.01 KG/M2 | OXYGEN SATURATION: 98 % | SYSTOLIC BLOOD PRESSURE: 136 MMHG | WEIGHT: 264 LBS | HEART RATE: 68 BPM

## 2023-12-07 DIAGNOSIS — K59.09 OTHER CONSTIPATION: ICD-10-CM

## 2023-12-07 PROCEDURE — 99214 OFFICE O/P EST MOD 30 MIN: CPT | Mod: 25

## 2023-12-07 PROCEDURE — 36415 COLL VENOUS BLD VENIPUNCTURE: CPT

## 2023-12-07 RX ORDER — ANASTROZOLE TABLETS 1 MG/1
1 TABLET ORAL DAILY
Qty: 90 | Refills: 1 | Status: DISCONTINUED | COMMUNITY
Start: 2022-03-23 | End: 2023-12-07

## 2023-12-09 LAB
ALBUMIN SERPL ELPH-MCNC: 4 G/DL
ALP BLD-CCNC: 152 U/L
ALT SERPL-CCNC: 9 U/L
ANION GAP SERPL CALC-SCNC: 11 MMOL/L
AST SERPL-CCNC: 16 U/L
BASOPHILS # BLD AUTO: 0.03 K/UL
BASOPHILS NFR BLD AUTO: 0.6 %
BILIRUB SERPL-MCNC: 0.5 MG/DL
BUN SERPL-MCNC: 12 MG/DL
CALCIUM SERPL-MCNC: 9.2 MG/DL
CHLORIDE SERPL-SCNC: 101 MMOL/L
CHOLEST SERPL-MCNC: 164 MG/DL
CO2 SERPL-SCNC: 30 MMOL/L
CREAT SERPL-MCNC: 0.53 MG/DL
EGFR: 101 ML/MIN/1.73M2
EOSINOPHIL # BLD AUTO: 0.07 K/UL
EOSINOPHIL NFR BLD AUTO: 1.4 %
ESTIMATED AVERAGE GLUCOSE: 143 MG/DL
GLUCOSE SERPL-MCNC: 103 MG/DL
HBA1C MFR BLD HPLC: 6.6 %
HCT VFR BLD CALC: 39.7 %
HDLC SERPL-MCNC: 43 MG/DL
HGB BLD-MCNC: 13 G/DL
IMM GRANULOCYTES NFR BLD AUTO: 0.2 %
LDLC SERPL CALC-MCNC: 106 MG/DL
LYMPHOCYTES # BLD AUTO: 1.78 K/UL
LYMPHOCYTES NFR BLD AUTO: 34.4 %
MAN DIFF?: NORMAL
MCHC RBC-ENTMCNC: 29.5 PG
MCHC RBC-ENTMCNC: 32.7 GM/DL
MCV RBC AUTO: 90.2 FL
MONOCYTES # BLD AUTO: 0.32 K/UL
MONOCYTES NFR BLD AUTO: 6.2 %
NEUTROPHILS # BLD AUTO: 2.97 K/UL
NEUTROPHILS NFR BLD AUTO: 57.2 %
NONHDLC SERPL-MCNC: 121 MG/DL
PLATELET # BLD AUTO: 245 K/UL
POTASSIUM SERPL-SCNC: 4 MMOL/L
PROT SERPL-MCNC: 6.6 G/DL
RBC # BLD: 4.4 M/UL
RBC # FLD: 13.5 %
SODIUM SERPL-SCNC: 143 MMOL/L
TRIGL SERPL-MCNC: 76 MG/DL
WBC # FLD AUTO: 5.18 K/UL

## 2023-12-11 ENCOUNTER — NON-APPOINTMENT (OUTPATIENT)
Age: 68
End: 2023-12-11

## 2023-12-12 ENCOUNTER — APPOINTMENT (OUTPATIENT)
Dept: NUCLEAR MEDICINE | Facility: IMAGING CENTER | Age: 68
End: 2023-12-12
Payer: MEDICARE

## 2023-12-12 ENCOUNTER — RESULT REVIEW (OUTPATIENT)
Age: 68
End: 2023-12-12

## 2023-12-12 ENCOUNTER — OUTPATIENT (OUTPATIENT)
Dept: OUTPATIENT SERVICES | Facility: HOSPITAL | Age: 68
LOS: 1 days | End: 2023-12-12
Payer: MEDICARE

## 2023-12-12 DIAGNOSIS — Z87.828 PERSONAL HISTORY OF OTHER (HEALED) PHYSICAL INJURY AND TRAUMA: Chronic | ICD-10-CM

## 2023-12-12 DIAGNOSIS — Z98.890 OTHER SPECIFIED POSTPROCEDURAL STATES: Chronic | ICD-10-CM

## 2023-12-12 DIAGNOSIS — K46.9 UNSPECIFIED ABDOMINAL HERNIA WITHOUT OBSTRUCTION OR GANGRENE: Chronic | ICD-10-CM

## 2023-12-12 DIAGNOSIS — Z98.891 HISTORY OF UTERINE SCAR FROM PREVIOUS SURGERY: Chronic | ICD-10-CM

## 2023-12-12 DIAGNOSIS — C50.911 MALIGNANT NEOPLASM OF UNSPECIFIED SITE OF RIGHT FEMALE BREAST: ICD-10-CM

## 2023-12-12 DIAGNOSIS — Z90.711 ACQUIRED ABSENCE OF UTERUS WITH REMAINING CERVICAL STUMP: Chronic | ICD-10-CM

## 2023-12-12 PROCEDURE — 78830 RP LOCLZJ TUM SPECT W/CT 1: CPT | Mod: 26

## 2023-12-12 PROCEDURE — 78306 BONE IMAGING WHOLE BODY: CPT

## 2023-12-12 PROCEDURE — A9561: CPT

## 2023-12-12 PROCEDURE — 78830 RP LOCLZJ TUM SPECT W/CT 1: CPT

## 2023-12-12 PROCEDURE — 78306 BONE IMAGING WHOLE BODY: CPT | Mod: 26,MH

## 2023-12-14 RX ORDER — BLOOD-GLUCOSE METER
W/DEVICE KIT MISCELLANEOUS
Qty: 1 | Refills: 0 | Status: ACTIVE | COMMUNITY
Start: 2023-12-14 | End: 1900-01-01

## 2023-12-17 LAB
ALP BLD-CCNC: 147 IU/L
ALP BONE CFR SERPL: 27 %
ALP INTEST CFR SERPL: 0 %
ALP LIVER CFR SERPL: 73 %

## 2023-12-19 ENCOUNTER — APPOINTMENT (OUTPATIENT)
Dept: PULMONOLOGY | Facility: CLINIC | Age: 68
End: 2023-12-19
Payer: MEDICARE

## 2023-12-19 VITALS — OXYGEN SATURATION: 96 % | HEART RATE: 80 BPM | DIASTOLIC BLOOD PRESSURE: 69 MMHG | SYSTOLIC BLOOD PRESSURE: 119 MMHG

## 2023-12-19 PROCEDURE — 99213 OFFICE O/P EST LOW 20 MIN: CPT

## 2023-12-20 NOTE — ADDENDUM
[FreeTextEntry1] : I, Feliciano Ayalalorrie, acted solely as a scribe for Dr. Angel Luis Light M.D. on this date 12/19/2023.   All medical record entries made by the Scribe were at my, Dr. Angel Luis Light M.D., direction and personally dictated by me on 12/19/2023. I have reviewed the chart and agree that the record accurately reflects my personal performance of the history, physical exam, assessment and plan. I have also personally directed, reviewed, and agreed with the chart.

## 2023-12-20 NOTE — ASSESSMENT
[FreeTextEntry1] : Ms. JOVITA WALLACE is an 68 year old female with obstructive sleep apnea. Patient was not able to achieve compliance based on portal data although she states that her machine was repaired 1 month ago and it could be that her machine is not currently synced properly and not transmitting data wirelessly. Will review data manually when she brings her machine to the office.

## 2023-12-20 NOTE — HISTORY OF PRESENT ILLNESS
[FreeTextEntry1] : JVOITA WALLACE is a 68 year old female, with history of Obstructive sleep apnea, who presents to the office for follow up evaluation. Patient reports of having symptoms of headaches and sinus issues. She states that she uses PAP on a daily basis with improvement to her symptoms. Patient reports that her machine was out of order and was repaired and sabrina back 1 month ago.

## 2023-12-23 ENCOUNTER — NON-APPOINTMENT (OUTPATIENT)
Age: 68
End: 2023-12-23

## 2024-01-08 ENCOUNTER — APPOINTMENT (OUTPATIENT)
Dept: ORTHOPEDIC SURGERY | Facility: CLINIC | Age: 69
End: 2024-01-08
Payer: MEDICARE

## 2024-01-08 VITALS — BODY MASS INDEX: 40.01 KG/M2 | WEIGHT: 264 LBS | HEIGHT: 68 IN

## 2024-01-08 PROCEDURE — 20610 DRAIN/INJ JOINT/BURSA W/O US: CPT | Mod: LT

## 2024-01-08 PROCEDURE — J3490M: CUSTOM | Mod: NC

## 2024-01-08 PROCEDURE — 99214 OFFICE O/P EST MOD 30 MIN: CPT | Mod: 25

## 2024-01-08 NOTE — IMAGING
[de-identified] : Left knee: ROM 45-80.  Medial and lateral tenderness.  Wheelchair/cane.  Right knee: ROM 45-80.  Medial and lateral tenderness.  Wheelchair/cane.

## 2024-01-08 NOTE — PROCEDURE
[Large Joint Injection] : Large joint injection [Left] : of the left [Knee] : knee [Pain] : pain [Inflammation] : inflammation [X-ray evidence of Osteoarthritis on this or prior visit] : x-ray evidence of Osteoarthritis on this or prior visit [Alcohol] : alcohol [Betadine] : betadine [Ethyl Chloride sprayed topically] : ethyl chloride sprayed topically [Sterile technique used] : sterile technique used [___ cc    32 units 5mg] : Zilretta ~Vcc of 32 units 5 mg  [] : Patient tolerated procedure well [Call if redness, pain or fever occur] : call if redness, pain or fever occur [Apply ice for 15min out of every hour for the next 12-24 hours as tolerated] : apply ice for 15 minutes out of every hour for the next 12-24 hours as tolerated [Previous OTC use and PT nontherapeutic] : patient has tried OTC's including aspirin, Ibuprofen, Aleve, etc or prescription NSAIDS, and/or exercises at home and/or physical therapy without satisfactory response [Patient had decreased mobility in the joint] : patient had decreased mobility in the joint [Risks, benefits, alternatives discussed / Verbal consent obtained] : the risks benefits, and alternatives have been discussed, and verbal consent was obtained [Morbid obesity] : morbid obesity [All ultrasound images have been permanently captured and stored accordingly in our picture archiving and communication system] : All ultrasound images have been permanently captured and stored accordingly in our picture archiving and communication system [Visualization of the needle and placement of injection was performed without complication] : visualization of the needle and placement of injection was performed without complication

## 2024-01-08 NOTE — HISTORY OF PRESENT ILLNESS
[Dull/Aching] : dull/aching [Stabbing] : stabbing [Throbbing] : throbbing [9] : 9 [7] : 7 [de-identified] : 1/8/24: Inj last time helped a lot but pain worsening over the past 1-2 weeks.  Previous doc: 10/9/23: B/L knee pain for many years, worsening.  Does not walk much due to pain.  Working on weight loss so she can have hernia repair later this year.  Had stem cell inj with 3 months relief.  Did not want to go back for more. 10/23/23: Adv b/l OA. LT knee Zilretta at last apt provided good relief. and did not affect her glucose -  Here for RT knee Zilretta today.  [] : no [FreeTextEntry1] : Laron Knee  [FreeTextEntry5] : 01/08/2024 pt states she i hving lot o pin since the lst visit

## 2024-01-08 NOTE — ASSESSMENT
[FreeTextEntry1] : Previous doc: 10/9/23: Severe OA both knees. Minimal ambulator, very poor function of knees at this time. She is trying to avoid TKA at this point and we discussed long term issues with and without surgery given her current knee function status. Trying to avoid cortisone due to diabetes. Offered HA vs zilretta - will try zilretta left knee today and return in 2 weeks for right knee if she wants. 10/23/23: Inj done today- tolerated well  1/8/24: Worsening pain recently, zilretta inj left knee tolerated well, will return in 2 weeks for right knee.

## 2024-01-08 NOTE — DISCUSSION/SUMMARY
[de-identified] : The patient was advised of the diagnosis.  The natural history of the pathology was explained in full to the patient in layman's terms. All questions were answered.  The risks and benefits of surgical and non-surgical treatment alternatives were explained in full to the patient.

## 2024-01-10 ENCOUNTER — APPOINTMENT (OUTPATIENT)
Dept: BARIATRICS | Facility: CLINIC | Age: 69
End: 2024-01-10
Payer: MEDICARE

## 2024-01-10 VITALS
OXYGEN SATURATION: 98 % | TEMPERATURE: 98.1 F | HEIGHT: 68 IN | SYSTOLIC BLOOD PRESSURE: 137 MMHG | DIASTOLIC BLOOD PRESSURE: 87 MMHG | WEIGHT: 278.5 LBS | HEART RATE: 58 BPM | BODY MASS INDEX: 42.21 KG/M2

## 2024-01-10 PROCEDURE — 99215 OFFICE O/P EST HI 40 MIN: CPT

## 2024-01-10 PROCEDURE — G0447 BEHAVIOR COUNSEL OBESITY 15M: CPT | Mod: 59

## 2024-01-10 NOTE — ASSESSMENT
[FreeTextEntry1] : Patient with multiple obesity related comorbidities. Continue to work on making lifestyle modifications. I have recommended she f/u with the RD for additional education.  We discussed ways she can incorporate exercise into her daily routine given her limitations such as upper body exercise and chair exercise. Resume PT and aqua therapy. At least 15 minutes was spent during the visit on obesity counseling.  Recommended to resume Rybelsus 3 mg daily to help with DM and weight loss. Then titrate to 7 mg daily. Discussed ways to help with constipation including adequate fiber and water intake.  New paperwork filled out for [employer] Patient Assistance Program  All of her questions were answered. Emotional support provided.  f/u in 10-12 weeks with covering provider f/u with RD in1  month

## 2024-01-10 NOTE — HISTORY OF PRESENT ILLNESS
[FreeTextEntry1] : 68 year old female with T2DM, HTN, HLD, Asthma, Thyroid nodules and Breast cancer presents for follow up of weight gain. She was referred for medical weight management by Dr. Galvin prior to repair of her large ventral hernia. She feels that she began struggling with her weight after coming to [place] about 30 years ago from [place]. She feels that her weight has made it difficult for her to ambulate. She is now using a wheelchair when outside the house and a walker inside. This has made it difficult for her to perform ADLs and has subsequently impacted her mental health. She does try to cook food but is limited as she feels tired. She reports she feels she has become "lazy". She tends to make foods that are simple to make like sandwich's.  She has MARTHA and has a CPAP but does not use it every night.  She has T2DM and takes Metformin 500 mg 1 tab BID. A1C is 6.4.  She has b/l thyroid nodules and recently had an FNA of larger 4.4 cm nodule which was benign. Second nodule was not biopsied.  7/21/23 Since her last visit, she has been taking Rybelsus 3 mg daily. She did not increase her dose as she was trying to use up the samples she had because the medication is expensive. Feels well and denies any GI side effects. Has BM every 2 days now. She sent the application for the patient assistance program but has not heard back. She has tried to incorporate some of the changes that were recommended by the RD. She started PT and has been going to aqua therapy weekly.  Recent labs were reviewed and discussed.   1/10/24 Since her last visit, she was doing well and her weight went down to 264. However, she stopped the Rybelsus d/t some constipation. Constipation resolved with dietary changes and drinking herbal tea recommended by PCP. She has been off Rybelsus for 4-6 weeks. She does admit to indulging during the holidays and regaining weight. She is motivated to restart. Stopped PT but plans to resume.  Recent labs were reviewed and discussed.

## 2024-01-10 NOTE — PHYSICAL EXAM
[Obese, well nourished, in no acute distress] : obese, well nourished, in no acute distress [Normal] : affect appropriate [de-identified] : thyroid mildly enlarged

## 2024-01-17 ENCOUNTER — RX RENEWAL (OUTPATIENT)
Age: 69
End: 2024-01-17

## 2024-01-17 RX ORDER — TRIAMCINOLONE ACETONIDE 1 MG/G
0.1 CREAM TOPICAL
Qty: 30 | Refills: 6 | Status: ACTIVE | COMMUNITY
Start: 2019-11-11 | End: 1900-01-01

## 2024-01-19 ENCOUNTER — OUTPATIENT (OUTPATIENT)
Dept: OUTPATIENT SERVICES | Facility: HOSPITAL | Age: 69
LOS: 1 days | Discharge: HOME | End: 2024-01-19

## 2024-01-19 DIAGNOSIS — Z90.711 ACQUIRED ABSENCE OF UTERUS WITH REMAINING CERVICAL STUMP: Chronic | ICD-10-CM

## 2024-01-19 DIAGNOSIS — Z87.828 PERSONAL HISTORY OF OTHER (HEALED) PHYSICAL INJURY AND TRAUMA: Chronic | ICD-10-CM

## 2024-01-19 DIAGNOSIS — K46.9 UNSPECIFIED ABDOMINAL HERNIA WITHOUT OBSTRUCTION OR GANGRENE: Chronic | ICD-10-CM

## 2024-01-19 DIAGNOSIS — Z98.891 HISTORY OF UTERINE SCAR FROM PREVIOUS SURGERY: Chronic | ICD-10-CM

## 2024-01-19 DIAGNOSIS — C50.919 MALIGNANT NEOPLASM OF UNSPECIFIED SITE OF UNSPECIFIED FEMALE BREAST: ICD-10-CM

## 2024-01-19 DIAGNOSIS — Z98.890 OTHER SPECIFIED POSTPROCEDURAL STATES: Chronic | ICD-10-CM

## 2024-01-22 ENCOUNTER — APPOINTMENT (OUTPATIENT)
Dept: ORTHOPEDIC SURGERY | Facility: CLINIC | Age: 69
End: 2024-01-22
Payer: MEDICARE

## 2024-01-22 VITALS — WEIGHT: 278 LBS | BODY MASS INDEX: 42.13 KG/M2 | HEIGHT: 68 IN

## 2024-01-22 PROCEDURE — 99214 OFFICE O/P EST MOD 30 MIN: CPT | Mod: 25

## 2024-01-22 PROCEDURE — 20610 DRAIN/INJ JOINT/BURSA W/O US: CPT | Mod: RT

## 2024-01-22 NOTE — DISCUSSION/SUMMARY
[de-identified] : The natural progression of Osteoarthritis was explained to the patient.  We discussed the possible treatment options from conservative to operative.  These included NSAIDS, Glucosamine and Chondrotin sulfate, and Physical Therapy as well different types of injections.  We also discussed that at some point they may progress to needed a TKA.  Information and pamphlets were given when appropriate.  The risks, benefits, contents and alternatives to injection were explained in full to the patient.  Risks outlined include but are not limited to infection, sepsis, bleeding, scarring, skin discoloration, temporary increase in pain, syncopal episode, failure to resolve symptoms, allergic reaction, flare reaction, permanent white skin discoloration, symptom recurrence, and elevation of blood sugar in diabetics.  Patient understood the risks.  All questions were answered.  After discussion of options, patient requested an injection.  Oral informed consent was obtained and sterile prep was done of the injection site.  Sterile technique was used to introduce the mixture.  Patient tolerated the procedure well.  Patient advised to ice the injection site this evening.  Signs and symptoms of infection reviewed and patient advised to call immediately for redness, fevers, and/or chills.  Entered by Becky Garza acting as a scribe.

## 2024-01-22 NOTE — PROCEDURE
[Large Joint Injection] : Large joint injection [Right] : of the right [Knee] : knee [Pain] : pain [Inflammation] : inflammation [X-ray evidence of Osteoarthritis on this or prior visit] : x-ray evidence of Osteoarthritis on this or prior visit [Alcohol] : alcohol [Betadine] : betadine [Ethyl Chloride sprayed topically] : ethyl chloride sprayed topically [Sterile technique used] : sterile technique used [] : Patient tolerated procedure well [___ cc    32 units 5mg] : Zilretta ~Vcc of 32 units 5 mg  [Call if redness, pain or fever occur] : call if redness, pain or fever occur [Apply ice for 15min out of every hour for the next 12-24 hours as tolerated] : apply ice for 15 minutes out of every hour for the next 12-24 hours as tolerated [Previous OTC use and PT nontherapeutic] : patient has tried OTC's including aspirin, Ibuprofen, Aleve, etc or prescription NSAIDS, and/or exercises at home and/or physical therapy without satisfactory response [Patient had decreased mobility in the joint] : patient had decreased mobility in the joint [Risks, benefits, alternatives discussed / Verbal consent obtained] : the risks benefits, and alternatives have been discussed, and verbal consent was obtained [Morbid obesity] : morbid obesity [Visualization of the needle and placement of injection was performed without complication] : visualization of the needle and placement of injection was performed without complication [All ultrasound images have been permanently captured and stored accordingly in our picture archiving and communication system] : All ultrasound images have been permanently captured and stored accordingly in our picture archiving and communication system

## 2024-01-22 NOTE — ASSESSMENT
[FreeTextEntry1] : Previous doc: 10/9/23: Severe OA both knees. Minimal ambulator, very poor function of knees at this time. She is trying to avoid TKA at this point and we discussed long term issues with and without surgery given her current knee function status. Trying to avoid cortisone due to diabetes. Offered HA vs zilretta - will try zilretta left knee today and return in 2 weeks for right knee if she wants. 10/23/23: Inj done today- tolerated well 1/8/24: Worsening pain recently, zilretta inj left knee tolerated well, will return in 2 weeks for right knee.  1/22/24: Zilretta right knee inj tolerated well.  No issue with glucose last time, will give b/l injection when pain returns in 3 months.

## 2024-01-22 NOTE — HISTORY OF PRESENT ILLNESS
[9] : 9 [7] : 7 [Stabbing] : stabbing [Dull/Aching] : dull/aching [Throbbing] : throbbing [de-identified] : 1/22/24: Here to f/up adv b/l knee OA- here for RT knee Zilretta. Had LT knee Zilretta at last visit with some relief- not as good as the first time she had injection.   Previous doc: 10/9/23: B/L knee pain for many years, worsening.  Does not walk much due to pain.  Working on weight loss so she can have hernia repair later this year.  Had stem cell inj with 3 months relief.  Did not want to go back for more. 10/23/23: Adv b/l OA. LT knee Zilretta at last apt provided good relief. and did not affect her glucose -  Here for RT knee Zilretta today. 1/8/24: Inj last time helped a lot but pain worsening over the past 1-2 weeks.  [] : no [FreeTextEntry1] : Laron Knee  [FreeTextEntry5] : pain increasing. was given csi felt slight relief.

## 2024-01-22 NOTE — IMAGING
[de-identified] : Left knee: ROM 45-80.  Medial and lateral tenderness.  Wheelchair/cane.  Right knee: ROM 45-80.  Medial and lateral tenderness.  Wheelchair/cane.

## 2024-01-29 ENCOUNTER — APPOINTMENT (OUTPATIENT)
Dept: HEMATOLOGY ONCOLOGY | Facility: CLINIC | Age: 69
End: 2024-01-29
Payer: MEDICARE

## 2024-01-29 VITALS
DIASTOLIC BLOOD PRESSURE: 66 MMHG | OXYGEN SATURATION: 97 % | HEART RATE: 66 BPM | SYSTOLIC BLOOD PRESSURE: 145 MMHG | BODY MASS INDEX: 39.84 KG/M2 | RESPIRATION RATE: 18 BRPM | WEIGHT: 262 LBS | TEMPERATURE: 98.6 F

## 2024-01-29 DIAGNOSIS — T45.1X5A PAIN IN UNSPECIFIED JOINT: ICD-10-CM

## 2024-01-29 DIAGNOSIS — M25.50 PAIN IN UNSPECIFIED JOINT: ICD-10-CM

## 2024-01-29 DIAGNOSIS — C50.911 MALIGNANT NEOPLASM OF UNSPECIFIED SITE OF RIGHT FEMALE BREAST: ICD-10-CM

## 2024-01-29 PROCEDURE — 99214 OFFICE O/P EST MOD 30 MIN: CPT

## 2024-01-29 RX ORDER — EXEMESTANE 25 MG/1
25 TABLET, FILM COATED ORAL
Qty: 90 | Refills: 1 | Status: ACTIVE | COMMUNITY
Start: 2024-01-29 | End: 1900-01-01

## 2024-01-29 NOTE — HISTORY OF PRESENT ILLNESS
[T: ___] : T[unfilled] [N: ___] : N[unfilled] [M: ___] : M[unfilled] [de-identified] : Ms. Debra Alva is a 67 year old female here for an evaluation of breast cancer. Her oncologic history is as follows:  She underwent routine breast imaging on 9/4/2021( BIRADS 0) which showed a 9 mm spiculated mass in the upper central right breast corresponding with the sonographic finding of an irregular hypoechoic at the 12:00, 3 cm FN right breast for which additional targeted imaging of right breast and axilla is rec. She underwent additional US imaging of the right breast on 11/12/2021 (BIRADS 5) which showed irregular hypoechoic mass measuring 5 mm in the right breast 12:00 for which US guided core biopsy is recommended    She underwent  2 site right breast core biopsy on 11/23/2021 the right breast showed 12 O'clock 3cmfn; breast parenchyma with multinucleated giant cells, foreign body material and scar formation; right breast, 11 O'clock, 13cmfn showed invasive moderately differentiated ductal carcinoma, Tiffanie score 6 /9, measuring 0.6 cm, ER+ 80%,  PgR + 95%, HER-2:Negative, Ductal carcinoma in situ, solid pattern with intermediate grade nuclear atypia, No Lymphovascular permeation seen The results at the first site, right breast 12:00 axis, 3 cm from the nipple are BENIGN AND DISCORDANT. The results at the second site, right breast 11:00 axis, 13 cm from the nipple, demonstrating sonographic mammographic concordance, are CONCORDANT.  She underwent  Right breast, upper outer quadrant, lumpectomy/ sentinel lymph node biopsy on 2/3/2022 which revealed invasive moderately differentiated ductal carcinoma,NST,  Tiffanie grad 2,  measuring  0.8 cm, Margin were negative.  Lymphovascular invasion was not seen.No DCIS was noted in this specimen but  solid, intermediate grade DCIS noted inprior core biopsy. SIX sentinel lymph node were removed and 0/6 were negative for metastasis.   She also underwent right breast subareolar mass, excisional biopsy which revealed Breast with focal dense fibrosis, fat necrosis and hemosiderin deposition consistent with organizing biopsy site scar. Adjacent areas consisted of foreign body giant cell reaction and chronic inflammation.  ODX 4   She uses a cane and walker at baseline at home. Here in wheel chair. She has weak knees 2/2 arthritis   12/2019 : DEXA normal  10/2023 She uses a cane and walker at baseline at home. Here in wheel chair. She has weak knees 2/2 arthritis . She is starting PT for knees. SHe uses stat bike. Today 10/2023 she reports bone pain overall has worsened. She has pain in her shoulders too. Concern for AI related s/e. Will give 3 weeks break. Check bone scan to r/o mets. She will hold anastrozole and I will f/u with her on the phone after bone scan  [de-identified] : In summary, Ms. JOVITA WALLACE is a 67 year old postmenopausal female with stage IA (T1b, N0, Mx) ER positive, CA positive, HER-2/rodrigo negative invasive ductal carcinoma of the right breast. She is status post lumpectomy on 2/23/2022. ODX 4. RT completed 5/2022. Anastrozole started 6/2022.. Exemestane 1/2024 1/2024 Patient was last seen October 2023.  She was complaining of worsening arthralgias and was recommended to hold anastrozole for 3 weeks.  A bone scan was done to rule out metastatic disease which was negative.  Her pain did not improve significantly after 3 weeks break so she continued to hold the medication.  She has been off for 3 months now.  Pain is significantly better now.  Bone scan ruled out metastatic disease.  We will switch to exemestane at this point.  She is also followed by orthopedics.  Side effects of exemestane were reviewed. mammogram - 9/2021, 1/2023 birads 2 12/2019, 3/2022 : DEXA normal pt works from home

## 2024-01-29 NOTE — ASSESSMENT
[FreeTextEntry1] : In summary, Ms. JOVITA WALLACE is a 67 year old postmenopausal female with stage IA (T1b, N0, Mx) ER positive, AK positive, HER-2/rodrigo negative invasive ductal carcinoma of the right breast. She is status post lumpectomy on 2/23/2022 and is scheduled to meet with Dr Arellano for radiation Oncology consultation. ODX 4. RT completed 5/2022. Anastrozole started 6/2022.  1. Breast ca- She uses a cane and walker at baseline at home. Here in wheel chair.  1/2024 Patient was last seen October 2023.  She was complaining of worsening arthralgias and was recommended to hold anastrozole for 3 weeks.  A bone scan was done to rule out metastatic disease which was negative.  Her pain did not improve significantly after 3 weeks break so she continued to hold the medication.  She has been off for 3 months now.  Pain is significantly better now.  Bone scan ruled out metastatic disease.  We will switch to exemestane at this point.  She is also followed by orthopedics.  Side effects of exemestane were reviewed. mammogram - 9/2021, 1/2023 birads 2. M/S ordered for 2024 2. Concern for worsening bone density and fractures due to anastrozole. Rec to continue calcium and vit D. DEXA 1-2 yrs. 3. High cholesterol/CAD risk factors: Concern for worsening cholesterol/CAD risk factors due to anastrozole. Pt is on Lipitor. Lipid profile annually. Life style modifications d/w her.  RTC 3 m   .

## 2024-01-29 NOTE — PHYSICAL EXAM
[Ambulatory and capable of all self care but unable to carry out any work activities] : Status 2- Ambulatory and capable of all self care but unable to carry out any work activities. Up and about more than 50% of waking hours [Obese] : obese [Normal] : affect appropriate [de-identified] : healed right breast and axillary scar

## 2024-01-29 NOTE — CONSULT LETTER
[Dear  ___] : Dear  [unfilled], [Consult Letter:] : I had the pleasure of evaluating your patient, [unfilled]. [Please see my note below.] : Please see my note below. [Consult Closing:] : Thank you very much for allowing me to participate in the care of this patient.  If you have any questions, please do not hesitate to contact me. [Sincerely,] : Sincerely, [FreeTextEntry2] : Dr. Russell Gold [FreeTextEntry3] : Alexa Brooks MD\par  Division of Medical Oncology and Hematology\par  [facility]\par  [employer] [facility]\par  [place], [place]

## 2024-02-22 ENCOUNTER — RX RENEWAL (OUTPATIENT)
Age: 69
End: 2024-02-22

## 2024-02-23 RX ORDER — TRIAMCINOLONE ACETONIDE 1 MG/G
0.1 CREAM TOPICAL
Qty: 80 | Refills: 0 | Status: ACTIVE | COMMUNITY
Start: 2023-09-12 | End: 1900-01-01

## 2024-03-19 ENCOUNTER — APPOINTMENT (OUTPATIENT)
Dept: INTERNAL MEDICINE | Facility: CLINIC | Age: 69
End: 2024-03-19
Payer: MEDICARE

## 2024-03-19 VITALS
TEMPERATURE: 96.2 F | HEIGHT: 68 IN | OXYGEN SATURATION: 98 % | RESPIRATION RATE: 18 BRPM | BODY MASS INDEX: 42.44 KG/M2 | SYSTOLIC BLOOD PRESSURE: 134 MMHG | WEIGHT: 280 LBS | HEART RATE: 70 BPM | DIASTOLIC BLOOD PRESSURE: 72 MMHG

## 2024-03-19 DIAGNOSIS — K43.9 VENTRAL HERNIA W/OUT OBSTRUCTION OR GANGRENE: ICD-10-CM

## 2024-03-19 DIAGNOSIS — R74.8 ABNORMAL LEVELS OF OTHER SERUM ENZYMES: ICD-10-CM

## 2024-03-19 PROCEDURE — 99215 OFFICE O/P EST HI 40 MIN: CPT

## 2024-03-19 PROCEDURE — G2211 COMPLEX E/M VISIT ADD ON: CPT

## 2024-03-19 PROCEDURE — 36415 COLL VENOUS BLD VENIPUNCTURE: CPT

## 2024-03-20 PROBLEM — K43.9 VENTRAL HERNIA: Status: ACTIVE | Noted: 2022-05-17

## 2024-03-20 PROBLEM — R74.8 ALKALINE PHOSPHATASE ELEVATION: Status: ACTIVE | Noted: 2023-12-09

## 2024-03-20 LAB
ALBUMIN SERPL ELPH-MCNC: 4 G/DL
ALP BLD-CCNC: 120 U/L
ALT SERPL-CCNC: 11 U/L
ANION GAP SERPL CALC-SCNC: 14 MMOL/L
AST SERPL-CCNC: 14 U/L
BASOPHILS # BLD AUTO: 0.04 K/UL
BASOPHILS NFR BLD AUTO: 0.6 %
BILIRUB SERPL-MCNC: 0.4 MG/DL
BUN SERPL-MCNC: 19 MG/DL
CALCIUM SERPL-MCNC: 9.7 MG/DL
CHLORIDE SERPL-SCNC: 96 MMOL/L
CO2 SERPL-SCNC: 29 MMOL/L
CREAT SERPL-MCNC: 0.82 MG/DL
EGFR: 77 ML/MIN/1.73M2
EOSINOPHIL # BLD AUTO: 0.33 K/UL
EOSINOPHIL NFR BLD AUTO: 4.9 %
ESTIMATED AVERAGE GLUCOSE: 146 MG/DL
GLUCOSE SERPL-MCNC: 120 MG/DL
HBA1C MFR BLD HPLC: 6.7 %
HCT VFR BLD CALC: 38.5 %
HGB BLD-MCNC: 12.8 G/DL
IMM GRANULOCYTES NFR BLD AUTO: 0.1 %
LYMPHOCYTES # BLD AUTO: 2.37 K/UL
LYMPHOCYTES NFR BLD AUTO: 35.1 %
MAN DIFF?: NORMAL
MCHC RBC-ENTMCNC: 28.6 PG
MCHC RBC-ENTMCNC: 33.2 GM/DL
MCV RBC AUTO: 86.1 FL
MONOCYTES # BLD AUTO: 0.39 K/UL
MONOCYTES NFR BLD AUTO: 5.8 %
NEUTROPHILS # BLD AUTO: 3.62 K/UL
NEUTROPHILS NFR BLD AUTO: 53.5 %
PLATELET # BLD AUTO: 229 K/UL
POTASSIUM SERPL-SCNC: 4.2 MMOL/L
PROT SERPL-MCNC: 6.8 G/DL
RBC # BLD: 4.47 M/UL
RBC # FLD: 13.8 %
SODIUM SERPL-SCNC: 139 MMOL/L
WBC # FLD AUTO: 6.76 K/UL

## 2024-03-20 NOTE — ASSESSMENT
[FreeTextEntry1] : Ataxia- djd knees- s/p inj, f/u with ortho DM2- chk fs qd, metformin, chk labs  optho- 2023- no retinopathy  Feet- no sores, occasional mild neuropathy. HLD- chk labs, cont atorv HTN- stable, cont atenolol, losartan, hct Glaucoma- f/u with optho, cont drops Bilat Knee pain- f/u with ortho Chronic Venous Insuff- low Na diet, hct.saw vasc. no tx at this time Morbid obesity- discussed low calorie diet and exercise as part of a weight loss plan, rybelsus7, titrate up to 14. Breast Cancer- onc noted. off arimidex Decr Hearing- ENT f/u cnstipation- fluids, start benefiber qd, colace 200mg qd, smooth move tead q3d, Monitor symptoms and report any changes or concerns. , GI f/u  abd hernia- saw surg, referred to weight loss program, started rybelsus.taking qod as causing constipation. lost some wt. surgery planned, discussed r/b/a to doing surgery now vs not doing surgery and risk of monitoring, including incarceration. Anemia- t iron 65mg qd, vit c 500mg qd, referred to GI, rpt labs Thyroid nodule- bx-neg for cancer elev alk phos- saw onc, total body bone scan neg. likely due to fatty liver pod eval Pt. was encouraged to do all relevant screening tests including but not limited to mammogram, gyn visit, colonoscopy, bone density, annual skin exam. Total time spent 40 min. ( 25 min was FTF and 15 min was chart review and documentation for a total of 40 min. ).

## 2024-03-20 NOTE — PHYSICAL EXAM
[No Acute Distress] : no acute distress [Well Nourished] : well nourished [Well-Appearing] : well-appearing [EOMI] : extraocular movements intact [Normal Sclera/Conjunctiva] : normal sclera/conjunctiva [PERRL] : pupils equal round and reactive to light [Normal Outer Ear/Nose] : the outer ears and nose were normal in appearance [Normal Oropharynx] : the oropharynx was normal [No JVD] : no jugular venous distention [Supple] : supple [No Lymphadenopathy] : no lymphadenopathy [No Respiratory Distress] : no respiratory distress  [Clear to Auscultation] : lungs were clear to auscultation bilaterally [No Accessory Muscle Use] : no accessory muscle use [Normal Rate] : normal rate  [Regular Rhythm] : with a regular rhythm [Normal S1, S2] : normal S1 and S2 [No Carotid Bruits] : no carotid bruits [No Murmur] : no murmur heard [No Abdominal Bruit] : a ~M bruit was not heard ~T in the abdomen [No Varicosities] : no varicosities [Pedal Pulses Present] : the pedal pulses are present [No Palpable Aorta] : no palpable aorta [No Edema] : there was no peripheral edema [No Extremity Clubbing/Cyanosis] : no extremity clubbing/cyanosis [Soft] : abdomen soft [Non Tender] : non-tender [Non-distended] : non-distended [Normal Bowel Sounds] : normal bowel sounds [No HSM] : no HSM [No CVA Tenderness] : no CVA  tenderness [No Spinal Tenderness] : no spinal tenderness [Grossly Normal Strength/Tone] : grossly normal strength/tone [Coordination Grossly Intact] : coordination grossly intact [Deep Tendon Reflexes (DTR)] : deep tendon reflexes were 2+ and symmetric [Normal Affect] : the affect was normal [Normal Insight/Judgement] : insight and judgment were intact [de-identified] : left thyroid prominence. [de-identified] : obese [de-identified] : right sided incisional hernia. large. [de-identified] : ble-/indurated, hyperpigmented .right sole with hyperpigment patch with scale [de-identified] : bilat knee djd. changes [de-identified] : slow, ambulates with a cane

## 2024-03-20 NOTE — HISTORY OF PRESENT ILLNESS
[FreeTextEntry1] : The patient is here for a follow up visit to review their medications and chronic medical conditions.  dm,htn,hld [de-identified] : Here for follow up visit. Doing well. Appetitie, sleep,bms,voiding, mood all ok.  Interim and relevant labs, imaging and consultations reviewed.

## 2024-03-24 ENCOUNTER — RX RENEWAL (OUTPATIENT)
Age: 69
End: 2024-03-24

## 2024-03-24 RX ORDER — LOSARTAN POTASSIUM 100 MG/1
100 TABLET, FILM COATED ORAL DAILY
Qty: 90 | Refills: 3 | Status: ACTIVE | COMMUNITY
Start: 2022-09-12 | End: 1900-01-01

## 2024-04-15 ENCOUNTER — APPOINTMENT (OUTPATIENT)
Dept: ENDOCRINOLOGY | Facility: CLINIC | Age: 69
End: 2024-04-15
Payer: MEDICARE

## 2024-04-15 VITALS
OXYGEN SATURATION: 98 % | HEART RATE: 70 BPM | HEIGHT: 68 IN | WEIGHT: 282 LBS | DIASTOLIC BLOOD PRESSURE: 70 MMHG | SYSTOLIC BLOOD PRESSURE: 111 MMHG | BODY MASS INDEX: 42.74 KG/M2 | TEMPERATURE: 97.3 F | RESPIRATION RATE: 16 BRPM

## 2024-04-15 PROCEDURE — 99214 OFFICE O/P EST MOD 30 MIN: CPT | Mod: 25

## 2024-04-15 PROCEDURE — 82962 GLUCOSE BLOOD TEST: CPT

## 2024-04-15 NOTE — ASSESSMENT
[FreeTextEntry1] : T2DM, BMI 42.88 -Emotional support provided. -[employer] assistance paperwork signed by Sherri printed and provided to patient as she reports she may have shredded the original provided documents.  -Consider switch to ozempic if assistance approved   -[facility] email address provided for direct access  -consider contrave if assistance program is not approved  RTC 3 months

## 2024-04-15 NOTE — PHYSICAL EXAM
[Obese, well nourished, in no acute distress] : obese, well nourished, in no acute distress [Normal] : affect appropriate [de-identified] : thyroid mildly enlarged

## 2024-04-15 NOTE — HISTORY OF PRESENT ILLNESS
[FreeTextEntry1] : ***Apr. 15, 2024*** MS Artie feels well overall denies new complaints, was placed on Rybelsus months ago but reports she has not been on rybelsus 3mg for few weeks due to high copay.   She has gained 20 lbs since january due to discontinuing medication and sedentary lifestyle as she reports the extra weight makes it difficult to walk.  She reports constipation on rybelsus.  BG in office is 112 mg/dL  HISTORY PER NP PETE ANA MARIA 68 year old female with T2DM, HTN, HLD, Asthma, Thyroid nodules and Breast cancer presents for follow up of weight gain. She was referred for medical weight management by Dr. Galvin prior to repair of her large ventral hernia. She feels that she began struggling with her weight after coming to [place] about 30 years ago from [place]. She feels that her weight has made it difficult for her to ambulate. She is now using a wheelchair when outside the house and a walker inside. This has made it difficult for her to perform ADLs and has subsequently impacted her mental health. She does try to cook food but is limited as she feels tired. She reports she feels she has become "lazy". She tends to make foods that are simple to make like sandwich's.  She has MARTHA and has a CPAP but does not use it every night.  She has T2DM and takes Metformin 500 mg 1 tab BID. A1C is 6.4.  She has b/l thyroid nodules and recently had an FNA of larger 4.4 cm nodule which was benign. Second nodule was not biopsied.  7/21/23 Since her last visit, she has been taking Rybelsus 3 mg daily. She did not increase her dose as she was trying to use up the samples she had because the medication is expensive. Feels well and denies any GI side effects. Has BM every 2 days now. She sent the application for the patient assistance program but has not heard back. She has tried to incorporate some of the changes that were recommended by the RD. She started PT and has been going to [facility] therapy weekly.  Recent labs were reviewed and discussed.   1/10/24 Since her last visit, she was doing well and her weight went down to 264. However, she stopped the Rybelsus d/t some constipation. Constipation resolved with dietary changes and drinking herbal tea recommended by PCP. She has been off Rybelsus for 4-6 weeks. She does admit to indulging during the holidays and regaining weight. She is motivated to restart. Stopped PT but plans to resume.  Recent labs were reviewed and discussed.

## 2024-04-16 LAB — GLUCOSE BLDC GLUCOMTR-MCNC: 112

## 2024-04-28 ENCOUNTER — RX RENEWAL (OUTPATIENT)
Age: 69
End: 2024-04-28

## 2024-04-28 RX ORDER — ATORVASTATIN CALCIUM 10 MG/1
10 TABLET, FILM COATED ORAL
Qty: 90 | Refills: 2 | Status: ACTIVE | COMMUNITY
Start: 2018-01-14 | End: 1900-01-01

## 2024-05-07 ENCOUNTER — APPOINTMENT (OUTPATIENT)
Dept: ORTHOPEDIC SURGERY | Facility: CLINIC | Age: 69
End: 2024-05-07

## 2024-05-28 ENCOUNTER — APPOINTMENT (OUTPATIENT)
Dept: ORTHOPEDIC SURGERY | Facility: CLINIC | Age: 69
End: 2024-05-28
Payer: MEDICARE

## 2024-05-28 VITALS — HEIGHT: 67 IN | BODY MASS INDEX: 44.26 KG/M2 | WEIGHT: 282 LBS

## 2024-05-28 DIAGNOSIS — M17.0 BILATERAL PRIMARY OSTEOARTHRITIS OF KNEE: ICD-10-CM

## 2024-05-28 PROCEDURE — 99214 OFFICE O/P EST MOD 30 MIN: CPT | Mod: 25

## 2024-05-28 PROCEDURE — 20610 DRAIN/INJ JOINT/BURSA W/O US: CPT | Mod: 50

## 2024-05-28 NOTE — HISTORY OF PRESENT ILLNESS
[8] : 8 [6] : 6 [Dull/Aching] : dull/aching [] : yes [de-identified] : 5/28/24: Last injections helped but not as good as previously.  Previous doc: 10/9/23: B/L knee pain for many years, worsening.  Does not walk much due to pain.  Working on weight loss so she can have hernia repair later this year.  Had stem cell inj with 3 months relief.  Did not want to go back for more. 10/23/23: Adv b/l OA. LT knee Zilretta at last apt provided good relief. and did not affect her glucose -  Here for RT knee Zilretta today. 1/8/24: Inj last time helped a lot but pain worsening over the past 1-2 weeks.  1/22/24: Here to f/up adv b/l knee OA- here for RT knee Zilretta. Had LT knee Zilretta at last visit with some relief- not as good as the first time she had injection.  [FreeTextEntry5] : pain worse L>R pain

## 2024-05-28 NOTE — PROCEDURE
[Large Joint Injection] : Large joint injection [Knee] : knee [Pain] : pain [Inflammation] : inflammation [X-ray evidence of Osteoarthritis on this or prior visit] : x-ray evidence of Osteoarthritis on this or prior visit [Alcohol] : alcohol [Betadine] : betadine [Ethyl Chloride sprayed topically] : ethyl chloride sprayed topically [Sterile technique used] : sterile technique used [___ cc    32 units 5mg] : Zilretta ~Vcc of 32 units 5 mg  [] : Patient tolerated procedure well [Call if redness, pain or fever occur] : call if redness, pain or fever occur [Apply ice for 15min out of every hour for the next 12-24 hours as tolerated] : apply ice for 15 minutes out of every hour for the next 12-24 hours as tolerated [Previous OTC use and PT nontherapeutic] : patient has tried OTC's including aspirin, Ibuprofen, Aleve, etc or prescription NSAIDS, and/or exercises at home and/or physical therapy without satisfactory response [Patient had decreased mobility in the joint] : patient had decreased mobility in the joint [Risks, benefits, alternatives discussed / Verbal consent obtained] : the risks benefits, and alternatives have been discussed, and verbal consent was obtained [Morbid obesity] : morbid obesity [All ultrasound images have been permanently captured and stored accordingly in our picture archiving and communication system] : All ultrasound images have been permanently captured and stored accordingly in our picture archiving and communication system [Visualization of the needle and placement of injection was performed without complication] : visualization of the needle and placement of injection was performed without complication [Bilateral] : bilaterally of the

## 2024-05-28 NOTE — ASSESSMENT
[FreeTextEntry1] : Previous doc: 10/9/23: Severe OA both knees. Minimal ambulator, very poor function of knees at this time. She is trying to avoid TKA at this point and we discussed long term issues with and without surgery given her current knee function status. Trying to avoid cortisone due to diabetes. Offered HA vs zilretta - will try zilretta left knee today and return in 2 weeks for right knee if she wants. 10/23/23: Inj done today- tolerated well 1/8/24: Worsening pain recently, zilretta inj left knee tolerated well, will return in 2 weeks for right knee. 1/22/24: Zilretta right knee inj tolerated well.  No issue with glucose last time, will give b/l injection when pain returns in 3 months.  5/28/24: Worsening pain, repeat zilretta both knees today tolerated well.

## 2024-06-02 ENCOUNTER — RX RENEWAL (OUTPATIENT)
Age: 69
End: 2024-06-02

## 2024-06-02 RX ORDER — METFORMIN HYDROCHLORIDE 1000 MG/1
1000 TABLET, COATED ORAL
Qty: 180 | Refills: 0 | Status: ACTIVE | COMMUNITY
Start: 2023-04-16 | End: 1900-01-01

## 2024-06-05 ENCOUNTER — APPOINTMENT (OUTPATIENT)
Dept: ULTRASOUND IMAGING | Facility: IMAGING CENTER | Age: 69
End: 2024-06-05
Payer: MEDICARE

## 2024-06-05 ENCOUNTER — OUTPATIENT (OUTPATIENT)
Dept: OUTPATIENT SERVICES | Facility: HOSPITAL | Age: 69
LOS: 1 days | End: 2024-06-05
Payer: MEDICARE

## 2024-06-05 ENCOUNTER — APPOINTMENT (OUTPATIENT)
Dept: RADIOLOGY | Facility: IMAGING CENTER | Age: 69
End: 2024-06-05
Payer: MEDICARE

## 2024-06-05 ENCOUNTER — RESULT REVIEW (OUTPATIENT)
Age: 69
End: 2024-06-05

## 2024-06-05 ENCOUNTER — APPOINTMENT (OUTPATIENT)
Dept: MAMMOGRAPHY | Facility: IMAGING CENTER | Age: 69
End: 2024-06-05
Payer: MEDICARE

## 2024-06-05 DIAGNOSIS — Z98.890 OTHER SPECIFIED POSTPROCEDURAL STATES: Chronic | ICD-10-CM

## 2024-06-05 DIAGNOSIS — C50.911 MALIGNANT NEOPLASM OF UNSPECIFIED SITE OF RIGHT FEMALE BREAST: ICD-10-CM

## 2024-06-05 DIAGNOSIS — K46.9 UNSPECIFIED ABDOMINAL HERNIA WITHOUT OBSTRUCTION OR GANGRENE: Chronic | ICD-10-CM

## 2024-06-05 PROCEDURE — 77066 DX MAMMO INCL CAD BI: CPT | Mod: 26

## 2024-06-05 PROCEDURE — G0279: CPT | Mod: 26

## 2024-06-05 PROCEDURE — G0279: CPT

## 2024-06-05 PROCEDURE — 76641 ULTRASOUND BREAST COMPLETE: CPT | Mod: 26,50,GY

## 2024-06-05 PROCEDURE — 76641 ULTRASOUND BREAST COMPLETE: CPT

## 2024-06-05 PROCEDURE — 77066 DX MAMMO INCL CAD BI: CPT

## 2024-06-14 ENCOUNTER — APPOINTMENT (OUTPATIENT)
Dept: RADIOLOGY | Facility: IMAGING CENTER | Age: 69
End: 2024-06-14

## 2024-06-14 ENCOUNTER — APPOINTMENT (OUTPATIENT)
Dept: MAMMOGRAPHY | Facility: IMAGING CENTER | Age: 69
End: 2024-06-14

## 2024-06-18 ENCOUNTER — APPOINTMENT (OUTPATIENT)
Dept: INTERNAL MEDICINE | Facility: CLINIC | Age: 69
End: 2024-06-18
Payer: MEDICARE

## 2024-06-18 VITALS
OXYGEN SATURATION: 97 % | RESPIRATION RATE: 18 BRPM | TEMPERATURE: 97.3 F | HEART RATE: 74 BPM | BODY MASS INDEX: 43.16 KG/M2 | WEIGHT: 275 LBS | DIASTOLIC BLOOD PRESSURE: 74 MMHG | HEIGHT: 67 IN | SYSTOLIC BLOOD PRESSURE: 136 MMHG

## 2024-06-18 DIAGNOSIS — E66.01 MORBID (SEVERE) OBESITY DUE TO EXCESS CALORIES: ICD-10-CM

## 2024-06-18 DIAGNOSIS — E78.5 HYPERLIPIDEMIA, UNSPECIFIED: ICD-10-CM

## 2024-06-18 DIAGNOSIS — C50.919 MALIGNANT NEOPLASM OF UNSPECIFIED SITE OF UNSPECIFIED FEMALE BREAST: ICD-10-CM

## 2024-06-18 DIAGNOSIS — E11.9 TYPE 2 DIABETES MELLITUS W/OUT COMPLICATIONS: ICD-10-CM

## 2024-06-18 DIAGNOSIS — I10 ESSENTIAL (PRIMARY) HYPERTENSION: ICD-10-CM

## 2024-06-18 DIAGNOSIS — E11.42 TYPE 2 DIABETES MELLITUS WITH DIABETIC POLYNEUROPATHY: ICD-10-CM

## 2024-06-18 DIAGNOSIS — G47.33 OBSTRUCTIVE SLEEP APNEA (ADULT) (PEDIATRIC): ICD-10-CM

## 2024-06-18 DIAGNOSIS — R42 DIZZINESS AND GIDDINESS: ICD-10-CM

## 2024-06-18 DIAGNOSIS — M48.062 SPINAL STENOSIS, LUMBAR REGION WITH NEUROGENIC CLAUDICATION: ICD-10-CM

## 2024-06-18 DIAGNOSIS — I87.2 VENOUS INSUFFICIENCY (CHRONIC) (PERIPHERAL): ICD-10-CM

## 2024-06-18 PROCEDURE — 36415 COLL VENOUS BLD VENIPUNCTURE: CPT

## 2024-06-18 PROCEDURE — 99215 OFFICE O/P EST HI 40 MIN: CPT

## 2024-06-18 PROCEDURE — G2211 COMPLEX E/M VISIT ADD ON: CPT

## 2024-06-18 RX ORDER — SEMAGLUTIDE 0.68 MG/ML
2 INJECTION, SOLUTION SUBCUTANEOUS
Qty: 1 | Refills: 1 | Status: DISCONTINUED | COMMUNITY
Start: 2024-04-15 | End: 2024-06-18

## 2024-06-18 RX ORDER — HYDROCHLOROTHIAZIDE 25 MG/1
25 TABLET ORAL
Qty: 90 | Refills: 3 | Status: ACTIVE | COMMUNITY
Start: 2022-07-15

## 2024-06-18 NOTE — PHYSICAL EXAM
[No Acute Distress] : no acute distress [Well Nourished] : well nourished [Well-Appearing] : well-appearing [Normal Sclera/Conjunctiva] : normal sclera/conjunctiva [PERRL] : pupils equal round and reactive to light [EOMI] : extraocular movements intact [Normal Outer Ear/Nose] : the outer ears and nose were normal in appearance [Normal Oropharynx] : the oropharynx was normal [No JVD] : no jugular venous distention [No Lymphadenopathy] : no lymphadenopathy [Supple] : supple [No Respiratory Distress] : no respiratory distress  [No Accessory Muscle Use] : no accessory muscle use [Clear to Auscultation] : lungs were clear to auscultation bilaterally [Normal Rate] : normal rate  [Regular Rhythm] : with a regular rhythm [Normal S1, S2] : normal S1 and S2 [No Murmur] : no murmur heard [No Carotid Bruits] : no carotid bruits [No Abdominal Bruit] : a ~M bruit was not heard ~T in the abdomen [No Varicosities] : no varicosities [Pedal Pulses Present] : the pedal pulses are present [No Edema] : there was no peripheral edema [No Palpable Aorta] : no palpable aorta [No Extremity Clubbing/Cyanosis] : no extremity clubbing/cyanosis [Non Tender] : non-tender [Soft] : abdomen soft [Non-distended] : non-distended [No HSM] : no HSM [Normal Bowel Sounds] : normal bowel sounds [No CVA Tenderness] : no CVA  tenderness [No Spinal Tenderness] : no spinal tenderness [Grossly Normal Strength/Tone] : grossly normal strength/tone [Coordination Grossly Intact] : coordination grossly intact [Deep Tendon Reflexes (DTR)] : deep tendon reflexes were 2+ and symmetric [Normal Affect] : the affect was normal [Normal Insight/Judgement] : insight and judgment were intact [de-identified] : obese [de-identified] : left thyroid prominence. [de-identified] : right sided incisional hernia. large. [de-identified] : bilat knee djd. changes [de-identified] : ble-/indurated, hyperpigmented .right sole with hyperpigment patch with scale [de-identified] : slow, ambulates with a cane

## 2024-06-18 NOTE — ASSESSMENT
[FreeTextEntry1] : Ataxia- djd knees- s/p inj, f/u with ortho DM2- chk fs qd, metformin, chk labs  optho- 2023- no retinopathy  Feet- no sores, occasional mild neuropathy. HLD- chk labs, cont atorv HTN- stable, cont atenolol, losartan, hct- decr hct to 1 qd from 2 qd and change atenolol to qhs from qam to see if lightheaded feeling resolves Glaucoma- f/u with optho, cont drops Bilat Knee pain- f/u with ortho Chronic Venous Insuff- low Na diet, hct.saw vasc. no tx at this time Morbid obesity- discussed low calorie diet and exercise as part of a weight loss plan, rybelsus7, titrate up to 14. Breast Cancer- onc noted. off arimidex Decr Hearing- ENT f/u cnstipation- fluids, start benefiber qd, colace 200mg qd, smooth move tead q3d, Monitor symptoms and report any changes or concerns. , GI f/u  abd hernia- saw surg, referred to weight loss program, started rybelsus.taking qod as causing constipation. lost some wt. surgery planned, discussed r/b/a to doing surgery now vs not doing surgery and risk of monitoring, including incarceration. Anemia- t iron 65mg qd, vit c 500mg qd, referred to GI, rpt labs Thyroid nodule- bx-neg for cancer elev alk phos- saw onc, total body bone scan neg. likely due to fatty liver pod eval Pt. was encouraged to do all relevant screening tests including but not limited to mammogram, gyn visit, colonoscopy, bone density, annual skin exam. Total time spent 40 min. ( 25 min was FTF and 15 min was chart review and documentation for a total of 40 min. ).

## 2024-06-18 NOTE — HISTORY OF PRESENT ILLNESS
[FreeTextEntry1] : The patient is here for a follow up visit to review their medications and chronic medical conditions.  dm,htn,hld [de-identified] : intermit lightheadedness few days a week, each episode lasts up to an hour. past few wks/mos.  Otherwise feels good. Appet, sleep, bms, voiding, mood all ok. no pain.  Reviewed all interim as well as relevant prior consultations, labs and radiological studies.

## 2024-06-24 LAB
ALBUMIN SERPL ELPH-MCNC: 4.1 G/DL
ALP BLD-CCNC: 124 U/L
ALT SERPL-CCNC: 14 U/L
ANION GAP SERPL CALC-SCNC: 15 MMOL/L
AST SERPL-CCNC: 16 U/L
BASOPHILS # BLD AUTO: 0.03 K/UL
BASOPHILS NFR BLD AUTO: 0.4 %
BILIRUB SERPL-MCNC: 0.4 MG/DL
BUN SERPL-MCNC: 16 MG/DL
CALCIUM SERPL-MCNC: 9.7 MG/DL
CHLORIDE SERPL-SCNC: 101 MMOL/L
CHOLEST SERPL-MCNC: 154 MG/DL
CO2 SERPL-SCNC: 30 MMOL/L
CREAT SERPL-MCNC: 0.74 MG/DL
EGFR: 88 ML/MIN/1.73M2
EOSINOPHIL # BLD AUTO: 0.07 K/UL
EOSINOPHIL NFR BLD AUTO: 0.9 %
ESTIMATED AVERAGE GLUCOSE: 143 MG/DL
GLUCOSE SERPL-MCNC: 154 MG/DL
HBA1C MFR BLD HPLC: 6.6 %
HCT VFR BLD CALC: 40.5 %
HDLC SERPL-MCNC: 46 MG/DL
HGB BLD-MCNC: 13 G/DL
IMM GRANULOCYTES NFR BLD AUTO: 0.3 %
LDLC SERPL CALC-MCNC: 85 MG/DL
LYMPHOCYTES # BLD AUTO: 2.62 K/UL
LYMPHOCYTES NFR BLD AUTO: 34.3 %
MAN DIFF?: NORMAL
MCHC RBC-ENTMCNC: 29.3 PG
MCHC RBC-ENTMCNC: 32.1 GM/DL
MCV RBC AUTO: 91.2 FL
MONOCYTES # BLD AUTO: 0.43 K/UL
MONOCYTES NFR BLD AUTO: 5.6 %
NEUTROPHILS # BLD AUTO: 4.46 K/UL
NEUTROPHILS NFR BLD AUTO: 58.5 %
NONHDLC SERPL-MCNC: 108 MG/DL
PLATELET # BLD AUTO: 255 K/UL
POTASSIUM SERPL-SCNC: 4 MMOL/L
PROT SERPL-MCNC: 6.7 G/DL
RBC # BLD: 4.44 M/UL
RBC # FLD: 14.6 %
SODIUM SERPL-SCNC: 146 MMOL/L
TRIGL SERPL-MCNC: 130 MG/DL
TSH SERPL-ACNC: 0.99 UIU/ML
WBC # FLD AUTO: 7.63 K/UL

## 2024-07-03 ENCOUNTER — OUTPATIENT (OUTPATIENT)
Dept: OUTPATIENT SERVICES | Facility: HOSPITAL | Age: 69
LOS: 1 days | Discharge: HOME | End: 2024-07-03

## 2024-07-03 DIAGNOSIS — C50.919 MALIGNANT NEOPLASM OF UNSPECIFIED SITE OF UNSPECIFIED FEMALE BREAST: ICD-10-CM

## 2024-07-03 DIAGNOSIS — Z87.828 PERSONAL HISTORY OF OTHER (HEALED) PHYSICAL INJURY AND TRAUMA: Chronic | ICD-10-CM

## 2024-07-03 DIAGNOSIS — K46.9 UNSPECIFIED ABDOMINAL HERNIA WITHOUT OBSTRUCTION OR GANGRENE: Chronic | ICD-10-CM

## 2024-07-03 DIAGNOSIS — Z98.890 OTHER SPECIFIED POSTPROCEDURAL STATES: Chronic | ICD-10-CM

## 2024-07-03 DIAGNOSIS — Z98.891 HISTORY OF UTERINE SCAR FROM PREVIOUS SURGERY: Chronic | ICD-10-CM

## 2024-07-03 DIAGNOSIS — Z90.711 ACQUIRED ABSENCE OF UTERUS WITH REMAINING CERVICAL STUMP: Chronic | ICD-10-CM

## 2024-07-07 ENCOUNTER — NON-APPOINTMENT (OUTPATIENT)
Age: 69
End: 2024-07-07

## 2024-07-08 ENCOUNTER — APPOINTMENT (OUTPATIENT)
Dept: HEMATOLOGY ONCOLOGY | Facility: CLINIC | Age: 69
End: 2024-07-08
Payer: MEDICARE

## 2024-07-08 VITALS
OXYGEN SATURATION: 94 % | WEIGHT: 272 LBS | TEMPERATURE: 98.2 F | SYSTOLIC BLOOD PRESSURE: 134 MMHG | HEART RATE: 78 BPM | BODY MASS INDEX: 42.6 KG/M2 | DIASTOLIC BLOOD PRESSURE: 75 MMHG | RESPIRATION RATE: 18 BRPM

## 2024-07-08 DIAGNOSIS — C50.911 MALIGNANT NEOPLASM OF UNSPECIFIED SITE OF RIGHT FEMALE BREAST: ICD-10-CM

## 2024-07-08 PROCEDURE — 99214 OFFICE O/P EST MOD 30 MIN: CPT

## 2024-07-08 PROCEDURE — G2211 COMPLEX E/M VISIT ADD ON: CPT

## 2024-08-12 ENCOUNTER — RX RENEWAL (OUTPATIENT)
Age: 69
End: 2024-08-12

## 2024-08-12 RX ORDER — FLUTICASONE PROPIONATE 110 UG/1
110 AEROSOL, METERED RESPIRATORY (INHALATION)
Qty: 12 | Refills: 4 | Status: ACTIVE | COMMUNITY
Start: 2024-08-12 | End: 1900-01-01

## 2024-09-03 ENCOUNTER — APPOINTMENT (OUTPATIENT)
Dept: ORTHOPEDIC SURGERY | Facility: CLINIC | Age: 69
End: 2024-09-03
Payer: MEDICARE

## 2024-09-03 VITALS — BODY MASS INDEX: 42.69 KG/M2 | WEIGHT: 272 LBS | HEIGHT: 67 IN

## 2024-09-03 DIAGNOSIS — M17.0 BILATERAL PRIMARY OSTEOARTHRITIS OF KNEE: ICD-10-CM

## 2024-09-03 PROCEDURE — 99213 OFFICE O/P EST LOW 20 MIN: CPT | Mod: 25

## 2024-09-03 PROCEDURE — 20611 DRAIN/INJ JOINT/BURSA W/US: CPT | Mod: 50

## 2024-09-03 NOTE — ASSESSMENT
[FreeTextEntry1] : Previous doc: 10/9/23: Severe OA both knees. Minimal ambulator, very poor function of knees at this time. She is trying to avoid TKA at this point and we discussed long term issues with and without surgery given her current knee function status. Trying to avoid cortisone due to diabetes. Offered HA vs zilretta - will try zilretta left knee today and return in 2 weeks for right knee if she wants. 10/23/23: Inj done today- tolerated well 1/8/24: Worsening pain recently, zilretta inj left knee tolerated well, will return in 2 weeks for right knee. 1/22/24: Zilretta right knee inj tolerated well.  No issue with glucose last time, will give b/l injection when pain returns in 3 months. 5/28/24: Worsening pain, repeat zilretta both knees today tolerated well.  9/3/24: Repeat Zilretta bilateral knees today performed by BANDAR Horta, tolerated well

## 2024-09-03 NOTE — IMAGING
[de-identified] : Left knee: ROM 45-80.  Medial and lateral tenderness.  Wheelchair/cane.  Right knee: ROM 45-80.  Medial and lateral tenderness.  Wheelchair/cane.

## 2024-09-03 NOTE — DISCUSSION/SUMMARY
[de-identified] : The patient was advised of the diagnosis.  The natural history of the pathology was explained in full to the patient in layman's terms. All questions were answered.  The risks and benefits of surgical and non-surgical treatment alternatives were explained in full to the patient.  The natural progression of Osteoarthritis was explained to the patient.  We discussed the possible treatment options from conservative to operative.  These included NSAIDS, Glucosamine and Chondrotin sulfate, and Physical Therapy as well different types of injections.  We also discussed that at some point they may progress to needed a TKA.  Information and pamphlets were given.  The risks, benefits, contents and alternatives to injection were explained in full to the patient.  Risks outlined include but are not limited to infection, sepsis, bleeding, scarring, skin discoloration, temporary increase in pain, syncopal episode, failure to resolve symptoms, allergic reaction, flare reaction, permanent white skin discoloration, symptom recurrence, and elevation of blood sugar in diabetics.  Patient understood the risks.  All questions were answered.  After discussion of options, patient requested an injection.  Oral informed consent was obtained and sterile prep was done of the injection site.  Sterile technique was used to introduce the mixture.  Patient tolerated the procedure well.  Patient advised to ice the injection site this evening.  Signs and symptoms of infection reviewed and patient advised to call immediately for redness, fevers, and/or chills.

## 2024-09-03 NOTE — PROCEDURE
[Large Joint Injection] : Large joint injection [Bilateral] : bilaterally of the [Knee] : knee [Pain] : pain [Inflammation] : inflammation [X-ray evidence of Osteoarthritis on this or prior visit] : x-ray evidence of Osteoarthritis on this or prior visit [Alcohol] : alcohol [Betadine] : betadine [Ethyl Chloride sprayed topically] : ethyl chloride sprayed topically [Sterile technique used] : sterile technique used [___ cc    32 units 5mg] : Zilretta ~Vcc of 32 units 5 mg  [] : Patient tolerated procedure well [Call if redness, pain or fever occur] : call if redness, pain or fever occur [Apply ice for 15min out of every hour for the next 12-24 hours as tolerated] : apply ice for 15 minutes out of every hour for the next 12-24 hours as tolerated [Previous OTC use and PT nontherapeutic] : patient has tried OTC's including aspirin, Ibuprofen, Aleve, etc or prescription NSAIDS, and/or exercises at home and/or physical therapy without satisfactory response [Patient had decreased mobility in the joint] : patient had decreased mobility in the joint [Risks, benefits, alternatives discussed / Verbal consent obtained] : the risks benefits, and alternatives have been discussed, and verbal consent was obtained [Morbid obesity] : morbid obesity [All ultrasound images have been permanently captured and stored accordingly in our picture archiving and communication system] : All ultrasound images have been permanently captured and stored accordingly in our picture archiving and communication system [Visualization of the needle and placement of injection was performed without complication] : visualization of the needle and placement of injection was performed without complication

## 2024-09-03 NOTE — HISTORY OF PRESENT ILLNESS
[de-identified] : 9/3/24: f/up bilateral knees. Had some relief from Zilretta last visit, but only worked for a few weeks  Previous doc: 10/9/23: B/L knee pain for many years, worsening.  Does not walk much due to pain.  Working on weight loss so she can have hernia repair later this year.  Had stem cell inj with 3 months relief.  Did not want to go back for more. 10/23/23: Adv b/l OA. LT knee Zilretta at last apt provided good relief. and did not affect her glucose -  Here for RT knee Zilretta today. 1/8/24: Inj last time helped a lot but pain worsening over the past 1-2 weeks.  1/22/24: Here to f/up adv b/l knee OA- here for RT knee Zilretta. Had LT knee Zilretta at last visit with some relief- not as good as the first time she had injection.  5/28/24: Last injections helped but not as good as previously. [FreeTextEntry5] : pain  [de-identified] : doing therapy

## 2024-09-16 ENCOUNTER — APPOINTMENT (OUTPATIENT)
Dept: INTERNAL MEDICINE | Facility: CLINIC | Age: 69
End: 2024-09-16
Payer: MEDICARE

## 2024-09-16 VITALS
HEART RATE: 74 BPM | SYSTOLIC BLOOD PRESSURE: 132 MMHG | BODY MASS INDEX: 42.38 KG/M2 | TEMPERATURE: 97.8 F | WEIGHT: 270 LBS | DIASTOLIC BLOOD PRESSURE: 70 MMHG | OXYGEN SATURATION: 96 % | HEIGHT: 67 IN | RESPIRATION RATE: 18 BRPM

## 2024-09-16 DIAGNOSIS — M48.062 SPINAL STENOSIS, LUMBAR REGION WITH NEUROGENIC CLAUDICATION: ICD-10-CM

## 2024-09-16 DIAGNOSIS — I87.2 VENOUS INSUFFICIENCY (CHRONIC) (PERIPHERAL): ICD-10-CM

## 2024-09-16 DIAGNOSIS — I10 ESSENTIAL (PRIMARY) HYPERTENSION: ICD-10-CM

## 2024-09-16 DIAGNOSIS — J45.909 UNSPECIFIED ASTHMA, UNCOMPLICATED: ICD-10-CM

## 2024-09-16 DIAGNOSIS — E11.9 TYPE 2 DIABETES MELLITUS W/OUT COMPLICATIONS: ICD-10-CM

## 2024-09-16 DIAGNOSIS — G47.33 OBSTRUCTIVE SLEEP APNEA (ADULT) (PEDIATRIC): ICD-10-CM

## 2024-09-16 DIAGNOSIS — E66.01 MORBID (SEVERE) OBESITY DUE TO EXCESS CALORIES: ICD-10-CM

## 2024-09-16 DIAGNOSIS — E78.5 HYPERLIPIDEMIA, UNSPECIFIED: ICD-10-CM

## 2024-09-16 DIAGNOSIS — C50.919 MALIGNANT NEOPLASM OF UNSPECIFIED SITE OF UNSPECIFIED FEMALE BREAST: ICD-10-CM

## 2024-09-16 DIAGNOSIS — E11.42 TYPE 2 DIABETES MELLITUS WITH DIABETIC POLYNEUROPATHY: ICD-10-CM

## 2024-09-16 PROCEDURE — 99215 OFFICE O/P EST HI 40 MIN: CPT

## 2024-09-16 PROCEDURE — G2211 COMPLEX E/M VISIT ADD ON: CPT

## 2024-09-17 NOTE — HISTORY OF PRESENT ILLNESS
[FreeTextEntry1] : The patient is here for a follow up visit to review their medications and chronic medical conditions.  dm,htn,hld [de-identified] : Here for follow up visit. Doing well. Appetitie, sleep,bms,voiding, mood all ok.  Interim and relevant labs, imaging and consultations reviewed.

## 2024-09-17 NOTE — PHYSICAL EXAM
[No Acute Distress] : no acute distress [Well Nourished] : well nourished [Well-Appearing] : well-appearing [Normal Sclera/Conjunctiva] : normal sclera/conjunctiva [PERRL] : pupils equal round and reactive to light [EOMI] : extraocular movements intact [Normal Outer Ear/Nose] : the outer ears and nose were normal in appearance [Normal Oropharynx] : the oropharynx was normal [No JVD] : no jugular venous distention [No Lymphadenopathy] : no lymphadenopathy [Supple] : supple [No Respiratory Distress] : no respiratory distress  [No Accessory Muscle Use] : no accessory muscle use [Clear to Auscultation] : lungs were clear to auscultation bilaterally [Normal Rate] : normal rate  [Regular Rhythm] : with a regular rhythm [Normal S1, S2] : normal S1 and S2 [No Murmur] : no murmur heard [No Carotid Bruits] : no carotid bruits [No Abdominal Bruit] : a ~M bruit was not heard ~T in the abdomen [No Varicosities] : no varicosities [Pedal Pulses Present] : the pedal pulses are present [No Edema] : there was no peripheral edema [No Palpable Aorta] : no palpable aorta [No Extremity Clubbing/Cyanosis] : no extremity clubbing/cyanosis [Soft] : abdomen soft [Non Tender] : non-tender [Non-distended] : non-distended [No HSM] : no HSM [Normal Bowel Sounds] : normal bowel sounds [No CVA Tenderness] : no CVA  tenderness [No Spinal Tenderness] : no spinal tenderness [Grossly Normal Strength/Tone] : grossly normal strength/tone [Coordination Grossly Intact] : coordination grossly intact [Deep Tendon Reflexes (DTR)] : deep tendon reflexes were 2+ and symmetric [Normal Affect] : the affect was normal [Normal Insight/Judgement] : insight and judgment were intact [de-identified] : obese [de-identified] : left thyroid prominence. [de-identified] : right sided incisional hernia. large. [de-identified] : bilat knee djd. changes [de-identified] : ble-/indurated, hyperpigmented .right sole with hyperpigment patch with scale [de-identified] : slow, ambulates with a cane

## 2024-09-17 NOTE — HISTORY OF PRESENT ILLNESS
[FreeTextEntry1] : The patient is here for a follow up visit to review their medications and chronic medical conditions.  dm,htn,hld [de-identified] : Here for follow up visit. Doing well. Appetitie, sleep,bms,voiding, mood all ok.  Interim and relevant labs, imaging and consultations reviewed.

## 2024-09-17 NOTE — ASSESSMENT
[FreeTextEntry1] : Ataxia- djd knees- s/p inj, f/u with ortho DM2- chk fs qd, metformin, chk labs  optho- 2023- no retinopathy  Feet- no sores, occasional mild neuropathy. HLD- chk labs, cont atorv HTN- stable, cont atenolol, losartan, hct- decr hct to 1 qd from 2 qd and change atenolol to qhs from qam to see if lightheaded feeling resolves Glaucoma- f/u with optho, cont drops Bilat Knee pain- f/u with ortho Chronic Venous Insuff- low Na diet, hct.saw vasc. no tx at this time Morbid obesity- discussed low calorie diet and exercise as part of a weight loss plan,ozempic Breast Cancer- onc noted. off arimidex Decr Hearing- ENT f/u cnstipation- fluids, start benefiber qd, colace 200mg qd, smooth move tead q3d, Monitor symptoms and report any changes or concerns. , GI f/u  abd hernia- saw surg, referred to weight loss program,  Anemia- t iron 65mg qd, vit c 500mg qd, referred to GI, rpt labs Thyroid nodule- bx-neg for cancer elev alk phos- saw onc, total body bone scan neg. likely due to fatty liver pod eval Pt. was encouraged to do all relevant screening tests including but not limited to mammogram, gyn visit, colonoscopy, bone density, annual skin exam. Total time spent 40 min. ( 25 min was FTF and 15 min was chart review and documentation for a total of 40 min. ).

## 2024-09-17 NOTE — PHYSICAL EXAM
[No Acute Distress] : no acute distress [Well Nourished] : well nourished [Well-Appearing] : well-appearing [Normal Sclera/Conjunctiva] : normal sclera/conjunctiva [PERRL] : pupils equal round and reactive to light [EOMI] : extraocular movements intact [Normal Outer Ear/Nose] : the outer ears and nose were normal in appearance [Normal Oropharynx] : the oropharynx was normal [No JVD] : no jugular venous distention [No Lymphadenopathy] : no lymphadenopathy [Supple] : supple [No Respiratory Distress] : no respiratory distress  [No Accessory Muscle Use] : no accessory muscle use [Clear to Auscultation] : lungs were clear to auscultation bilaterally [Normal Rate] : normal rate  [Regular Rhythm] : with a regular rhythm [Normal S1, S2] : normal S1 and S2 [No Murmur] : no murmur heard [No Carotid Bruits] : no carotid bruits [No Abdominal Bruit] : a ~M bruit was not heard ~T in the abdomen [No Varicosities] : no varicosities [Pedal Pulses Present] : the pedal pulses are present [No Edema] : there was no peripheral edema [No Palpable Aorta] : no palpable aorta [No Extremity Clubbing/Cyanosis] : no extremity clubbing/cyanosis [Soft] : abdomen soft [Non Tender] : non-tender [Non-distended] : non-distended [No HSM] : no HSM [Normal Bowel Sounds] : normal bowel sounds [No CVA Tenderness] : no CVA  tenderness [No Spinal Tenderness] : no spinal tenderness [Grossly Normal Strength/Tone] : grossly normal strength/tone [Coordination Grossly Intact] : coordination grossly intact [Deep Tendon Reflexes (DTR)] : deep tendon reflexes were 2+ and symmetric [Normal Affect] : the affect was normal [Normal Insight/Judgement] : insight and judgment were intact [de-identified] : obese [de-identified] : left thyroid prominence. [de-identified] : right sided incisional hernia. large. [de-identified] : bilat knee djd. changes [de-identified] : ble-/indurated, hyperpigmented .right sole with hyperpigment patch with scale [de-identified] : slow, ambulates with a cane

## 2024-09-18 LAB
ALBUMIN SERPL ELPH-MCNC: 4.3 G/DL
ALP BLD-CCNC: 124 U/L
ALT SERPL-CCNC: 7 U/L
ANION GAP SERPL CALC-SCNC: 16 MMOL/L
AST SERPL-CCNC: 13 U/L
BASOPHILS # BLD AUTO: 0.04 K/UL
BASOPHILS NFR BLD AUTO: 0.6 %
BILIRUB SERPL-MCNC: 0.5 MG/DL
BUN SERPL-MCNC: 12 MG/DL
CALCIUM SERPL-MCNC: 10.1 MG/DL
CHLORIDE SERPL-SCNC: 98 MMOL/L
CHOLEST SERPL-MCNC: 117 MG/DL
CO2 SERPL-SCNC: 27 MMOL/L
CREAT SERPL-MCNC: 0.56 MG/DL
EGFR: 99 ML/MIN/1.73M2
EOSINOPHIL # BLD AUTO: 0.05 K/UL
EOSINOPHIL NFR BLD AUTO: 0.7 %
ESTIMATED AVERAGE GLUCOSE: 154 MG/DL
GLUCOSE SERPL-MCNC: 106 MG/DL
HBA1C MFR BLD HPLC: 7 %
HCT VFR BLD CALC: 43.6 %
HDLC SERPL-MCNC: 40 MG/DL
HGB BLD-MCNC: 13.3 G/DL
IMM GRANULOCYTES NFR BLD AUTO: 0.1 %
LDLC SERPL CALC-MCNC: 65 MG/DL
LYMPHOCYTES # BLD AUTO: 2.3 K/UL
LYMPHOCYTES NFR BLD AUTO: 31.9 %
MAN DIFF?: NORMAL
MCHC RBC-ENTMCNC: 28.3 PG
MCHC RBC-ENTMCNC: 30.5 GM/DL
MCV RBC AUTO: 92.8 FL
MONOCYTES # BLD AUTO: 0.43 K/UL
MONOCYTES NFR BLD AUTO: 6 %
NEUTROPHILS # BLD AUTO: 4.37 K/UL
NEUTROPHILS NFR BLD AUTO: 60.7 %
NONHDLC SERPL-MCNC: 77 MG/DL
PLATELET # BLD AUTO: 237 K/UL
POTASSIUM SERPL-SCNC: 3.8 MMOL/L
PROT SERPL-MCNC: 7 G/DL
RBC # BLD: 4.7 M/UL
RBC # FLD: 13.6 %
SODIUM SERPL-SCNC: 141 MMOL/L
TRIGL SERPL-MCNC: 56 MG/DL
WBC # FLD AUTO: 7.2 K/UL

## 2024-10-10 ENCOUNTER — APPOINTMENT (OUTPATIENT)
Dept: BARIATRICS | Facility: CLINIC | Age: 69
End: 2024-10-10
Payer: MEDICARE

## 2024-10-10 VITALS
HEART RATE: 75 BPM | BODY MASS INDEX: 42.38 KG/M2 | OXYGEN SATURATION: 96 % | DIASTOLIC BLOOD PRESSURE: 73 MMHG | WEIGHT: 270 LBS | TEMPERATURE: 97.9 F | HEIGHT: 67 IN | SYSTOLIC BLOOD PRESSURE: 119 MMHG

## 2024-10-10 DIAGNOSIS — E66.01 MORBID (SEVERE) OBESITY DUE TO EXCESS CALORIES: ICD-10-CM

## 2024-10-10 DIAGNOSIS — E78.5 HYPERLIPIDEMIA, UNSPECIFIED: ICD-10-CM

## 2024-10-10 DIAGNOSIS — E11.9 TYPE 2 DIABETES MELLITUS W/OUT COMPLICATIONS: ICD-10-CM

## 2024-10-10 DIAGNOSIS — G47.33 OBSTRUCTIVE SLEEP APNEA (ADULT) (PEDIATRIC): ICD-10-CM

## 2024-10-10 DIAGNOSIS — E11.42 TYPE 2 DIABETES MELLITUS WITH DIABETIC POLYNEUROPATHY: ICD-10-CM

## 2024-10-10 DIAGNOSIS — E04.1 NONTOXIC SINGLE THYROID NODULE: ICD-10-CM

## 2024-10-10 PROCEDURE — 99213 OFFICE O/P EST LOW 20 MIN: CPT

## 2024-10-10 PROCEDURE — G0447 BEHAVIOR COUNSEL OBESITY 15M: CPT | Mod: 59

## 2024-10-23 ENCOUNTER — OUTPATIENT (OUTPATIENT)
Dept: OUTPATIENT SERVICES | Facility: HOSPITAL | Age: 69
LOS: 1 days | Discharge: HOME | End: 2024-10-23
Payer: MEDICARE

## 2024-10-23 DIAGNOSIS — K46.9 UNSPECIFIED ABDOMINAL HERNIA WITHOUT OBSTRUCTION OR GANGRENE: Chronic | ICD-10-CM

## 2024-10-23 DIAGNOSIS — Z98.891 HISTORY OF UTERINE SCAR FROM PREVIOUS SURGERY: Chronic | ICD-10-CM

## 2024-10-23 DIAGNOSIS — Z87.828 PERSONAL HISTORY OF OTHER (HEALED) PHYSICAL INJURY AND TRAUMA: Chronic | ICD-10-CM

## 2024-10-23 DIAGNOSIS — Z90.711 ACQUIRED ABSENCE OF UTERUS WITH REMAINING CERVICAL STUMP: Chronic | ICD-10-CM

## 2024-10-23 DIAGNOSIS — Z98.890 OTHER SPECIFIED POSTPROCEDURAL STATES: Chronic | ICD-10-CM

## 2024-10-23 DIAGNOSIS — C50.919 MALIGNANT NEOPLASM OF UNSPECIFIED SITE OF UNSPECIFIED FEMALE BREAST: ICD-10-CM

## 2024-10-24 NOTE — PHYSICAL EXAM
Nephrology [Nasal Endoscopy Performed] : nasal endoscopy was performed, see procedure section for findings [Midline] : trachea located in midline position [Normal] : no rashes

## 2024-10-25 ENCOUNTER — OUTPATIENT (OUTPATIENT)
Dept: OUTPATIENT SERVICES | Facility: HOSPITAL | Age: 69
LOS: 1 days | End: 2024-10-25
Payer: MEDICARE

## 2024-10-25 ENCOUNTER — APPOINTMENT (OUTPATIENT)
Dept: RADIOLOGY | Facility: IMAGING CENTER | Age: 69
End: 2024-10-25

## 2024-10-25 DIAGNOSIS — Z87.828 PERSONAL HISTORY OF OTHER (HEALED) PHYSICAL INJURY AND TRAUMA: Chronic | ICD-10-CM

## 2024-10-25 DIAGNOSIS — Z90.711 ACQUIRED ABSENCE OF UTERUS WITH REMAINING CERVICAL STUMP: Chronic | ICD-10-CM

## 2024-10-25 DIAGNOSIS — Z98.891 HISTORY OF UTERINE SCAR FROM PREVIOUS SURGERY: Chronic | ICD-10-CM

## 2024-10-25 DIAGNOSIS — K46.9 UNSPECIFIED ABDOMINAL HERNIA WITHOUT OBSTRUCTION OR GANGRENE: Chronic | ICD-10-CM

## 2024-10-25 DIAGNOSIS — Z98.890 OTHER SPECIFIED POSTPROCEDURAL STATES: Chronic | ICD-10-CM

## 2024-10-25 DIAGNOSIS — C50.911 MALIGNANT NEOPLASM OF UNSPECIFIED SITE OF RIGHT FEMALE BREAST: ICD-10-CM

## 2024-10-25 PROCEDURE — 77085 DXA BONE DENSITY AXL VRT FX: CPT | Mod: 26

## 2024-10-28 ENCOUNTER — APPOINTMENT (OUTPATIENT)
Dept: HEMATOLOGY ONCOLOGY | Facility: CLINIC | Age: 69
End: 2024-10-28
Payer: MEDICARE

## 2024-10-28 DIAGNOSIS — C50.911 MALIGNANT NEOPLASM OF UNSPECIFIED SITE OF RIGHT FEMALE BREAST: ICD-10-CM

## 2024-10-28 PROCEDURE — 99214 OFFICE O/P EST MOD 30 MIN: CPT

## 2024-10-28 PROCEDURE — G2211 COMPLEX E/M VISIT ADD ON: CPT

## 2024-11-13 ENCOUNTER — APPOINTMENT (OUTPATIENT)
Dept: BARIATRICS | Facility: CLINIC | Age: 69
End: 2024-11-13

## 2024-11-13 NOTE — PHYSICAL EXAM
Medication: Adderall XR 20mg and Adderall 10mg  Last office visit date: 10-15-24 VV  Medication Refill Protocol Failed.  Failed criteria: see below. Sent to clinician to review.     Controlled Substances Refill Protocol - 12 Month Protocol - ALWAYS forward to ordering provider [id number] 08:12 AM   Protocol Details FORWARD TO PROVIDER - Refill requests for these medications must ALWAYS be forwarded to the ordering provider, even if all criteria are met    PDMP has been reviewed in last 24 hours    Urine/serum drug screen on file in the appropriate time frame    Active/up-to-date controlled substances agreement/consent on file        Upcoming appointment scheduled on: Visit date not found   Per last office visit, patient is to follow up 6 months.   Last refill on: 10-15-24  #56/0       [No Acute Distress] : no acute distress [Well Nourished] : well nourished [Well Developed] : well developed [Well-Appearing] : well-appearing [Normal Sclera/Conjunctiva] : normal sclera/conjunctiva [PERRL] : pupils equal round and reactive to light [EOMI] : extraocular movements intact [Normal Outer Ear/Nose] : the outer ears and nose were normal in appearance [Normal Oropharynx] : the oropharynx was normal [No JVD] : no jugular venous distention [No Lymphadenopathy] : no lymphadenopathy [Supple] : supple [Thyroid Normal, No Nodules] : the thyroid was normal and there were no nodules present [No Respiratory Distress] : no respiratory distress  [No Accessory Muscle Use] : no accessory muscle use [Clear to Auscultation] : lungs were clear to auscultation bilaterally [Normal Rate] : normal rate  [Regular Rhythm] : with a regular rhythm [Normal S1, S2] : normal S1 and S2 [No Murmur] : no murmur heard [No Carotid Bruits] : no carotid bruits [No Abdominal Bruit] : a ~M bruit was not heard ~T in the abdomen [No Varicosities] : no varicosities [Pedal Pulses Present] : the pedal pulses are present [No Edema] : there was no peripheral edema [No Palpable Aorta] : no palpable aorta [No Extremity Clubbing/Cyanosis] : no extremity clubbing/cyanosis [Soft] : abdomen soft [Non Tender] : non-tender [Non-distended] : non-distended [No HSM] : no HSM [Normal Bowel Sounds] : normal bowel sounds [Normal Posterior Cervical Nodes] : no posterior cervical lymphadenopathy [Normal Anterior Cervical Nodes] : no anterior cervical lymphadenopathy [No CVA Tenderness] : no CVA  tenderness [No Spinal Tenderness] : no spinal tenderness [No Joint Swelling] : no joint swelling [Grossly Normal Strength/Tone] : grossly normal strength/tone [Coordination Grossly Intact] : coordination grossly intact [No Focal Deficits] : no focal deficits [Normal Gait] : normal gait [Deep Tendon Reflexes (DTR)] : deep tendon reflexes were 2+ and symmetric [Normal Affect] : the affect was normal [Normal Insight/Judgement] : insight and judgment were intact [de-identified] : done this yr by surgeon [de-identified] : right sided incisional hernia. large. [de-identified] : ble- edematous/indurated, hyperpigmented .

## 2024-11-15 ENCOUNTER — APPOINTMENT (OUTPATIENT)
Dept: BARIATRICS | Facility: CLINIC | Age: 69
End: 2024-11-15

## 2024-11-20 PROCEDURE — 77085 DXA BONE DENSITY AXL VRT FX: CPT

## 2024-12-04 ENCOUNTER — APPOINTMENT (OUTPATIENT)
Dept: BARIATRICS | Facility: CLINIC | Age: 69
End: 2024-12-04

## 2024-12-06 ENCOUNTER — OUTPATIENT (OUTPATIENT)
Dept: OUTPATIENT SERVICES | Facility: HOSPITAL | Age: 69
LOS: 1 days | End: 2024-12-06

## 2024-12-06 ENCOUNTER — APPOINTMENT (OUTPATIENT)
Dept: MAMMOGRAPHY | Facility: IMAGING CENTER | Age: 69
End: 2024-12-06

## 2024-12-06 DIAGNOSIS — Z98.890 OTHER SPECIFIED POSTPROCEDURAL STATES: Chronic | ICD-10-CM

## 2024-12-06 DIAGNOSIS — Z98.891 HISTORY OF UTERINE SCAR FROM PREVIOUS SURGERY: Chronic | ICD-10-CM

## 2024-12-06 DIAGNOSIS — Z87.828 PERSONAL HISTORY OF OTHER (HEALED) PHYSICAL INJURY AND TRAUMA: Chronic | ICD-10-CM

## 2024-12-06 DIAGNOSIS — Z90.711 ACQUIRED ABSENCE OF UTERUS WITH REMAINING CERVICAL STUMP: Chronic | ICD-10-CM

## 2024-12-06 DIAGNOSIS — K46.9 UNSPECIFIED ABDOMINAL HERNIA WITHOUT OBSTRUCTION OR GANGRENE: Chronic | ICD-10-CM

## 2024-12-06 DIAGNOSIS — C50.911 MALIGNANT NEOPLASM OF UNSPECIFIED SITE OF RIGHT FEMALE BREAST: ICD-10-CM

## 2024-12-10 ENCOUNTER — APPOINTMENT (OUTPATIENT)
Dept: ORTHOPEDIC SURGERY | Facility: CLINIC | Age: 69
End: 2024-12-10
Payer: MEDICARE

## 2024-12-10 DIAGNOSIS — M17.0 BILATERAL PRIMARY OSTEOARTHRITIS OF KNEE: ICD-10-CM

## 2024-12-10 PROCEDURE — 99213 OFFICE O/P EST LOW 20 MIN: CPT | Mod: 25

## 2024-12-10 PROCEDURE — 20611 DRAIN/INJ JOINT/BURSA W/US: CPT | Mod: 50

## 2024-12-16 ENCOUNTER — APPOINTMENT (OUTPATIENT)
Dept: INTERNAL MEDICINE | Facility: CLINIC | Age: 69
End: 2024-12-16
Payer: MEDICARE

## 2024-12-16 VITALS
HEART RATE: 87 BPM | TEMPERATURE: 98.2 F | BODY MASS INDEX: 41.75 KG/M2 | RESPIRATION RATE: 18 BRPM | SYSTOLIC BLOOD PRESSURE: 118 MMHG | WEIGHT: 266 LBS | HEIGHT: 67 IN | DIASTOLIC BLOOD PRESSURE: 68 MMHG | OXYGEN SATURATION: 97 %

## 2024-12-16 DIAGNOSIS — H40.9 UNSPECIFIED GLAUCOMA: ICD-10-CM

## 2024-12-16 DIAGNOSIS — I10 ESSENTIAL (PRIMARY) HYPERTENSION: ICD-10-CM

## 2024-12-16 DIAGNOSIS — E11.42 TYPE 2 DIABETES MELLITUS WITH DIABETIC POLYNEUROPATHY: ICD-10-CM

## 2024-12-16 DIAGNOSIS — E66.01 MORBID (SEVERE) OBESITY DUE TO EXCESS CALORIES: ICD-10-CM

## 2024-12-16 DIAGNOSIS — G47.33 OBSTRUCTIVE SLEEP APNEA (ADULT) (PEDIATRIC): ICD-10-CM

## 2024-12-16 DIAGNOSIS — K59.09 OTHER CONSTIPATION: ICD-10-CM

## 2024-12-16 DIAGNOSIS — E78.5 HYPERLIPIDEMIA, UNSPECIFIED: ICD-10-CM

## 2024-12-16 DIAGNOSIS — E11.9 TYPE 2 DIABETES MELLITUS W/OUT COMPLICATIONS: ICD-10-CM

## 2024-12-16 DIAGNOSIS — M48.062 SPINAL STENOSIS, LUMBAR REGION WITH NEUROGENIC CLAUDICATION: ICD-10-CM

## 2024-12-16 PROCEDURE — 36415 COLL VENOUS BLD VENIPUNCTURE: CPT

## 2024-12-16 PROCEDURE — G2211 COMPLEX E/M VISIT ADD ON: CPT

## 2024-12-16 PROCEDURE — 99214 OFFICE O/P EST MOD 30 MIN: CPT

## 2024-12-20 LAB
ALBUMIN SERPL ELPH-MCNC: 4.4 G/DL
ALP BLD-CCNC: 120 U/L
ALT SERPL-CCNC: 11 U/L
ANION GAP SERPL CALC-SCNC: 15 MMOL/L
AST SERPL-CCNC: 14 U/L
BASOPHILS # BLD AUTO: 0.04 K/UL
BASOPHILS NFR BLD AUTO: 0.5 %
BILIRUB SERPL-MCNC: 0.4 MG/DL
BUN SERPL-MCNC: 19 MG/DL
CALCIUM SERPL-MCNC: 10.3 MG/DL
CHLORIDE SERPL-SCNC: 98 MMOL/L
CHOLEST SERPL-MCNC: 141 MG/DL
CO2 SERPL-SCNC: 28 MMOL/L
CREAT SERPL-MCNC: 0.6 MG/DL
EGFR: 97 ML/MIN/1.73M2
EOSINOPHIL # BLD AUTO: 0.1 K/UL
EOSINOPHIL NFR BLD AUTO: 1.2 %
ESTIMATED AVERAGE GLUCOSE: 128 MG/DL
GLUCOSE SERPL-MCNC: 111 MG/DL
HBA1C MFR BLD HPLC: 6.1 %
HCT VFR BLD CALC: 40.1 %
HDLC SERPL-MCNC: 41 MG/DL
HGB BLD-MCNC: 12.8 G/DL
IMM GRANULOCYTES NFR BLD AUTO: 0.3 %
LDLC SERPL CALC-MCNC: 87 MG/DL
LYMPHOCYTES # BLD AUTO: 2.8 K/UL
LYMPHOCYTES NFR BLD AUTO: 32.5 %
MAN DIFF?: NORMAL
MCHC RBC-ENTMCNC: 28.4 PG
MCHC RBC-ENTMCNC: 31.9 G/DL
MCV RBC AUTO: 89.1 FL
MONOCYTES # BLD AUTO: 0.56 K/UL
MONOCYTES NFR BLD AUTO: 6.5 %
NEUTROPHILS # BLD AUTO: 5.08 K/UL
NEUTROPHILS NFR BLD AUTO: 59 %
NONHDLC SERPL-MCNC: 101 MG/DL
PLATELET # BLD AUTO: 292 K/UL
POTASSIUM SERPL-SCNC: 3.7 MMOL/L
PROT SERPL-MCNC: 7 G/DL
RBC # BLD: 4.5 M/UL
RBC # FLD: 14.2 %
SODIUM SERPL-SCNC: 141 MMOL/L
TRIGL SERPL-MCNC: 66 MG/DL
WBC # FLD AUTO: 8.61 K/UL

## 2025-01-14 ENCOUNTER — RESULT REVIEW (OUTPATIENT)
Age: 70
End: 2025-01-14

## 2025-01-14 ENCOUNTER — OUTPATIENT (OUTPATIENT)
Dept: OUTPATIENT SERVICES | Facility: HOSPITAL | Age: 70
LOS: 1 days | End: 2025-01-14
Payer: MEDICARE

## 2025-01-14 ENCOUNTER — APPOINTMENT (OUTPATIENT)
Dept: MAMMOGRAPHY | Facility: IMAGING CENTER | Age: 70
End: 2025-01-14
Payer: MEDICARE

## 2025-01-14 DIAGNOSIS — Z87.828 PERSONAL HISTORY OF OTHER (HEALED) PHYSICAL INJURY AND TRAUMA: Chronic | ICD-10-CM

## 2025-01-14 DIAGNOSIS — C50.919 MALIGNANT NEOPLASM OF UNSPECIFIED SITE OF UNSPECIFIED FEMALE BREAST: ICD-10-CM

## 2025-01-14 DIAGNOSIS — Z90.711 ACQUIRED ABSENCE OF UTERUS WITH REMAINING CERVICAL STUMP: Chronic | ICD-10-CM

## 2025-01-14 DIAGNOSIS — K46.9 UNSPECIFIED ABDOMINAL HERNIA WITHOUT OBSTRUCTION OR GANGRENE: Chronic | ICD-10-CM

## 2025-01-14 DIAGNOSIS — Z98.890 OTHER SPECIFIED POSTPROCEDURAL STATES: Chronic | ICD-10-CM

## 2025-01-14 DIAGNOSIS — Z98.891 HISTORY OF UTERINE SCAR FROM PREVIOUS SURGERY: Chronic | ICD-10-CM

## 2025-01-14 PROCEDURE — G0279: CPT

## 2025-01-14 PROCEDURE — 77065 DX MAMMO INCL CAD UNI: CPT

## 2025-01-14 PROCEDURE — G0279: CPT | Mod: 26

## 2025-01-14 PROCEDURE — 77065 DX MAMMO INCL CAD UNI: CPT | Mod: 26,RT

## 2025-02-12 ENCOUNTER — RX RENEWAL (OUTPATIENT)
Age: 70
End: 2025-02-12

## 2025-02-19 ENCOUNTER — NON-APPOINTMENT (OUTPATIENT)
Age: 70
End: 2025-02-19

## 2025-02-19 ENCOUNTER — APPOINTMENT (OUTPATIENT)
Dept: BARIATRICS | Facility: CLINIC | Age: 70
End: 2025-02-19
Payer: MEDICARE

## 2025-02-19 VITALS
BODY MASS INDEX: 41.91 KG/M2 | DIASTOLIC BLOOD PRESSURE: 71 MMHG | OXYGEN SATURATION: 97 % | WEIGHT: 267 LBS | HEART RATE: 70 BPM | TEMPERATURE: 98.1 F | SYSTOLIC BLOOD PRESSURE: 114 MMHG | HEIGHT: 67 IN

## 2025-02-19 DIAGNOSIS — E11.9 TYPE 2 DIABETES MELLITUS W/OUT COMPLICATIONS: ICD-10-CM

## 2025-02-19 DIAGNOSIS — E04.1 NONTOXIC SINGLE THYROID NODULE: ICD-10-CM

## 2025-02-19 DIAGNOSIS — E78.5 HYPERLIPIDEMIA, UNSPECIFIED: ICD-10-CM

## 2025-02-19 DIAGNOSIS — E11.42 TYPE 2 DIABETES MELLITUS WITH DIABETIC POLYNEUROPATHY: ICD-10-CM

## 2025-02-19 DIAGNOSIS — E66.01 MORBID (SEVERE) OBESITY DUE TO EXCESS CALORIES: ICD-10-CM

## 2025-02-19 DIAGNOSIS — G47.33 OBSTRUCTIVE SLEEP APNEA (ADULT) (PEDIATRIC): ICD-10-CM

## 2025-02-19 PROCEDURE — 99214 OFFICE O/P EST MOD 30 MIN: CPT

## 2025-02-19 PROCEDURE — G0447 BEHAVIOR COUNSEL OBESITY 15M: CPT | Mod: 59

## 2025-03-13 ENCOUNTER — APPOINTMENT (OUTPATIENT)
Dept: SURGERY | Facility: CLINIC | Age: 70
End: 2025-03-13

## 2025-03-13 VITALS
BODY MASS INDEX: 42.69 KG/M2 | DIASTOLIC BLOOD PRESSURE: 75 MMHG | SYSTOLIC BLOOD PRESSURE: 123 MMHG | HEART RATE: 69 BPM | TEMPERATURE: 98.4 F | OXYGEN SATURATION: 98 % | HEIGHT: 67 IN | WEIGHT: 272 LBS

## 2025-03-13 PROCEDURE — 99213 OFFICE O/P EST LOW 20 MIN: CPT

## 2025-03-18 ENCOUNTER — APPOINTMENT (OUTPATIENT)
Dept: INTERNAL MEDICINE | Facility: CLINIC | Age: 70
End: 2025-03-18
Payer: MEDICARE

## 2025-03-18 VITALS
OXYGEN SATURATION: 97 % | TEMPERATURE: 97.8 F | HEIGHT: 67 IN | SYSTOLIC BLOOD PRESSURE: 132 MMHG | RESPIRATION RATE: 18 BRPM | BODY MASS INDEX: 43 KG/M2 | HEART RATE: 66 BPM | WEIGHT: 274 LBS | DIASTOLIC BLOOD PRESSURE: 80 MMHG

## 2025-03-18 DIAGNOSIS — E11.9 TYPE 2 DIABETES MELLITUS W/OUT COMPLICATIONS: ICD-10-CM

## 2025-03-18 DIAGNOSIS — M48.062 SPINAL STENOSIS, LUMBAR REGION WITH NEUROGENIC CLAUDICATION: ICD-10-CM

## 2025-03-18 DIAGNOSIS — I87.2 VENOUS INSUFFICIENCY (CHRONIC) (PERIPHERAL): ICD-10-CM

## 2025-03-18 DIAGNOSIS — E66.01 MORBID (SEVERE) OBESITY DUE TO EXCESS CALORIES: ICD-10-CM

## 2025-03-18 DIAGNOSIS — I10 ESSENTIAL (PRIMARY) HYPERTENSION: ICD-10-CM

## 2025-03-18 DIAGNOSIS — Z00.00 ENCOUNTER FOR GENERAL ADULT MEDICAL EXAMINATION W/OUT ABNORMAL FINDINGS: ICD-10-CM

## 2025-03-18 DIAGNOSIS — M79.3 PANNICULITIS, UNSPECIFIED: ICD-10-CM

## 2025-03-18 DIAGNOSIS — G47.33 OBSTRUCTIVE SLEEP APNEA (ADULT) (PEDIATRIC): ICD-10-CM

## 2025-03-18 DIAGNOSIS — E04.1 NONTOXIC SINGLE THYROID NODULE: ICD-10-CM

## 2025-03-18 DIAGNOSIS — E78.5 HYPERLIPIDEMIA, UNSPECIFIED: ICD-10-CM

## 2025-03-18 PROCEDURE — 99214 OFFICE O/P EST MOD 30 MIN: CPT | Mod: 25

## 2025-03-18 PROCEDURE — G0439: CPT

## 2025-03-18 PROCEDURE — 36415 COLL VENOUS BLD VENIPUNCTURE: CPT

## 2025-03-20 LAB
ALBUMIN SERPL ELPH-MCNC: 3.8 G/DL
ALP BLD-CCNC: 134 U/L
ALT SERPL-CCNC: 12 U/L
ANION GAP SERPL CALC-SCNC: 11 MMOL/L
AST SERPL-CCNC: 18 U/L
BASOPHILS # BLD AUTO: 0.03 K/UL
BASOPHILS NFR BLD AUTO: 0.5 %
BILIRUB SERPL-MCNC: 0.4 MG/DL
BUN SERPL-MCNC: 10 MG/DL
CALCIUM SERPL-MCNC: 9.5 MG/DL
CHLORIDE SERPL-SCNC: 107 MMOL/L
CHOLEST SERPL-MCNC: 129 MG/DL
CO2 SERPL-SCNC: 29 MMOL/L
CREAT SERPL-MCNC: 0.6 MG/DL
EGFRCR SERPLBLD CKD-EPI 2021: 96 ML/MIN/1.73M2
EOSINOPHIL # BLD AUTO: 0.1 K/UL
EOSINOPHIL NFR BLD AUTO: 1.7 %
ESTIMATED AVERAGE GLUCOSE: 131 MG/DL
GLUCOSE SERPL-MCNC: 104 MG/DL
HBA1C MFR BLD HPLC: 6.2 %
HCT VFR BLD CALC: 38.3 %
HDLC SERPL-MCNC: 39 MG/DL
HGB BLD-MCNC: 12.2 G/DL
IMM GRANULOCYTES NFR BLD AUTO: 0.3 %
LDLC SERPL-MCNC: 77 MG/DL
LYMPHOCYTES # BLD AUTO: 2.28 K/UL
LYMPHOCYTES NFR BLD AUTO: 39.2 %
MAN DIFF?: NORMAL
MCHC RBC-ENTMCNC: 28.2 PG
MCHC RBC-ENTMCNC: 31.9 G/DL
MCV RBC AUTO: 88.7 FL
MONOCYTES # BLD AUTO: 0.35 K/UL
MONOCYTES NFR BLD AUTO: 6 %
NEUTROPHILS # BLD AUTO: 3.03 K/UL
NEUTROPHILS NFR BLD AUTO: 52.3 %
NONHDLC SERPL-MCNC: 90 MG/DL
PLATELET # BLD AUTO: 212 K/UL
POTASSIUM SERPL-SCNC: 4.1 MMOL/L
PROT SERPL-MCNC: 6.3 G/DL
RBC # BLD: 4.32 M/UL
RBC # FLD: 14 %
SODIUM SERPL-SCNC: 148 MMOL/L
T3 SERPL-MCNC: 76 NG/DL
T4 FREE SERPL-MCNC: 1.2 NG/DL
TRIGL SERPL-MCNC: 66 MG/DL
TSH SERPL-ACNC: 1.55 UIU/ML
WBC # FLD AUTO: 5.81 K/UL

## 2025-03-21 ENCOUNTER — APPOINTMENT (OUTPATIENT)
Dept: ORTHOPEDIC SURGERY | Facility: CLINIC | Age: 70
End: 2025-03-21
Payer: MEDICARE

## 2025-03-21 DIAGNOSIS — M17.0 BILATERAL PRIMARY OSTEOARTHRITIS OF KNEE: ICD-10-CM

## 2025-03-21 PROCEDURE — 20610 DRAIN/INJ JOINT/BURSA W/O US: CPT | Mod: 50

## 2025-03-21 PROCEDURE — 99213 OFFICE O/P EST LOW 20 MIN: CPT | Mod: 25

## 2025-03-26 ENCOUNTER — APPOINTMENT (OUTPATIENT)
Dept: BARIATRICS | Facility: CLINIC | Age: 70
End: 2025-03-26

## 2025-03-31 ENCOUNTER — RX RENEWAL (OUTPATIENT)
Age: 70
End: 2025-03-31

## 2025-04-08 ENCOUNTER — OUTPATIENT (OUTPATIENT)
Dept: OUTPATIENT SERVICES | Facility: HOSPITAL | Age: 70
LOS: 1 days | Discharge: HOME | End: 2025-04-08

## 2025-04-08 DIAGNOSIS — Z87.828 PERSONAL HISTORY OF OTHER (HEALED) PHYSICAL INJURY AND TRAUMA: Chronic | ICD-10-CM

## 2025-04-08 DIAGNOSIS — Z90.711 ACQUIRED ABSENCE OF UTERUS WITH REMAINING CERVICAL STUMP: Chronic | ICD-10-CM

## 2025-04-08 DIAGNOSIS — K46.9 UNSPECIFIED ABDOMINAL HERNIA WITHOUT OBSTRUCTION OR GANGRENE: Chronic | ICD-10-CM

## 2025-04-08 DIAGNOSIS — Z98.890 OTHER SPECIFIED POSTPROCEDURAL STATES: Chronic | ICD-10-CM

## 2025-04-08 DIAGNOSIS — Z98.891 HISTORY OF UTERINE SCAR FROM PREVIOUS SURGERY: Chronic | ICD-10-CM

## 2025-04-08 DIAGNOSIS — C50.919 MALIGNANT NEOPLASM OF UNSPECIFIED SITE OF UNSPECIFIED FEMALE BREAST: ICD-10-CM

## 2025-04-11 ENCOUNTER — APPOINTMENT (OUTPATIENT)
Dept: HEMATOLOGY ONCOLOGY | Facility: CLINIC | Age: 70
End: 2025-04-11
Payer: MEDICARE

## 2025-04-11 VITALS
HEART RATE: 67 BPM | OXYGEN SATURATION: 97 % | WEIGHT: 267.99 LBS | BODY MASS INDEX: 41.98 KG/M2 | TEMPERATURE: 97 F | DIASTOLIC BLOOD PRESSURE: 72 MMHG | RESPIRATION RATE: 16 BRPM | SYSTOLIC BLOOD PRESSURE: 118 MMHG

## 2025-04-11 DIAGNOSIS — C50.919 MALIGNANT NEOPLASM OF UNSPECIFIED SITE OF UNSPECIFIED FEMALE BREAST: ICD-10-CM

## 2025-04-11 PROCEDURE — G2211 COMPLEX E/M VISIT ADD ON: CPT

## 2025-04-11 PROCEDURE — 99214 OFFICE O/P EST MOD 30 MIN: CPT

## 2025-04-11 RX ORDER — SEMAGLUTIDE 1.34 MG/ML
4 INJECTION, SOLUTION SUBCUTANEOUS
Qty: 3 | Refills: 3 | Status: ACTIVE | COMMUNITY
Start: 2025-04-11 | End: 1900-01-01

## 2025-04-12 ENCOUNTER — APPOINTMENT (OUTPATIENT)
Dept: ULTRASOUND IMAGING | Facility: IMAGING CENTER | Age: 70
End: 2025-04-12

## 2025-04-12 ENCOUNTER — OUTPATIENT (OUTPATIENT)
Dept: OUTPATIENT SERVICES | Facility: HOSPITAL | Age: 70
LOS: 1 days | End: 2025-04-12

## 2025-04-12 DIAGNOSIS — Z98.891 HISTORY OF UTERINE SCAR FROM PREVIOUS SURGERY: Chronic | ICD-10-CM

## 2025-04-12 DIAGNOSIS — Z98.890 OTHER SPECIFIED POSTPROCEDURAL STATES: Chronic | ICD-10-CM

## 2025-04-12 DIAGNOSIS — Z90.711 ACQUIRED ABSENCE OF UTERUS WITH REMAINING CERVICAL STUMP: Chronic | ICD-10-CM

## 2025-04-12 DIAGNOSIS — Z87.828 PERSONAL HISTORY OF OTHER (HEALED) PHYSICAL INJURY AND TRAUMA: Chronic | ICD-10-CM

## 2025-04-12 DIAGNOSIS — K46.9 UNSPECIFIED ABDOMINAL HERNIA WITHOUT OBSTRUCTION OR GANGRENE: Chronic | ICD-10-CM

## 2025-04-12 DIAGNOSIS — E04.1 NONTOXIC SINGLE THYROID NODULE: ICD-10-CM

## 2025-06-11 ENCOUNTER — APPOINTMENT (OUTPATIENT)
Dept: BARIATRICS | Facility: CLINIC | Age: 70
End: 2025-06-11
Payer: MEDICARE

## 2025-06-11 VITALS
SYSTOLIC BLOOD PRESSURE: 154 MMHG | HEIGHT: 67 IN | WEIGHT: 278 LBS | DIASTOLIC BLOOD PRESSURE: 81 MMHG | BODY MASS INDEX: 43.63 KG/M2 | HEART RATE: 56 BPM | OXYGEN SATURATION: 96 %

## 2025-06-11 PROCEDURE — 99214 OFFICE O/P EST MOD 30 MIN: CPT

## 2025-06-11 PROCEDURE — G0447 BEHAVIOR COUNSEL OBESITY 15M: CPT | Mod: 59

## 2025-06-20 ENCOUNTER — APPOINTMENT (OUTPATIENT)
Dept: ORTHOPEDIC SURGERY | Facility: CLINIC | Age: 70
End: 2025-06-20
Payer: MEDICARE

## 2025-06-20 PROCEDURE — 99213 OFFICE O/P EST LOW 20 MIN: CPT | Mod: 25

## 2025-06-20 PROCEDURE — 20610 DRAIN/INJ JOINT/BURSA W/O US: CPT | Mod: 50

## 2025-06-30 ENCOUNTER — APPOINTMENT (OUTPATIENT)
Dept: INTERNAL MEDICINE | Facility: CLINIC | Age: 70
End: 2025-06-30
Payer: MEDICARE

## 2025-06-30 VITALS
WEIGHT: 2 LBS | TEMPERATURE: 97.1 F | HEIGHT: 67 IN | OXYGEN SATURATION: 96 % | SYSTOLIC BLOOD PRESSURE: 130 MMHG | BODY MASS INDEX: 0.31 KG/M2 | RESPIRATION RATE: 16 BRPM | DIASTOLIC BLOOD PRESSURE: 82 MMHG | HEART RATE: 59 BPM

## 2025-06-30 PROCEDURE — 99214 OFFICE O/P EST MOD 30 MIN: CPT

## 2025-06-30 PROCEDURE — G2211 COMPLEX E/M VISIT ADD ON: CPT

## 2025-06-30 PROCEDURE — 36415 COLL VENOUS BLD VENIPUNCTURE: CPT

## 2025-07-06 LAB
ALBUMIN SERPL ELPH-MCNC: 4.1 G/DL
ALP BLD-CCNC: 118 U/L
ALT SERPL-CCNC: 15 U/L
ANION GAP SERPL CALC-SCNC: 15 MMOL/L
AST SERPL-CCNC: 15 U/L
BASOPHILS # BLD AUTO: 0.03 K/UL
BASOPHILS NFR BLD AUTO: 0.4 %
BILIRUB SERPL-MCNC: 0.5 MG/DL
BUN SERPL-MCNC: 18 MG/DL
CALCIUM SERPL-MCNC: 9.6 MG/DL
CHLORIDE SERPL-SCNC: 103 MMOL/L
CHOLEST SERPL-MCNC: 126 MG/DL
CO2 SERPL-SCNC: 26 MMOL/L
CREAT SERPL-MCNC: 0.64 MG/DL
CREAT SPEC-SCNC: 84 MG/DL
EGFRCR SERPLBLD CKD-EPI 2021: 95 ML/MIN/1.73M2
EOSINOPHIL # BLD AUTO: 0.04 K/UL
EOSINOPHIL NFR BLD AUTO: 0.5 %
ESTIMATED AVERAGE GLUCOSE: 146 MG/DL
GLUCOSE SERPL-MCNC: 102 MG/DL
HBA1C MFR BLD HPLC: 6.7 %
HCT VFR BLD CALC: 43.9 %
HDLC SERPL-MCNC: 40 MG/DL
HGB BLD-MCNC: 13.6 G/DL
IMM GRANULOCYTES NFR BLD AUTO: 0.3 %
LDLC SERPL-MCNC: 72 MG/DL
LYMPHOCYTES # BLD AUTO: 2.91 K/UL
LYMPHOCYTES NFR BLD AUTO: 38.8 %
MAN DIFF?: NORMAL
MCHC RBC-ENTMCNC: 28.6 PG
MCHC RBC-ENTMCNC: 31 G/DL
MCV RBC AUTO: 92.2 FL
MICROALBUMIN 24H UR DL<=1MG/L-MCNC: <1.2 MG/DL
MICROALBUMIN/CREAT 24H UR-RTO: NORMAL MG/G
MONOCYTES # BLD AUTO: 0.48 K/UL
MONOCYTES NFR BLD AUTO: 6.4 %
NEUTROPHILS # BLD AUTO: 4.02 K/UL
NEUTROPHILS NFR BLD AUTO: 53.6 %
NONHDLC SERPL-MCNC: 86 MG/DL
PLATELET # BLD AUTO: 217 K/UL
POTASSIUM SERPL-SCNC: 4.5 MMOL/L
PROT SERPL-MCNC: 6.8 G/DL
RBC # BLD: 4.76 M/UL
RBC # FLD: 14.6 %
SODIUM SERPL-SCNC: 144 MMOL/L
TRIGL SERPL-MCNC: 66 MG/DL
WBC # FLD AUTO: 7.5 K/UL

## 2025-07-07 ENCOUNTER — NON-APPOINTMENT (OUTPATIENT)
Age: 70
End: 2025-07-07

## 2025-07-28 ENCOUNTER — OUTPATIENT (OUTPATIENT)
Dept: OUTPATIENT SERVICES | Facility: HOSPITAL | Age: 70
LOS: 1 days | End: 2025-07-28
Payer: MEDICARE

## 2025-07-28 ENCOUNTER — RESULT REVIEW (OUTPATIENT)
Age: 70
End: 2025-07-28

## 2025-07-28 ENCOUNTER — APPOINTMENT (OUTPATIENT)
Dept: ULTRASOUND IMAGING | Facility: IMAGING CENTER | Age: 70
End: 2025-07-28
Payer: MEDICARE

## 2025-07-28 ENCOUNTER — APPOINTMENT (OUTPATIENT)
Dept: MAMMOGRAPHY | Facility: IMAGING CENTER | Age: 70
End: 2025-07-28
Payer: MEDICARE

## 2025-07-28 DIAGNOSIS — E04.1 NONTOXIC SINGLE THYROID NODULE: ICD-10-CM

## 2025-07-28 DIAGNOSIS — C50.919 MALIGNANT NEOPLASM OF UNSPECIFIED SITE OF UNSPECIFIED FEMALE BREAST: ICD-10-CM

## 2025-07-28 DIAGNOSIS — Z98.891 HISTORY OF UTERINE SCAR FROM PREVIOUS SURGERY: Chronic | ICD-10-CM

## 2025-07-28 DIAGNOSIS — Z87.828 PERSONAL HISTORY OF OTHER (HEALED) PHYSICAL INJURY AND TRAUMA: Chronic | ICD-10-CM

## 2025-07-28 DIAGNOSIS — Z90.711 ACQUIRED ABSENCE OF UTERUS WITH REMAINING CERVICAL STUMP: Chronic | ICD-10-CM

## 2025-07-28 DIAGNOSIS — Z98.890 OTHER SPECIFIED POSTPROCEDURAL STATES: Chronic | ICD-10-CM

## 2025-07-28 DIAGNOSIS — K46.9 UNSPECIFIED ABDOMINAL HERNIA WITHOUT OBSTRUCTION OR GANGRENE: Chronic | ICD-10-CM

## 2025-07-28 PROCEDURE — 76536 US EXAM OF HEAD AND NECK: CPT | Mod: 26

## 2025-07-28 PROCEDURE — 77066 DX MAMMO INCL CAD BI: CPT

## 2025-07-28 PROCEDURE — 76641 ULTRASOUND BREAST COMPLETE: CPT | Mod: 26,50,GA

## 2025-07-28 PROCEDURE — 77066 DX MAMMO INCL CAD BI: CPT | Mod: 26

## 2025-07-28 PROCEDURE — 76641 ULTRASOUND BREAST COMPLETE: CPT

## 2025-07-28 PROCEDURE — G0279: CPT | Mod: 26

## 2025-07-28 PROCEDURE — 76536 US EXAM OF HEAD AND NECK: CPT

## 2025-07-28 PROCEDURE — G0279: CPT

## 2025-08-06 ENCOUNTER — OUTPATIENT (OUTPATIENT)
Dept: OUTPATIENT SERVICES | Facility: HOSPITAL | Age: 70
LOS: 1 days | End: 2025-08-06
Payer: MEDICARE

## 2025-08-06 ENCOUNTER — APPOINTMENT (OUTPATIENT)
Dept: MAMMOGRAPHY | Facility: IMAGING CENTER | Age: 70
End: 2025-08-06
Payer: MEDICARE

## 2025-08-06 ENCOUNTER — RESULT REVIEW (OUTPATIENT)
Age: 70
End: 2025-08-06

## 2025-08-06 DIAGNOSIS — Z98.890 OTHER SPECIFIED POSTPROCEDURAL STATES: Chronic | ICD-10-CM

## 2025-08-06 DIAGNOSIS — Z87.828 PERSONAL HISTORY OF OTHER (HEALED) PHYSICAL INJURY AND TRAUMA: Chronic | ICD-10-CM

## 2025-08-06 DIAGNOSIS — Z98.891 HISTORY OF UTERINE SCAR FROM PREVIOUS SURGERY: Chronic | ICD-10-CM

## 2025-08-06 DIAGNOSIS — K46.9 UNSPECIFIED ABDOMINAL HERNIA WITHOUT OBSTRUCTION OR GANGRENE: Chronic | ICD-10-CM

## 2025-08-06 DIAGNOSIS — Z90.711 ACQUIRED ABSENCE OF UTERUS WITH REMAINING CERVICAL STUMP: Chronic | ICD-10-CM

## 2025-08-06 DIAGNOSIS — C50.911 MALIGNANT NEOPLASM OF UNSPECIFIED SITE OF RIGHT FEMALE BREAST: ICD-10-CM

## 2025-08-06 PROCEDURE — 88305 TISSUE EXAM BY PATHOLOGIST: CPT | Mod: 26

## 2025-08-06 PROCEDURE — 19081 BX BREAST 1ST LESION STRTCTC: CPT | Mod: RT

## 2025-08-06 PROCEDURE — 76098 X-RAY EXAM SURGICAL SPECIMEN: CPT | Mod: 26

## 2025-08-06 PROCEDURE — A4648: CPT

## 2025-08-06 PROCEDURE — 77065 DX MAMMO INCL CAD UNI: CPT | Mod: 26,RT

## 2025-08-06 PROCEDURE — 88305 TISSUE EXAM BY PATHOLOGIST: CPT

## 2025-08-06 PROCEDURE — 19081 BX BREAST 1ST LESION STRTCTC: CPT

## 2025-08-06 PROCEDURE — 77065 DX MAMMO INCL CAD UNI: CPT

## 2025-08-08 LAB — SURGICAL PATHOLOGY STUDY: SIGNIFICANT CHANGE UP

## 2025-09-03 ENCOUNTER — APPOINTMENT (OUTPATIENT)
Dept: BARIATRICS | Facility: CLINIC | Age: 70
End: 2025-09-03
Payer: MEDICARE

## 2025-09-03 VITALS
WEIGHT: 288 LBS | DIASTOLIC BLOOD PRESSURE: 82 MMHG | HEART RATE: 84 BPM | OXYGEN SATURATION: 96 % | BODY MASS INDEX: 45.2 KG/M2 | SYSTOLIC BLOOD PRESSURE: 150 MMHG | HEIGHT: 67 IN | TEMPERATURE: 96.9 F

## 2025-09-03 DIAGNOSIS — E66.01 MORBID (SEVERE) OBESITY DUE TO EXCESS CALORIES: ICD-10-CM

## 2025-09-03 DIAGNOSIS — E11.9 TYPE 2 DIABETES MELLITUS W/OUT COMPLICATIONS: ICD-10-CM

## 2025-09-03 DIAGNOSIS — G47.33 OBSTRUCTIVE SLEEP APNEA (ADULT) (PEDIATRIC): ICD-10-CM

## 2025-09-03 DIAGNOSIS — I10 ESSENTIAL (PRIMARY) HYPERTENSION: ICD-10-CM

## 2025-09-03 DIAGNOSIS — E04.1 NONTOXIC SINGLE THYROID NODULE: ICD-10-CM

## 2025-09-03 DIAGNOSIS — E78.5 HYPERLIPIDEMIA, UNSPECIFIED: ICD-10-CM

## 2025-09-03 PROCEDURE — 99214 OFFICE O/P EST MOD 30 MIN: CPT

## 2025-09-03 PROCEDURE — G0447 BEHAVIOR COUNSEL OBESITY 15M: CPT | Mod: 59

## 2025-09-08 LAB
ANION GAP SERPL CALC-SCNC: 13 MMOL/L
BUN SERPL-MCNC: 14 MG/DL
CALCIUM SERPL-MCNC: 9.3 MG/DL
CHLORIDE SERPL-SCNC: 105 MMOL/L
CO2 SERPL-SCNC: 26 MMOL/L
CREAT SERPL-MCNC: 0.5 MG/DL
EGFRCR SERPLBLD CKD-EPI 2021: 101 ML/MIN/1.73M2
GLUCOSE SERPL-MCNC: 94 MG/DL
POTASSIUM SERPL-SCNC: 3.9 MMOL/L
SODIUM SERPL-SCNC: 145 MMOL/L
T4 FREE SERPL-MCNC: 1.2 NG/DL
THYROGLOB AB SERPL-ACNC: 42.4 IU/ML
THYROPEROXIDASE AB SERPL IA-ACNC: 356 IU/ML
TSH SERPL-ACNC: 1.15 UIU/ML

## 2025-09-11 ENCOUNTER — APPOINTMENT (OUTPATIENT)
Dept: SURGERY | Facility: CLINIC | Age: 70
End: 2025-09-11

## 2025-09-16 ENCOUNTER — APPOINTMENT (OUTPATIENT)
Dept: SURGERY | Facility: CLINIC | Age: 70
End: 2025-09-16
Payer: MEDICARE

## 2025-09-16 VITALS
HEIGHT: 69 IN | DIASTOLIC BLOOD PRESSURE: 77 MMHG | BODY MASS INDEX: 39.69 KG/M2 | SYSTOLIC BLOOD PRESSURE: 126 MMHG | WEIGHT: 268 LBS | HEART RATE: 76 BPM

## 2025-09-16 DIAGNOSIS — E04.1 NONTOXIC SINGLE THYROID NODULE: ICD-10-CM

## 2025-09-16 PROCEDURE — 31575 DIAGNOSTIC LARYNGOSCOPY: CPT

## 2025-09-16 PROCEDURE — 99214 OFFICE O/P EST MOD 30 MIN: CPT | Mod: 25

## 2025-09-19 ENCOUNTER — APPOINTMENT (OUTPATIENT)
Dept: ORTHOPEDIC SURGERY | Facility: CLINIC | Age: 70
End: 2025-09-19
Payer: MEDICARE

## 2025-09-19 DIAGNOSIS — M17.0 BILATERAL PRIMARY OSTEOARTHRITIS OF KNEE: ICD-10-CM

## 2025-09-19 PROCEDURE — 20610 DRAIN/INJ JOINT/BURSA W/O US: CPT | Mod: 50

## 2025-09-19 PROCEDURE — 99213 OFFICE O/P EST LOW 20 MIN: CPT | Mod: 25

## (undated) DEVICE — DRAPE LAPAROTOMY TRANSVERSE

## (undated) DEVICE — DRSG MAMMARY SUPPORT LG SIZE 4

## (undated) DEVICE — DRSG TEGADERM 4X4.75"

## (undated) DEVICE — VENODYNE/SCD SLEEVE CALF MEDIUM

## (undated) DEVICE — SUT MONOCRYL 4-0 27" PS-2 UNDYED

## (undated) DEVICE — ELCTR BOVIE TIP BLADE INSULATED 2.75" EDGE

## (undated) DEVICE — DRSG MAMMARY SUPPORT SM SIZE 1

## (undated) DEVICE — PREP BETADINE KIT

## (undated) DEVICE — GLV 7.5 PROTEXIS (WHITE)

## (undated) DEVICE — DRSG MAMMARY SUPPORT MED SIZE 3

## (undated) DEVICE — DRAPE 3/4 SHEET 52X76"

## (undated) DEVICE — BASIN SET DOUBLE

## (undated) DEVICE — DRSG MAMMARY SUPPORT MED SIZE 2

## (undated) DEVICE — NDL HYPO SAFE 25G X 1" (ORANGE)

## (undated) DEVICE — DRAPE CHEST BREAST 106" X 122"

## (undated) DEVICE — POSITIONER STRAP ARMBOARD VELCRO TS-30

## (undated) DEVICE — GOWN LG

## (undated) DEVICE — ONETRAC LIGHTED RETRACTOR 90 X 22MM DISP

## (undated) DEVICE — SUT VICRYL 3-0 27" SH UNDYED

## (undated) DEVICE — DRSG MAMMARY SUPPORT XL SIZE 5

## (undated) DEVICE — SOL IRR POUR NS 0.9% 500ML

## (undated) DEVICE — LABELS BLANK W PEN

## (undated) DEVICE — SUCTION YANKAUER OPEN TIP NO VENT CURVE

## (undated) DEVICE — RADIOGRAPHY DVC SPEC TRANSPEC

## (undated) DEVICE — PACK MINOR CHEST BREAST

## (undated) DEVICE — SUT SILK 2-0 30" SH

## (undated) DEVICE — PACK MINOR NO DRAPE

## (undated) DEVICE — DRSG STERISTRIPS 0.5 X 4"

## (undated) DEVICE — ELCTR GROUNDING PAD ADULT COVIDIEN

## (undated) DEVICE — SYR LUER LOK 20CC

## (undated) DEVICE — LAP PAD W RING 18 X 18"

## (undated) DEVICE — VENODYNE/SCD SLEEVE CALF LARGE

## (undated) DEVICE — DRSG DERMABOND 0.7ML

## (undated) DEVICE — TUBING SUCTION NONCONDUCTIVE 6MM X 12FT

## (undated) DEVICE — DRAPE TOWEL BLUE 17" X 24"

## (undated) DEVICE — WARMING BLANKET LOWER ADULT